# Patient Record
Sex: FEMALE | Race: WHITE | Employment: OTHER | URBAN - NONMETROPOLITAN AREA
[De-identification: names, ages, dates, MRNs, and addresses within clinical notes are randomized per-mention and may not be internally consistent; named-entity substitution may affect disease eponyms.]

---

## 2017-01-10 PROBLEM — S06.0XAA CEREBRAL CONCUSSION: Status: ACTIVE | Noted: 2017-01-10

## 2017-01-10 PROBLEM — E11.9 DIABETES MELLITUS (HCC): Chronic | Status: ACTIVE | Noted: 2017-01-10

## 2017-06-27 ENCOUNTER — HOSPITAL ENCOUNTER (OUTPATIENT)
Dept: BONE DENSITY | Age: 72
Discharge: HOME | End: 2017-06-27
Payer: MEDICARE

## 2017-06-27 DIAGNOSIS — M81.0 AGE-RELATED OSTEOPOROSIS WITHOUT CURRENT PATHOLOGICAL FRACTURE: ICD-10-CM

## 2017-06-27 PROCEDURE — 77080 DXA BONE DENSITY AXIAL: CPT

## 2018-03-19 ENCOUNTER — HOSPITAL ENCOUNTER (OUTPATIENT)
Dept: WOMENS IMAGING | Age: 73
Discharge: HOME | End: 2018-03-21
Payer: MEDICARE

## 2018-03-19 DIAGNOSIS — Z12.39 SCREENING BREAST EXAMINATION: ICD-10-CM

## 2018-03-19 PROCEDURE — 77067 SCR MAMMO BI INCL CAD: CPT

## 2019-06-18 ENCOUNTER — OFFICE VISIT (OUTPATIENT)
Dept: OBGYN | Age: 74
End: 2019-06-18
Payer: MEDICARE

## 2019-06-18 ENCOUNTER — HOSPITAL ENCOUNTER (OUTPATIENT)
Age: 74
Setting detail: SPECIMEN
Discharge: HOME | End: 2019-06-18
Payer: MEDICARE

## 2019-06-18 VITALS
SYSTOLIC BLOOD PRESSURE: 136 MMHG | BODY MASS INDEX: 25.16 KG/M2 | WEIGHT: 142 LBS | HEIGHT: 63 IN | DIASTOLIC BLOOD PRESSURE: 82 MMHG

## 2019-06-18 DIAGNOSIS — Z12.4 SCREENING FOR CERVICAL CANCER: ICD-10-CM

## 2019-06-18 DIAGNOSIS — Z12.31 SCREENING MAMMOGRAM, ENCOUNTER FOR: ICD-10-CM

## 2019-06-18 DIAGNOSIS — N81.4 UTERINE PROLAPSE: ICD-10-CM

## 2019-06-18 DIAGNOSIS — N90.89 VULVAR IRRITATION: ICD-10-CM

## 2019-06-18 DIAGNOSIS — N90.89 VULVAR IRRITATION: Primary | ICD-10-CM

## 2019-06-18 PROCEDURE — 56605 BIOPSY OF VULVA/PERINEUM: CPT | Performed by: ADVANCED PRACTICE MIDWIFE

## 2019-06-18 PROCEDURE — G0145 SCR C/V CYTO,THINLAYER,RESCR: HCPCS

## 2019-06-18 PROCEDURE — 88312 SPECIAL STAINS GROUP 1: CPT

## 2019-06-18 PROCEDURE — 87070 CULTURE OTHR SPECIMN AEROBIC: CPT

## 2019-06-18 PROCEDURE — 88305 TISSUE EXAM BY PATHOLOGIST: CPT

## 2019-06-18 PROCEDURE — G0123 SCREEN CERV/VAG THIN LAYER: HCPCS

## 2019-06-18 RX ORDER — TRIAMCINOLONE ACETONIDE 5 MG/G
OINTMENT TOPICAL
Qty: 1 TUBE | Refills: 3 | Status: SHIPPED | OUTPATIENT
Start: 2019-06-18 | End: 2019-06-25

## 2019-06-18 SDOH — HEALTH STABILITY: MENTAL HEALTH: HOW OFTEN DO YOU HAVE A DRINK CONTAINING ALCOHOL?: NEVER

## 2019-06-18 ASSESSMENT — PATIENT HEALTH QUESTIONNAIRE - PHQ9
SUM OF ALL RESPONSES TO PHQ QUESTIONS 1-9: 0
SUM OF ALL RESPONSES TO PHQ QUESTIONS 1-9: 0
SUM OF ALL RESPONSES TO PHQ9 QUESTIONS 1 & 2: 0
2. FEELING DOWN, DEPRESSED OR HOPELESS: 0
1. LITTLE INTEREST OR PLEASURE IN DOING THINGS: 0

## 2019-06-18 NOTE — PROGRESS NOTES
PROBLEM VISIT     Date of service: 2019    Yuliana Briceño  Is a 72 y.o.  female    PT's PCP is: Darcy Gilbert MD     : 1945                                             Subjective:       No LMP recorded (lmp unknown). Patient is postmenopausal.   OB History    Para Term  AB Living   8       2 6   SAB TAB Ectopic Molar Multiple Live Births   2                # Outcome Date GA Lbr Delfino/2nd Weight Sex Delivery Anes PTL Lv   8             7             6             5             4             3             2 SAB            1 SAB                 Social History     Tobacco Use   Smoking Status Former Smoker   Smokeless Tobacco Never Used        Social History     Substance and Sexual Activity   Alcohol Use Not Currently    Frequency: Never       Allergies: Darvon [propoxyphene]      Current Outpatient Medications:     Fluticasone Furoate-Vilanterol (BREO ELLIPTA IN), Inhale into the lungs, Disp: , Rfl:     triamcinolone (ARISTOCORT) 0.5 % ointment, Apply topically 2 times daily for 4 weeks and then prn, Disp: 1 Tube, Rfl: 3    LOSARTAN POTASSIUM-HCTZ PO, Take by mouth, Disp: , Rfl:     Metoprolol Succinate (TOPROL XL PO), Take by mouth, Disp: , Rfl:     SIMVASTATIN PO, Take by mouth, Disp: , Rfl:     METFORMIN HCL PO, Take by mouth, Disp: , Rfl:     ASPIRIN PO, Take 81 mg by mouth daily , Disp: , Rfl:     Social History     Substance and Sexual Activity   Sexual Activity Not Currently       Last Yearly:  ? Last pap: ? Last HPV: ?    Chief Complaint   Patient presents with    Vaginal Discharge     C/O vaginal , itch and swelling. Patient saw Dr. Rigo Roberts last week and was treated with 3 day vaginal cream however has had no improvement,         PE:  Vital Signs  Blood pressure 136/82, height 5' 3\" (1.6 m), weight 142 lb (64.4 kg), not currently breastfeeding.        NURSE: Jose HURT    HPI: here for vulvar irritation W CAD BILATERAL         will treat likely  Lichen sclerosus; also patient would like to consider surgical options for uterine prolapse after this issue gets under control, doesn't like idea of pessary    I have discontinued Lul Tse's albuterol sulfate HFA. I am also having her start on triamcinolone. Additionally, I am having her maintain her LOSARTAN POTASSIUM-HCTZ PO, Metoprolol Succinate (TOPROL XL PO), SIMVASTATIN PO, METFORMIN HCL PO, ASPIRIN PO, and Fluticasone Furoate-Vilanterol (BREO ELLIPTA IN). Return for will call with results. There are no Patient Instructions on file for this visit. Over 50% of time spent on counseling and care coordination on: see assessment and plan,  She was also counseled on her preventative health maintenance recommendations and follow-up.         FF time: 15 min      Stephany Elena,6/18/2019 7:53 PM

## 2019-06-21 LAB — SURGICAL PATHOLOGY REPORT: NORMAL

## 2019-06-22 LAB
CULTURE: NORMAL
Lab: NORMAL
SPECIMEN DESCRIPTION: NORMAL

## 2019-06-26 LAB — CYTOLOGY REPORT: NORMAL

## 2019-07-22 ENCOUNTER — HOSPITAL ENCOUNTER (OUTPATIENT)
Dept: WOMENS IMAGING | Age: 74
Discharge: HOME | End: 2019-07-24
Payer: MEDICARE

## 2019-07-22 DIAGNOSIS — Z12.31 SCREENING MAMMOGRAM, ENCOUNTER FOR: ICD-10-CM

## 2019-07-22 PROCEDURE — 77063 BREAST TOMOSYNTHESIS BI: CPT

## 2019-08-01 ENCOUNTER — OFFICE VISIT (OUTPATIENT)
Dept: OBGYN | Age: 74
End: 2019-08-01
Payer: MEDICARE

## 2019-08-01 VITALS
HEIGHT: 63 IN | BODY MASS INDEX: 24.95 KG/M2 | WEIGHT: 140.8 LBS | DIASTOLIC BLOOD PRESSURE: 70 MMHG | SYSTOLIC BLOOD PRESSURE: 122 MMHG

## 2019-08-01 DIAGNOSIS — N81.4 UTERINE PROLAPSE: ICD-10-CM

## 2019-08-01 DIAGNOSIS — L43.9 LICHEN PLANUS: Primary | ICD-10-CM

## 2019-08-01 PROCEDURE — 99212 OFFICE O/P EST SF 10 MIN: CPT | Performed by: ADVANCED PRACTICE MIDWIFE

## 2019-08-01 PROCEDURE — 1036F TOBACCO NON-USER: CPT | Performed by: ADVANCED PRACTICE MIDWIFE

## 2019-08-01 PROCEDURE — G8420 CALC BMI NORM PARAMETERS: HCPCS | Performed by: ADVANCED PRACTICE MIDWIFE

## 2019-08-01 PROCEDURE — 3017F COLORECTAL CA SCREEN DOC REV: CPT | Performed by: ADVANCED PRACTICE MIDWIFE

## 2019-08-01 PROCEDURE — 1123F ACP DISCUSS/DSCN MKR DOCD: CPT | Performed by: ADVANCED PRACTICE MIDWIFE

## 2019-08-01 PROCEDURE — 4040F PNEUMOC VAC/ADMIN/RCVD: CPT | Performed by: ADVANCED PRACTICE MIDWIFE

## 2019-08-01 PROCEDURE — G8427 DOCREV CUR MEDS BY ELIG CLIN: HCPCS | Performed by: ADVANCED PRACTICE MIDWIFE

## 2019-08-01 PROCEDURE — 1090F PRES/ABSN URINE INCON ASSESS: CPT | Performed by: ADVANCED PRACTICE MIDWIFE

## 2019-08-01 PROCEDURE — G8399 PT W/DXA RESULTS DOCUMENT: HCPCS | Performed by: ADVANCED PRACTICE MIDWIFE

## 2019-08-01 NOTE — PROGRESS NOTES
Genitalia:  General appearance; normal, Hair distribution; normal, Lesions absent, no current open or red areas  Urethral Meatus:  Size normal, Location normal, Lesions absent, Prolapse absent  Urethra: Fullness absent, Masses absent  Bladder:  Fullness absent, Masses absent, Tenderness absent, Cystocele absent  Vagina:  General appearance normal, Estrogen effect normal, Discharge absent, Lesions absent, uterine prolapse with valsalva  Cervix:  General appearance normal, Lesions absent, Discharge absent, Tenderness absent, Enlargement absent, Nodularity absent  Uterus:  Size normal, Tenderness absent  Adenexa: Masses absent, Tenderness absent  Anus/Perineum:  Lesions absent and Masses absent                                                        Results reviewed today:    No results found for this visit on 08/01/19. Assessment and Plan          Diagnosis Orders   1. Lichen planus     2. Uterine prolapse         continue prn steroid topical; declines anything currently for uterine prolapse      I am having Anjana  maintain her LOSARTAN POTASSIUM-HCTZ PO, Metoprolol Succinate (TOPROL XL PO), SIMVASTATIN PO, METFORMIN HCL PO, ASPIRIN PO, and Fluticasone Furoate-Vilanterol (BREO ELLIPTA IN). Return in about 1 year (around 8/1/2020) for yearly or prn. There are no Patient Instructions on file for this visit. Over 50% of time spent on counseling and care coordination on: see assessment and plan,  She was also counseled on her preventative health maintenance recommendations and follow-up.         FF time: 15 min      Vipul Elena,8/1/2019 2:12 PM

## 2020-01-27 ENCOUNTER — HOSPITAL ENCOUNTER (OUTPATIENT)
Dept: WOMENS IMAGING | Age: 75
Discharge: HOME | End: 2020-01-29
Payer: MEDICARE

## 2020-01-27 PROCEDURE — 77080 DXA BONE DENSITY AXIAL: CPT

## 2021-05-18 ENCOUNTER — HOSPITAL ENCOUNTER (OUTPATIENT)
Age: 76
Discharge: HOME | End: 2021-05-18
Payer: MEDICARE

## 2021-05-18 LAB
ANION GAP SERPL CALCULATED.3IONS-SCNC: 9 MMOL/L (ref 9–17)
BUN BLDV-MCNC: 18 MG/DL (ref 8–23)
BUN/CREAT BLD: 19 (ref 9–20)
CALCIUM SERPL-MCNC: 10.6 MG/DL (ref 8.6–10.4)
CHLORIDE BLD-SCNC: 104 MMOL/L (ref 98–107)
CHOLESTEROL/HDL RATIO: 3.6
CHOLESTEROL: 160 MG/DL
CO2: 27 MMOL/L (ref 20–31)
CREAT SERPL-MCNC: 0.93 MG/DL (ref 0.5–0.9)
ESTIMATED AVERAGE GLUCOSE: 128 MG/DL
GFR AFRICAN AMERICAN: >60 ML/MIN
GFR NON-AFRICAN AMERICAN: 59 ML/MIN
GFR SERPL CREATININE-BSD FRML MDRD: ABNORMAL ML/MIN/{1.73_M2}
GFR SERPL CREATININE-BSD FRML MDRD: ABNORMAL ML/MIN/{1.73_M2}
GLUCOSE BLD-MCNC: 111 MG/DL (ref 70–99)
HBA1C MFR BLD: 6.1 % (ref 4–6)
HDLC SERPL-MCNC: 45 MG/DL
LDL CHOLESTEROL: 94 MG/DL (ref 0–130)
POTASSIUM SERPL-SCNC: 4.1 MMOL/L (ref 3.7–5.3)
SODIUM BLD-SCNC: 140 MMOL/L (ref 135–144)
TRIGL SERPL-MCNC: 106 MG/DL
VLDLC SERPL CALC-MCNC: NORMAL MG/DL (ref 1–30)

## 2021-05-18 PROCEDURE — 83036 HEMOGLOBIN GLYCOSYLATED A1C: CPT

## 2021-05-18 PROCEDURE — 87522 HEPATITIS C REVRS TRNSCRPJ: CPT

## 2021-05-18 PROCEDURE — 36415 COLL VENOUS BLD VENIPUNCTURE: CPT

## 2021-05-18 PROCEDURE — 80061 LIPID PANEL: CPT

## 2021-05-18 PROCEDURE — 80048 BASIC METABOLIC PNL TOTAL CA: CPT

## 2021-05-20 LAB
DIRECT EXAM: NORMAL
Lab: NORMAL
SPECIMEN DESCRIPTION: NORMAL

## 2021-05-28 ENCOUNTER — HOSPITAL ENCOUNTER (OUTPATIENT)
Age: 76
Discharge: HOME | End: 2021-05-28
Payer: MEDICARE

## 2021-05-28 ENCOUNTER — HOSPITAL ENCOUNTER (OUTPATIENT)
Age: 76
Discharge: HOME | End: 2021-05-30
Payer: MEDICARE

## 2021-05-28 ENCOUNTER — HOSPITAL ENCOUNTER (OUTPATIENT)
Dept: GENERAL RADIOLOGY | Age: 76
Discharge: HOME | End: 2021-05-30
Payer: MEDICARE

## 2021-05-28 DIAGNOSIS — R53.83 FATIGUE, UNSPECIFIED TYPE: ICD-10-CM

## 2021-05-28 LAB
-: ABNORMAL
AMORPHOUS: ABNORMAL
ANION GAP SERPL CALCULATED.3IONS-SCNC: 11 MMOL/L (ref 9–17)
BACTERIA: ABNORMAL
BILIRUBIN URINE: NEGATIVE
BUN BLDV-MCNC: 19 MG/DL (ref 8–23)
CASTS UA: ABNORMAL /LPF
CHLORIDE BLD-SCNC: 102 MMOL/L (ref 98–107)
CO2: 27 MMOL/L (ref 20–31)
COLOR: YELLOW
COMMENT UA: ABNORMAL
CREAT SERPL-MCNC: 0.93 MG/DL (ref 0.5–0.9)
CRYSTALS, UA: ABNORMAL /HPF
EKG ATRIAL RATE: 64 BPM
EKG P AXIS: 48 DEGREES
EKG P-R INTERVAL: 118 MS
EKG Q-T INTERVAL: 396 MS
EKG QRS DURATION: 92 MS
EKG QTC CALCULATION (BAZETT): 408 MS
EKG R AXIS: -34 DEGREES
EKG T AXIS: 44 DEGREES
EKG VENTRICULAR RATE: 64 BPM
EPITHELIAL CELLS UA: ABNORMAL /HPF (ref 0–25)
GFR AFRICAN AMERICAN: >60 ML/MIN
GFR NON-AFRICAN AMERICAN: 59 ML/MIN
GFR SERPL CREATININE-BSD FRML MDRD: ABNORMAL ML/MIN/{1.73_M2}
GFR SERPL CREATININE-BSD FRML MDRD: ABNORMAL ML/MIN/{1.73_M2}
GLUCOSE URINE: NEGATIVE
KETONES, URINE: NEGATIVE
LEUKOCYTE ESTERASE, URINE: ABNORMAL
MAGNESIUM: 1.9 MG/DL (ref 1.6–2.6)
MUCUS: ABNORMAL
NITRITE, URINE: NEGATIVE
OTHER OBSERVATIONS UA: ABNORMAL
PH UA: 5.5 (ref 5–9)
POTASSIUM SERPL-SCNC: 4.2 MMOL/L (ref 3.7–5.3)
PROTEIN UA: ABNORMAL
RBC UA: ABNORMAL /HPF (ref 0–2)
RENAL EPITHELIAL, UA: ABNORMAL /HPF
SODIUM BLD-SCNC: 140 MMOL/L (ref 135–144)
SPECIFIC GRAVITY UA: 1.02 (ref 1.01–1.02)
THYROXINE, FREE: 0.96 NG/DL (ref 0.93–1.7)
TOTAL CK: 59 U/L (ref 26–192)
TRICHOMONAS: ABNORMAL
TSH SERPL DL<=0.05 MIU/L-ACNC: 0.32 MIU/L (ref 0.3–5)
TURBIDITY: ABNORMAL
URINE HGB: ABNORMAL
UROBILINOGEN, URINE: NORMAL
WBC UA: ABNORMAL /HPF (ref 0–5)
YEAST: ABNORMAL

## 2021-05-28 PROCEDURE — 71046 X-RAY EXAM CHEST 2 VIEWS: CPT

## 2021-05-28 PROCEDURE — 80051 ELECTROLYTE PANEL: CPT

## 2021-05-28 PROCEDURE — 87086 URINE CULTURE/COLONY COUNT: CPT

## 2021-05-28 PROCEDURE — 36415 COLL VENOUS BLD VENIPUNCTURE: CPT

## 2021-05-28 PROCEDURE — 84443 ASSAY THYROID STIM HORMONE: CPT

## 2021-05-28 PROCEDURE — 84439 ASSAY OF FREE THYROXINE: CPT

## 2021-05-28 PROCEDURE — 82565 ASSAY OF CREATININE: CPT

## 2021-05-28 PROCEDURE — 93005 ELECTROCARDIOGRAM TRACING: CPT

## 2021-05-28 PROCEDURE — 81001 URINALYSIS AUTO W/SCOPE: CPT

## 2021-05-28 PROCEDURE — 82550 ASSAY OF CK (CPK): CPT

## 2021-05-28 PROCEDURE — 84520 ASSAY OF UREA NITROGEN: CPT

## 2021-05-28 PROCEDURE — 83735 ASSAY OF MAGNESIUM: CPT

## 2021-05-29 LAB
CULTURE: NO GROWTH
Lab: NORMAL
SPECIMEN DESCRIPTION: NORMAL

## 2021-06-09 ENCOUNTER — HOSPITAL ENCOUNTER (OUTPATIENT)
Dept: NON INVASIVE DIAGNOSTICS | Age: 76
Discharge: HOME | End: 2021-06-09
Payer: MEDICARE

## 2021-06-09 DIAGNOSIS — R53.83 FATIGUE, UNSPECIFIED TYPE: ICD-10-CM

## 2021-06-09 DIAGNOSIS — R00.2 PALPITATIONS: ICD-10-CM

## 2021-06-09 DIAGNOSIS — I51.89 DIASTOLIC DYSFUNCTION: ICD-10-CM

## 2021-06-09 LAB
LV EF: 65 %
LVEF MODALITY: NORMAL

## 2021-06-09 PROCEDURE — 93306 TTE W/DOPPLER COMPLETE: CPT

## 2021-06-09 PROCEDURE — 93225 XTRNL ECG REC<48 HRS REC: CPT

## 2021-06-09 PROCEDURE — 93226 XTRNL ECG REC<48 HR SCAN A/R: CPT

## 2021-06-09 NOTE — PROGRESS NOTES
Explained policies and procedures of an echocardiogram/doppler study. Explained Holter monitor and diary.

## 2021-06-14 LAB
ACQUISITION DURATION: NORMAL S
AVERAGE HEART RATE: 63 BPM
EKG DIAGNOSIS: NORMAL
HOLTER MAX HEART RATE: 87 BPM
HOOKUP DATE: NORMAL
HOOKUP TIME: NORMAL
MAX HEART RATE TIME/DATE: NORMAL
MIN HEART RATE TIME/DATE: NORMAL
MIN HEART RATE: 48 BPM
NUMBER OF QRS COMPLEXES: NORMAL
NUMBER OF SUPRAVENTRICULAR COUPLETS: 5
NUMBER OF SUPRAVENTRICULAR ECTOPICS: 3616
NUMBER OF SUPRAVENTRICULAR ISOLATED BEATS: 2851
NUMBER OF VENTRICULAR BIGEMINAL CYCLES: 0
NUMBER OF VENTRICULAR COUPLETS: 0
NUMBER OF VENTRICULAR ECTOPICS: 38

## 2021-10-08 ENCOUNTER — OFFICE VISIT (OUTPATIENT)
Dept: CARDIOLOGY | Age: 76
End: 2021-10-08
Payer: MEDICARE

## 2021-10-08 VITALS
HEART RATE: 70 BPM | RESPIRATION RATE: 17 BRPM | BODY MASS INDEX: 23.49 KG/M2 | DIASTOLIC BLOOD PRESSURE: 67 MMHG | SYSTOLIC BLOOD PRESSURE: 133 MMHG | WEIGHT: 137.6 LBS | HEIGHT: 64 IN | OXYGEN SATURATION: 98 %

## 2021-10-08 DIAGNOSIS — R06.02 SOB (SHORTNESS OF BREATH): ICD-10-CM

## 2021-10-08 DIAGNOSIS — J44.9 CHRONIC OBSTRUCTIVE PULMONARY DISEASE, UNSPECIFIED COPD TYPE (HCC): ICD-10-CM

## 2021-10-08 DIAGNOSIS — R94.31 ABNORMAL ECG: ICD-10-CM

## 2021-10-08 DIAGNOSIS — I34.0 MODERATE MITRAL REGURGITATION: Primary | ICD-10-CM

## 2021-10-08 DIAGNOSIS — R00.2 PALPITATIONS: ICD-10-CM

## 2021-10-08 PROCEDURE — G8484 FLU IMMUNIZE NO ADMIN: HCPCS | Performed by: INTERNAL MEDICINE

## 2021-10-08 PROCEDURE — G8926 SPIRO NO PERF OR DOC: HCPCS | Performed by: INTERNAL MEDICINE

## 2021-10-08 PROCEDURE — 93005 ELECTROCARDIOGRAM TRACING: CPT | Performed by: INTERNAL MEDICINE

## 2021-10-08 PROCEDURE — 99211 OFF/OP EST MAY X REQ PHY/QHP: CPT | Performed by: INTERNAL MEDICINE

## 2021-10-08 PROCEDURE — 93000 ELECTROCARDIOGRAM COMPLETE: CPT | Performed by: INTERNAL MEDICINE

## 2021-10-08 PROCEDURE — G8420 CALC BMI NORM PARAMETERS: HCPCS | Performed by: INTERNAL MEDICINE

## 2021-10-08 PROCEDURE — 3023F SPIROM DOC REV: CPT | Performed by: INTERNAL MEDICINE

## 2021-10-08 PROCEDURE — G8427 DOCREV CUR MEDS BY ELIG CLIN: HCPCS | Performed by: INTERNAL MEDICINE

## 2021-10-08 PROCEDURE — 1090F PRES/ABSN URINE INCON ASSESS: CPT | Performed by: INTERNAL MEDICINE

## 2021-10-08 PROCEDURE — 99204 OFFICE O/P NEW MOD 45 MIN: CPT | Performed by: INTERNAL MEDICINE

## 2021-10-08 RX ORDER — ATORVASTATIN CALCIUM 40 MG/1
40 TABLET, FILM COATED ORAL DAILY
COMMUNITY

## 2021-10-08 RX ORDER — LOSARTAN POTASSIUM 25 MG/1
25 TABLET ORAL DAILY
COMMUNITY

## 2021-10-08 RX ORDER — SERTRALINE HYDROCHLORIDE 100 MG/1
100 TABLET, FILM COATED ORAL DAILY
COMMUNITY

## 2021-10-08 NOTE — PROGRESS NOTES
Lynnette Kaplan MA am scribing for and in the presence of Laquita Sanchez MD.    Patient: Nilda Tidwell  : 1945  Date of Visit: 2021    REASON FOR VISIT / CONSULTATION: Establish Cardiologist (Referral from [address] w/ palpitations. Echo & Holter on 21. EKG on 21. Was very tired & palpitations. Had a kidney stone at that time. Still has the palpitations, it is sporatic. Some SOB at times due to COPD. Lightheaded/dizziness that happens randomly. Denies: CP. )      History of Present Illness:        Dear LUDWIG Michael CNP    I had the pleasure of seeing Nilda Tidwell today. Ms. Frances Greenwood is a 78 y.o. female who presented for evaluation and to establish care. She has long history of type 2 diabetes on Metformin, dyslipidemia on Lipitor and hypertension. She also has intermittent palpitations. She had a kidney stone back in 2021 and an echo done at that time showed moderate mitral regurgitation. She also said she was diagnosed with COPD by Dr. Jeovanny Damon. She is a former smoker who smoked 1/2 ppd for 30 years. She quit in . With regard to her family history. She said there is no heart disease in the family except for her son who is 55-year-old when he got heart attack post Covid. Other than this, no immediate family members with premature CAD. Risk Factors:  Age  Hypertension  Hyperlipidemia  Diabetes  Former Smoker    EKG done today in office on 10/8/2021: Sinus rhythm with left axis deviation and moderate voltage criteria for LVH. Ms. Frances Greenwood is here today to establish care after being referred by LUDWIG Michael CNP. She is complaining of chronic shortness of breath on exertion which she is always attributing it to her COPD. She also continues to complain of intermittent palpitations. No significant dizziness or lightheadedness. No presyncope or syncopal episodes. No chest pain, pressure or tightness. No fever or cough.   No orthopnea or PND but reported having trouble sleeping at night. She said it hit or miss. She considers herself fairly active, babysitting her grandkids and taking care of her household chores and grocery shopping without significant exertional symptoms. She denied any current or recent chest pain, pressure or tightness. She denies having any abdominal pain, nausea or vomiting. No cough, fever or chills. No bleeding problems, problems with her medications or any other concerns at this time. Exercise Tolerance: Ms. Ness Gary reports that she has a fairly good exercise tolerance. Her says that she could walk 1 mile without developing chest discomfort or significant shortness of breath. She has 4 great grand kids under the age of 3 that she helps care for. She doesn't regularly exercise. PAST MEDICAL HISTORY:        Past Medical History:   Diagnosis Date    [facility]ch[facility] obstructive [facility] disease) [facility]     [facility]     Hyperlipidemia     Hypertension        CURRENT ALLERGIES: Darvon [propoxyphene] REVIEW OF SYSTEMS: 14 systems were reviewed. Pertinent positives and negatives as above, all else negative.      Past Surgical History:   Procedure Laterality Date    COLONOSCOPY  3/21/16    -hemorrhoids    DENTAL SURGERY      JOINT REPLACEMENT      right total knee    Social History:  Social History     Tobacco Use    Smoking status: Former Smoker     Packs/day: 0.50     Years: 30.00     Pack years: 15.00     Quit date: 2015     Years since quittin.7    Smokeless tobacco: Never Used   Substance Use Topics    Alcohol use: Not Currently    Drug use: Never        CURRENT MEDICATIONS:        Outpatient Medications Marked as Taking for the 10/8/21 encounter (Office Visit) with Colin Childress MD   Medication Sig Dispense Refill    atorvastatin (LIPITOR) 40 MG tablet Take 40 mg by mouth daily      losartan (COZAAR) 25 MG tablet Take 25 mg by mouth daily      sertraline (ZOLOFT) 100 MG tablet Take 100 mg by mouth daily      Fluticasone Furoate-Vilanterol (BREO ELLIPTA IN) Inhale into the lungs as needed       metoprolol succinate (TOPROL XL) 25 MG extended release tablet Take 25 mg by mouth daily       metFORMIN (GLUCOPHAGE) 500 MG tablet Take 500 mg by mouth daily       ASPIRIN PO Take 81 mg by mouth daily          FAMILY HISTORY: family history includes Cervical Cancer in her daughter; Other in an other family member. Physical Examination:     /67 (Site: Left Upper Arm, Position: Sitting, Cuff Size: Medium Adult)   Pulse 70   Resp 17   Ht 5' 3.5\" (1.613 m)   Wt 137 lb 9.6 oz (62.4 kg)   LMP  (LMP Unknown)   SpO2 98%   BMI 23.99 kg/m²  Body mass index is 23.99 kg/m². Constitutional: She is oriented to person, place, and time. She appears well-developed and well-nourished. In no acute distress. HEENT: Normocephalic and atraumatic. No JVD present. Carotid bruit is not present. No mass and no thyromegaly present. No lymphadenopathy present. Cardiovascular: Normal rate, regular rhythm, normal heart sounds. Exam reveals no gallop and no friction rubs. 2/6 systolic murmur, 5th intercostal space on the LEFT in the mid-clavicular line (cardiac apex). Pulmonary/Chest: Poor air entry bilaterally. No significant wheezes or crackles. Abdominal: Soft, non-tender. Bowel sounds and aorta are normal. She exhibits no organomegaly, mass or bruit. Extremities: No significant edema. No cyanosis or clubbing. 2+ radial and carotid pulses. Distal extremity pulses: 2+ bilaterally. Neurological: She is alert and oriented to person, place, and time. No evidence of gross cranial nerve deficit. Coordination appeared normal.   Skin: Skin is warm and dry. There is no rash or diaphoresis. Psychiatric: She has a normal mood and affect.  Her speech is normal and behavior is normal.        MOST RECENT LABS ON RECORD:   Lab Results   Component Value Date    WBC 8.7 01/10/2017    HGB 11.3 (L) 01/10/2017    HCT 33.4 (L) 01/10/2017     01/10/2017    CHOL 160 05/18/2021    TRIG 106 05/18/2021    HDL 45 05/18/2021    ALT 28 09/23/2014    AST 22 09/23/2014     05/28/2021    K 4.2 05/28/2021     05/28/2021    CREATININE 0.93 (H) 05/28/2021    BUN 19 05/28/2021    CO2 27 05/28/2021    TSH 0.32 05/28/2021    LABA1C 6.1 (H) 05/18/2021       ASSESSMENT:     1. Moderate mitral regurgitation    2. Palpitations    3. SOB (shortness of breath)    4. Chronic obstructive pulmonary disease, unspecified COPD type ([facility]       PLAN:        · Moderate Mitral Regurgitation:  · Complaining of chronic shortness of breath but she attributes this to her COPD. · No clinical signs of volume overload. · I reviewed her echo, I told her we just need to get a repeat echo after 1 year to reevaluate her mitral regurgitation. · We discussed the natural history of mitral valve regurgitation in the new modalities of transvascular mitral intervention if needed. · Continue losartan and Toprol-XL.  Paroxysmal Recurrent intermittent palpitations: Rate Control Symptomatic  · Beta Blocker: Continue Metoprolol succinate (Toprol XL) 25 mg daily. · I reviewed her prior Holter monitor from June 2021: Sinus rhythm with average heart rate of 63 bpm ranging between 48 and 87 bpm.  Rare isolated PVCs. Occasional PACs with occasional couplets. PVCs burden 2%. One atrial run of 6 beats at heart rate 154 bpm.  · I will order a Cam monitor for longer monitoring time. The main reason for this is to assess the frequency of her atrial tachycardia and to assess her chronotropic incompetence. · Shortness of Breath:   · Abnormal ECG   Although her shortness of breath can be easily explained by her COPD, patient has multiple risk factors for CAD including age, hypertension, dyslipidemia and longstanding diabetes.  Her ECG is abnormal as above.   I think getting Lexiscan stress test is needed to rule out ischemic etiology of her shortness of breath that can be an anginal equivalent.  Continue aspirin, Toprol-XL and Lipitor for now. · Essential Hypertension: Controlled   Beta Blocker: Continue Metoprolol succinate (Toprol XL) 25 mg daily.  ACE Inibitor/ARB: Continue losartan (Cozaar) 25 mg daily. · Hyperlipidemia: Mixed - Last LDL on 5/18/2021 was 94 mg/dL   · Cholesterol Reduction Therapy: Continue Atorvastatin (Lipitor) 40 mg daily. I discussed the potential benefits of statin therapy as well as the potential risks including myalgia as well as the rare but potentially serious complication of liver or kidney damage. Although rare, I told them that this could be serious and therefore told them to stop the medication immediately and call if they developed any severe muscle aches or pains and they agreed to do so. · Will consider increasing her Lipitor to 80 mg or switching her to Crestor particularly if her stress test came back abnormal.  Patient is reluctant to change her statin therapy at this point because she does not think she will tolerate high intensity statin therapy. I did explain to her that diabetes is a coronary artery disease equivalent and we should get her LDL below 70 mg/dL. She states she will try lifestyle changes before increasing her medications. · Diabetes: Type 2   · Continue Metformin  · Hemoglobin A1c 6.1%. In the meantime, I encouraged Ms. Carlitso Juárez to continue to take her other medications. FOLLOW UP:   I told Ms. Carlitos Juárez to call my office if she had any problems, but otherwise I asked her to Return in about 1 year (around 10/8/2022). However, I would be happy to see her sooner should the need arise. Sincerely,  Ila Wilson MD, F.A.C.C.   [facility] Cardiology Specialist    [address] [facility]  Phone: [phone], Fax: [phone]     I believe that the risk of significant morbidity and mortality related to the patient's current medical conditions are: intermediate-high. >60 minutes were spent during prep work, discussion and exam of the patient, and follow up documentation and all of their questions were answered. The documentation recorded by the scribe, accurately and completely reflects the services I personally performed and the decisions made by me. Trang Schneider MD, F.A.C.C.  October 8, 2021

## 2022-01-04 ENCOUNTER — HOSPITAL ENCOUNTER (OUTPATIENT)
Dept: INFUSION THERAPY | Age: 77
Discharge: HOME | End: 2022-01-04
Payer: MEDICARE

## 2022-01-04 VITALS
OXYGEN SATURATION: 98 % | RESPIRATION RATE: 18 BRPM | DIASTOLIC BLOOD PRESSURE: 63 MMHG | HEART RATE: 57 BPM | SYSTOLIC BLOOD PRESSURE: 120 MMHG | TEMPERATURE: 97.9 F

## 2022-01-04 DIAGNOSIS — U07.1 COVID-19: Primary | ICD-10-CM

## 2022-01-04 DIAGNOSIS — U07.1 COVID-19: ICD-10-CM

## 2022-01-04 PROCEDURE — M0245 HC IV INFUSION BAMLANIVIMAB & ETESEVIMAB W/MONITORING: HCPCS

## 2022-01-04 PROCEDURE — 2580000003 HC RX 258: Performed by: NURSE PRACTITIONER

## 2022-01-04 PROCEDURE — 6360000002 HC RX W HCPCS: Performed by: NURSE PRACTITIONER

## 2022-01-04 PROCEDURE — 2500000003 HC RX 250 WO HCPCS: Performed by: NURSE PRACTITIONER

## 2022-01-04 RX ORDER — SODIUM CHLORIDE 9 MG/ML
INJECTION, SOLUTION INTRAVENOUS CONTINUOUS
Status: CANCELLED | OUTPATIENT
Start: 2022-01-04

## 2022-01-04 RX ORDER — ACETAMINOPHEN 325 MG/1
650 TABLET ORAL
OUTPATIENT
Start: 2022-01-04

## 2022-01-04 RX ORDER — DEXAMETHASONE 6 MG/1
6 TABLET ORAL
COMMUNITY
Start: 2022-01-03

## 2022-01-04 RX ORDER — SODIUM CHLORIDE 0.9 % (FLUSH) 0.9 %
5-40 SYRINGE (ML) INJECTION PRN
Status: CANCELLED | OUTPATIENT
Start: 2022-01-04

## 2022-01-04 RX ORDER — DOXYCYCLINE HYCLATE 100 MG/1
100 CAPSULE ORAL 2 TIMES DAILY
COMMUNITY
Start: 2022-01-03 | End: 2022-01-10

## 2022-01-04 RX ORDER — EPINEPHRINE 1 MG/ML
0.3 INJECTION, SOLUTION, CONCENTRATE INTRAVENOUS PRN
OUTPATIENT
Start: 2022-01-04

## 2022-01-04 RX ORDER — SODIUM CHLORIDE 9 MG/ML
25 INJECTION, SOLUTION INTRAVENOUS PRN
OUTPATIENT
Start: 2022-01-04

## 2022-01-04 RX ORDER — GUAIFENESIN 600 MG/1
1200 TABLET, EXTENDED RELEASE ORAL EVERY 12 HOURS SCHEDULED
COMMUNITY
Start: 2022-01-03 | End: 2022-02-02

## 2022-01-04 RX ORDER — DIPHENHYDRAMINE HYDROCHLORIDE 50 MG/ML
50 INJECTION INTRAMUSCULAR; INTRAVENOUS
OUTPATIENT
Start: 2022-01-04

## 2022-01-04 RX ORDER — ONDANSETRON 2 MG/ML
8 INJECTION INTRAMUSCULAR; INTRAVENOUS
OUTPATIENT
Start: 2022-01-04

## 2022-01-04 RX ORDER — SODIUM CHLORIDE 0.9 % (FLUSH) 0.9 %
5-40 SYRINGE (ML) INJECTION PRN
Status: DISCONTINUED | OUTPATIENT
Start: 2022-01-04 | End: 2022-01-05 | Stop reason: HOSPADM

## 2022-01-04 RX ORDER — SODIUM CHLORIDE 9 MG/ML
INJECTION, SOLUTION INTRAVENOUS CONTINUOUS
OUTPATIENT
Start: 2022-01-04

## 2022-01-04 RX ORDER — SODIUM CHLORIDE 9 MG/ML
INJECTION, SOLUTION INTRAVENOUS CONTINUOUS
Status: DISCONTINUED | OUTPATIENT
Start: 2022-01-04 | End: 2022-01-05 | Stop reason: HOSPADM

## 2022-01-04 RX ORDER — ALBUTEROL SULFATE 90 UG/1
4 AEROSOL, METERED RESPIRATORY (INHALATION) PRN
OUTPATIENT
Start: 2022-01-04

## 2022-01-04 RX ADMIN — SODIUM CHLORIDE, PRESERVATIVE FREE 10 ML: 5 INJECTION INTRAVENOUS at 14:35

## 2022-01-04 RX ADMIN — SODIUM CHLORIDE: 9 INJECTION, SOLUTION INTRAVENOUS at 14:41

## 2022-01-04 NOTE — PROGRESS NOTES
Patient presents for scheduled monoclonal antibody infusion. Confirmed meets criteria. VSS. Educated.  Infusion in progress and tolerating well.agrees to get the monoclonial antibody

## 2022-03-24 ENCOUNTER — OFFICE VISIT (OUTPATIENT)
Dept: OBGYN | Age: 77
End: 2022-03-24
Payer: MEDICARE

## 2022-03-24 VITALS
WEIGHT: 144 LBS | HEIGHT: 63 IN | DIASTOLIC BLOOD PRESSURE: 72 MMHG | SYSTOLIC BLOOD PRESSURE: 138 MMHG | BODY MASS INDEX: 25.52 KG/M2

## 2022-03-24 DIAGNOSIS — L43.9 LICHEN PLANUS: Primary | ICD-10-CM

## 2022-03-24 DIAGNOSIS — N81.4 CYSTOCELE WITH PROLAPSE: ICD-10-CM

## 2022-03-24 DIAGNOSIS — B37.2 CUTANEOUS CANDIDIASIS: ICD-10-CM

## 2022-03-24 DIAGNOSIS — K64.4 HEMORRHOIDAL SKIN TAGS: ICD-10-CM

## 2022-03-24 DIAGNOSIS — N81.4 UTERINE PROLAPSE: ICD-10-CM

## 2022-03-24 DIAGNOSIS — K62.9 PERIANAL LESION: ICD-10-CM

## 2022-03-24 DIAGNOSIS — Z12.31 SCREENING MAMMOGRAM, ENCOUNTER FOR: ICD-10-CM

## 2022-03-24 PROCEDURE — 99211 OFF/OP EST MAY X REQ PHY/QHP: CPT | Performed by: ADVANCED PRACTICE MIDWIFE

## 2022-03-24 PROCEDURE — G8427 DOCREV CUR MEDS BY ELIG CLIN: HCPCS | Performed by: ADVANCED PRACTICE MIDWIFE

## 2022-03-24 PROCEDURE — G8417 CALC BMI ABV UP PARAM F/U: HCPCS | Performed by: ADVANCED PRACTICE MIDWIFE

## 2022-03-24 PROCEDURE — G8399 PT W/DXA RESULTS DOCUMENT: HCPCS | Performed by: ADVANCED PRACTICE MIDWIFE

## 2022-03-24 PROCEDURE — 3017F COLORECTAL CA SCREEN DOC REV: CPT | Performed by: ADVANCED PRACTICE MIDWIFE

## 2022-03-24 PROCEDURE — 4040F PNEUMOC VAC/ADMIN/RCVD: CPT | Performed by: ADVANCED PRACTICE MIDWIFE

## 2022-03-24 PROCEDURE — 99213 OFFICE O/P EST LOW 20 MIN: CPT | Performed by: ADVANCED PRACTICE MIDWIFE

## 2022-03-24 PROCEDURE — G8484 FLU IMMUNIZE NO ADMIN: HCPCS | Performed by: ADVANCED PRACTICE MIDWIFE

## 2022-03-24 PROCEDURE — 1123F ACP DISCUSS/DSCN MKR DOCD: CPT | Performed by: ADVANCED PRACTICE MIDWIFE

## 2022-03-24 PROCEDURE — 1090F PRES/ABSN URINE INCON ASSESS: CPT | Performed by: ADVANCED PRACTICE MIDWIFE

## 2022-03-24 PROCEDURE — 1036F TOBACCO NON-USER: CPT | Performed by: ADVANCED PRACTICE MIDWIFE

## 2022-03-24 RX ORDER — CLOTRIMAZOLE AND BETAMETHASONE DIPROPIONATE 10; .64 MG/G; MG/G
CREAM TOPICAL
Qty: 45 G | Refills: 1 | Status: SHIPPED | OUTPATIENT
Start: 2022-03-24

## 2022-03-24 RX ORDER — CLOBETASOL PROPIONATE 0.5 MG/G
OINTMENT TOPICAL
Qty: 45 G | Refills: 1 | Status: SHIPPED | OUTPATIENT
Start: 2022-03-24

## 2022-03-24 NOTE — PROGRESS NOTES
PROBLEM VISIT     Date of service: 3/24/2022    Patricia Garay  Is a 78 y. o.  female    PT's PCP is: LUDWIG Iqbal CNP     : 1945                                             Subjective:       No LMP recorded (lmp unknown). Patient is postmenopausal.   OB History    Para Term  AB Living   8       2 6   SAB IAB Ectopic Molar Multiple Live Births   2                # Outcome Date GA Lbr Delfino/2nd Weight Sex Delivery Anes PTL Lv   8             7             6             5             4             3             2 SAB            1 SAB                 Social History     Tobacco Use   Smoking Status Former Smoker    Packs/day: 0.50    Years: 30.00    Pack years: 15.00    Quit date: 2015    Years since quittin.2   Smokeless Tobacco Never Used        Social History     Substance and Sexual Activity   Alcohol Use Not Currently       Allergies: Darvon [propoxyphene]      Current Outpatient Medications:     clobetasol (TEMOVATE) 0.05 % ointment, Apply topically 2 times daily. , Disp: 45 g, Rfl: 1    clotrimazole-betamethasone (LOTRISONE) 1-0.05 % cream, Apply topically 2 times daily. , Disp: 45 g, Rfl: 1    atorvastatin (LIPITOR) 40 MG tablet, Take 40 mg by mouth daily, Disp: , Rfl:     losartan (COZAAR) 25 MG tablet, Take 25 mg by mouth daily, Disp: , Rfl:     sertraline (ZOLOFT) 100 MG tablet, Take 100 mg by mouth daily, Disp: , Rfl:     Fluticasone Furoate-Vilanterol (BREO ELLIPTA IN), Inhale into the lungs as needed , Disp: , Rfl:     metoprolol succinate (TOPROL XL) 25 MG extended release tablet, Take 25 mg by mouth daily , Disp: , Rfl:     metFORMIN (GLUCOPHAGE) 500 MG tablet, Take 500 mg by mouth daily , Disp: , Rfl:     ASPIRIN PO, Take 81 mg by mouth daily , Disp: , Rfl:     dexamethasone (DECADRON) 6 MG tablet, Take 6 mg by mouth daily (with breakfast) (Patient not taking: Reported on 3/24/2022), Disp: , Rfl: Social History     Substance and Sexual Activity   Sexual Activity Not Currently       Last Yearly:  2019    Last pap: 2019    Last HPV: unknown    Last mammo 2019    Have you had a positive covid test: Yes    Have you had the covid immunization: Yes    Chief Complaint   Patient presents with    Vaginal Itching     C/O vaginal itch and irritation for 4-6 weeks. H/O lichen sclerosis. PE:  Vital Signs  Blood pressure 138/72, height 5' 3\" (1.6 m), weight 144 lb (65.3 kg), not currently breastfeeding. Estimated body mass index is 25.51 kg/m² as calculated from the following:    Height as of this encounter: 5' 3\" (1.6 m). Weight as of this encounter: 144 lb (65.3 kg). No data recorded    NURSE: García HURT    HPI: patient here for recurrent vulvar irritation , hx of lichen planus; also requests definitive tx of her pelvic floor prolapse issues, also desires \"old hemorrhoids get fixed\"     PT denies fever, chills, nausea and vomiting       Objective   No acute distress  Excellent communications  Well-nourished              Pelvic Exam: GENITAL/URINARY:  External Genitalia:  Hair distribution; normal, l. Minora edematous bilaterally with thin tissue some blanched appearing as lichen; bilateral groins through l. Majora appear as cutaneous candidiasis  Urethral Meatus:  Size normal, Location normal, Lesions absent, Prolapse absent  Urethra: Fullness absent, Masses absent  Bladder:  Fullness absent, Masses absent, Tenderness absent, cystocele gr 2  Vagina:  General appearance normal, Estrogen effect normal, Discharge absent, Lesions absent, cervix low in vaginal vault  Cervix:  General appearance normal, Lesions absent, Discharge absent, Tenderness absent, Enlargement absent, Nodularity absent  Uterus:  Size normal, Tenderness absent  Adenexa:   Masses absent, Tenderness absent  Anus/Perineum: multiple tag type projections but atypical possibly lichenoid characteristics Physical Exam  Genitourinary:                                 Results reviewed today:    No results found for this visit on 03/24/22. Assessment and Plan          Diagnosis Orders   1. Lichen planus  clobetasol (TEMOVATE) 0.05 % ointment   2. Uterine prolapse     3. Cystocele with prolapse     4. Perianal lesion  [facility] [place], General Surgery, [place]   5. Hemorrhoidal skin tags  [place], [place], General Surgery, [place]   6. Screening mammogram, encounter for  [facility] FRACISCO DIGITAL SCREEN BILATERAL   7. Cutaneous candidiasis  clotrimazole-betamethasone (LOTRISONE) 1-0.05 % cream             I am having Kaitlin Krishna start on clobetasol and clotrimazole-betamethasone. I am also having her maintain her metoprolol succinate, metFORMIN, ASPIRIN PO, Fluticasone Furoate-Vilanterol (BREO ELLIPTA IN), atorvastatin, losartan, sertraline, and dexamethasone. Return for surgical discussion Dr. Terry Zepeda uterine prolapse and cystocele. There are no Patient Instructions on file for this visit. Over 50% of time spent on counseling and care coordination on: see assessment and plan,  She was also counseled on her preventative health maintenance recommendations and follow-up.         FF time: 20 min      LUDWIG Osei CNM,3/24/2022 10:17 AM

## 2022-04-26 ENCOUNTER — OFFICE VISIT (OUTPATIENT)
Dept: OBGYN | Age: 77
End: 2022-04-26
Payer: MEDICARE

## 2022-04-26 VITALS
SYSTOLIC BLOOD PRESSURE: 124 MMHG | HEIGHT: 63 IN | BODY MASS INDEX: 25.34 KG/M2 | WEIGHT: 143 LBS | DIASTOLIC BLOOD PRESSURE: 86 MMHG

## 2022-04-26 DIAGNOSIS — N81.4 UTERINE PROLAPSE: Primary | ICD-10-CM

## 2022-04-26 DIAGNOSIS — N81.11 CYSTOCELE, MIDLINE: ICD-10-CM

## 2022-04-26 PROCEDURE — 57160 INSERT PESSARY/OTHER DEVICE: CPT | Performed by: OBSTETRICS & GYNECOLOGY

## 2022-04-26 ASSESSMENT — PATIENT HEALTH QUESTIONNAIRE - PHQ9
SUM OF ALL RESPONSES TO PHQ QUESTIONS 1-9: 0
SUM OF ALL RESPONSES TO PHQ QUESTIONS 1-9: 0
SUM OF ALL RESPONSES TO PHQ9 QUESTIONS 1 & 2: 0
SUM OF ALL RESPONSES TO PHQ QUESTIONS 1-9: 0
SUM OF ALL RESPONSES TO PHQ QUESTIONS 1-9: 0
2. FEELING DOWN, DEPRESSED OR HOPELESS: 0
1. LITTLE INTEREST OR PLEASURE IN DOING THINGS: 0

## 2022-04-26 NOTE — PROGRESS NOTES
DATE OF VISIT:  4/26/22    PATIENT NAME:  Siddharth Kasper OF BIRTH: 1945    REASON FOR VISIT:    Chief Complaint   Patient presents with    Uterine Prolapse     Patient complains of vaginal bulge and was told that she has uterine prolapse. She denies any pain. She would like to discuss options. HISTORY OF PRESENT ILLNESS:  Pt with uterovaginal prolapse - considered surgery but after discussion wonders what else she could do; disc'd pessary and patient open to trial of one      No LMP recorded (lmp unknown). Patient is postmenopausal.  Vitals:    04/26/22 1056   BP: 124/86   Weight: 143 lb (64.9 kg)   Height: 5' 3\" (1.6 m)     Body mass index is 25.33 kg/m². Allergies   Allergen Reactions    Darvon [Propoxyphene]      Current Outpatient Medications   Medication Sig Dispense Refill    clobetasol (TEMOVATE) 0.05 % ointment Apply topically 2 times daily. 45 g 1    clotrimazole-betamethasone (LOTRISONE) 1-0.05 % cream Apply topically 2 times daily. 45 g 1    atorvastatin (LIPITOR) 40 MG tablet Take 40 mg by mouth daily      losartan (COZAAR) 25 MG tablet Take 25 mg by mouth daily      sertraline (ZOLOFT) 100 MG tablet Take 100 mg by mouth daily      Fluticasone Furoate-Vilanterol (BREO ELLIPTA IN) Inhale into the lungs as needed       metoprolol succinate (TOPROL XL) 25 MG extended release tablet Take 25 mg by mouth daily       metFORMIN (GLUCOPHAGE) 500 MG tablet Take 500 mg by mouth daily       ASPIRIN PO Take 81 mg by mouth daily       dexamethasone (DECADRON) 6 MG tablet Take 6 mg by mouth daily (with breakfast) (Patient not taking: Reported on 3/24/2022)       No current facility-administered medications for this visit. Social History     Socioeconomic History    Marital status:       Spouse name: None    Number of children: None    Years of education: None    Highest education level: None   Occupational History    None   Tobacco Use    Smoking status: Former kg/m²   Physical Exam  Constitutional:       Appearance: Normal appearance. Genitourinary:      Vulva normal.      Anterior and apical vaginal prolapse present. Vaginal exam comments: Grade 3. HENT:      Head: Normocephalic and atraumatic. Eyes:      Pupils: Pupils are equal, round, and reactive to light. Cardiovascular:      Rate and Rhythm: Normal rate. Pulmonary:      Effort: Pulmonary effort is normal.   Musculoskeletal:         General: Normal range of motion. Neurological:      Mental Status: She is alert and oriented to person, place, and time. Psychiatric:         Mood and Affect: Mood normal.         Behavior: Behavior normal.         Thought Content: Thought content normal.         Judgment: Judgment normal.       Sized patient with pessary kit 2 3/4 size donut best fit - will order  ASSESSMENT:      1. Uterine prolapse    2. Cystocele, midline        PLAN:  No orders of the defined types were placed in this encounter. Return in about 2 weeks (around 5/10/2022) for follow up.        Electronically signed by Vinny Trujillo DO on 04/26/22

## 2022-10-13 ENCOUNTER — TELEPHONE (OUTPATIENT)
Dept: OBGYN | Age: 77
End: 2022-10-13

## 2022-10-13 ENCOUNTER — HOSPITAL ENCOUNTER (OUTPATIENT)
Dept: WOMENS IMAGING | Age: 77
Discharge: HOME | End: 2022-10-15
Payer: MEDICARE

## 2022-10-13 DIAGNOSIS — Z12.31 SCREENING MAMMOGRAM, ENCOUNTER FOR: ICD-10-CM

## 2022-10-13 DIAGNOSIS — R92.8 ABNORMAL MAMMOGRAM OF LEFT BREAST: Primary | ICD-10-CM

## 2022-10-13 PROCEDURE — 77063 BREAST TOMOSYNTHESIS BI: CPT

## 2022-10-13 NOTE — RESULT ENCOUNTER NOTE
Pt. notified of results and voices understanding. Orders entered and patient transferred to scheduling to schedule additional imaging.

## 2022-10-31 ENCOUNTER — HOSPITAL ENCOUNTER (OUTPATIENT)
Dept: WOMENS IMAGING | Age: 77
Discharge: HOME | End: 2022-11-02
Payer: MEDICARE

## 2022-10-31 ENCOUNTER — HOSPITAL ENCOUNTER (OUTPATIENT)
Dept: ULTRASOUND IMAGING | Age: 77
Discharge: HOME | End: 2022-11-02
Payer: MEDICARE

## 2022-10-31 DIAGNOSIS — R92.8 ABNORMAL MAMMOGRAM OF LEFT BREAST: ICD-10-CM

## 2022-10-31 PROCEDURE — 76642 ULTRASOUND BREAST LIMITED: CPT

## 2023-02-24 ENCOUNTER — HOSPITAL ENCOUNTER (OUTPATIENT)
Age: 78
Discharge: HOME | End: 2023-02-24
Payer: MEDICARE

## 2023-02-24 ENCOUNTER — OFFICE VISIT (OUTPATIENT)
Dept: CARDIOLOGY | Age: 78
End: 2023-02-24
Payer: MEDICARE

## 2023-02-24 VITALS
WEIGHT: 139.2 LBS | BODY MASS INDEX: 23.76 KG/M2 | HEIGHT: 64 IN | OXYGEN SATURATION: 99 % | SYSTOLIC BLOOD PRESSURE: 169 MMHG | HEART RATE: 56 BPM | DIASTOLIC BLOOD PRESSURE: 71 MMHG | RESPIRATION RATE: 16 BRPM

## 2023-02-24 DIAGNOSIS — J44.9 CHRONIC OBSTRUCTIVE PULMONARY DISEASE, UNSPECIFIED COPD TYPE (HCC): ICD-10-CM

## 2023-02-24 DIAGNOSIS — E78.2 MIXED HYPERLIPIDEMIA: ICD-10-CM

## 2023-02-24 DIAGNOSIS — R06.02 SOB (SHORTNESS OF BREATH): ICD-10-CM

## 2023-02-24 DIAGNOSIS — I34.0 MODERATE MITRAL REGURGITATION: ICD-10-CM

## 2023-02-24 DIAGNOSIS — R00.2 PALPITATIONS: ICD-10-CM

## 2023-02-24 DIAGNOSIS — I34.0 MODERATE MITRAL REGURGITATION: Primary | ICD-10-CM

## 2023-02-24 DIAGNOSIS — R94.31 ABNORMAL ECG: ICD-10-CM

## 2023-02-24 DIAGNOSIS — R53.83 TIREDNESS: ICD-10-CM

## 2023-02-24 LAB
ANION GAP SERPL CALCULATED.3IONS-SCNC: 10 MMOL/L (ref 9–17)
BUN SERPL-MCNC: 18 MG/DL (ref 8–23)
BUN/CREAT BLD: 24 (ref 9–20)
CALCIUM SERPL-MCNC: 10.4 MG/DL (ref 8.6–10.4)
CHLORIDE SERPL-SCNC: 104 MMOL/L (ref 98–107)
CHOLEST SERPL-MCNC: 158 MG/DL
CHOLESTEROL/HDL RATIO: 3.7
CO2 SERPL-SCNC: 27 MMOL/L (ref 20–31)
CREAT SERPL-MCNC: 0.75 MG/DL (ref 0.5–0.9)
GFR SERPL CREATININE-BSD FRML MDRD: >60 ML/MIN/1.73M2
GLUCOSE SERPL-MCNC: 102 MG/DL (ref 70–99)
HCT VFR BLD AUTO: 39.3 % (ref 36.3–47.1)
HDLC SERPL-MCNC: 43 MG/DL
HGB BLD-MCNC: 13.1 G/DL (ref 11.9–15.1)
LDLC SERPL CALC-MCNC: 92 MG/DL (ref 0–130)
MCH RBC QN AUTO: 32.4 PG (ref 25.2–33.5)
MCHC RBC AUTO-ENTMCNC: 33.3 G/DL (ref 28.4–34.8)
MCV RBC AUTO: 97.3 FL (ref 82.6–102.9)
NRBC AUTOMATED: 0 PER 100 WBC
PDW BLD-RTO: 11.9 % (ref 11.8–14.4)
PLATELET # BLD AUTO: 276 K/UL (ref 138–453)
PMV BLD AUTO: 9.4 FL (ref 8.1–13.5)
POTASSIUM SERPL-SCNC: 4.6 MMOL/L (ref 3.7–5.3)
RBC # BLD: 4.04 M/UL (ref 3.95–5.11)
SODIUM SERPL-SCNC: 141 MMOL/L (ref 135–144)
TRIGL SERPL-MCNC: 113 MG/DL
WBC # BLD AUTO: 5.6 K/UL (ref 3.5–11.3)

## 2023-02-24 PROCEDURE — 85027 COMPLETE CBC AUTOMATED: CPT

## 2023-02-24 PROCEDURE — 93005 ELECTROCARDIOGRAM TRACING: CPT | Performed by: PHYSICIAN ASSISTANT

## 2023-02-24 PROCEDURE — 80048 BASIC METABOLIC PNL TOTAL CA: CPT

## 2023-02-24 PROCEDURE — 80061 LIPID PANEL: CPT

## 2023-02-24 PROCEDURE — 36415 COLL VENOUS BLD VENIPUNCTURE: CPT

## 2023-02-24 RX ORDER — METOPROLOL SUCCINATE 25 MG/1
12.5 TABLET, EXTENDED RELEASE ORAL DAILY
Qty: 90 TABLET | Refills: 3 | Status: SHIPPED
Start: 2023-02-24 | End: 2023-05-25

## 2023-02-24 NOTE — PATIENT INSTRUCTIONS
SURVEY:    You may be receiving a survey from [employer] regarding your visit today. Please complete the survey to enable us to provide the highest quality of care to you and your family. If you cannot score us a very good on any question, please call the office to discuss how we could have made your experience a very good one. Thank you.

## 2023-02-24 NOTE — PROGRESS NOTES
Patient: Catie Matthew  : 1945  Date of Visit: 2023    REASON FOR VISIT / CONSULTATION: Follow-up (Hx:Palps,SOB,SOB,Mod. Mitral Regurg. She has some tiredness and palpitations. Can happen while she's doing nothing, they are annoying. Last one and off all day. Did have vertigo for 3-4 days about 4 months ago. //Denies: CP, SOB, Lightheaded/dizziness. )      History of Present Illness:        Dear LUDWIG Shahid - CNP    I had the pleasure of seeing Catie Matthew today. Ms. Rudy Hernandez is a 78 y.o. female who presented for evaluation and to establish care. She has long history of type 2 diabetes on Metformin, dyslipidemia on Lipitor and hypertension. She also has intermittent palpitations. She had a kidney stone back in 2021 and an echo done at that time showed moderate mitral regurgitation. She also said she was diagnosed with COPD by Dr. Karen Mercado. She is a former smoker who smoked 1/2 ppd for 30 years. She quit in . With regard to her family history. She said there is no heart disease in the family except for her son who is 54-year-old when he got heart attack post Covid. Other than this, no immediate family members with premature CAD. Risk Factors:  Age  Hypertension  Hyperlipidemia  Diabetes  Former Smoker    EKG done today in office on 10/8/2021: Sinus rhythm with left axis deviation and moderate voltage criteria for LVH. EKG done in office today 2023 was sinus bradycardia, heart rate 58 bpm.    Ms. Rudy Hernandez is here today for a follow up appointment. She reports she has intermittent palpitations. She reports they occur once per week. Some days it occurs a lot and other days it does not happen as frequently. She states it is moderately bothersome. She does have vertigo, some lightheaded and dizziness but nothing recent. She reports she was walking sideways, lasted 3 days and resolved. She denies any chest pain or pressure.   She reports she does not use the Breo for her breathing anymore. She reports she feels very tired and out of breath with activity. She is complaining of chronic shortness of breath on exertion which she is always attributing it to her COPD. No presyncope or syncopal episodes. No chest pain, pressure or tightness. No fever or cough. No orthopnea or PND but reported having trouble sleeping at night. She just found out her son has stage 3 colorectal cancer. She reports she is having a harder time taking care of her house and feeling more tired. She denied any current or recent chest pain, pressure or tightness. She denies having any abdominal pain, nausea or vomiting. No cough, fever or chills. No bleeding problems, problems with her medications or any other concerns at this time. Exercise Tolerance: Ms. Balbir Gongora reports that she has a fairly good exercise tolerance. Her says that she could walk 1 mile without developing chest discomfort or significant shortness of breath. She has 4 great grand kids she helps care for. She doesn't regularly exercise. PAST MEDICAL HISTORY:        Past Medical History:   Diagnosis Date    COPD (chronic obstructive pulmonary disease) ([facility] [address]     Diabetes mellitus ([facility] [address]     Hyperlipidemia     Hypertension     Lichen planus        CURRENT ALLERGIES: Darvon [propoxyphene] REVIEW OF SYSTEMS: 14 systems were reviewed. Pertinent positives and negatives as above, all else negative.      Past Surgical History:   Procedure Laterality Date    COLONOSCOPY  2016    -hemorrhoids    DENTAL SURGERY      JOINT REPLACEMENT      right total knee    LITHOTRIPSY      Social History:  Social History     Tobacco Use    Smoking status: Former     Packs/day: 0.50     Years: 30.00     Pack years: 15.00     Types: Cigarettes     Quit date: 2015     Years since quittin.1    Smokeless tobacco: Never   Substance Use Topics    Alcohol use: Not Currently    Drug use: Never        CURRENT MEDICATIONS: Outpatient Medications Marked as Taking for the 2/24/23 encounter (Office Visit) with Rush Moore PA-C   Medication Sig Dispense Refill    metoprolol succinate (TOPROL XL) 25 MG extended release tablet Take 0.5 tablets by mouth daily 90 tablet 3    clobetasol (TEMOVATE) 0.05 % ointment Apply topically 2 times daily. 45 g 1    clotrimazole-betamethasone (LOTRISONE) 1-0.05 % cream Apply topically 2 times daily. 45 g 1    atorvastatin (LIPITOR) 40 MG tablet Take 40 mg by mouth daily      losartan (COZAAR) 25 MG tablet Take 25 mg by mouth daily      sertraline (ZOLOFT) 100 MG tablet Take 100 mg by mouth daily      Fluticasone Furoate-Vilanterol (BREO ELLIPTA IN) Inhale into the lungs as needed       metFORMIN (GLUCOPHAGE) 500 MG tablet Take 500 mg by mouth daily       ASPIRIN PO Take 81 mg by mouth daily          FAMILY HISTORY: family history includes Cervical Cancer in her daughter; Other in an other family member. Physical Examination:     BP (!) 169/71 (Site: Right Upper Arm, Position: Sitting, Cuff Size: Medium Adult)   Pulse 56   Resp 16   Ht 5' 3.5\" (1.613 m)   Wt 139 lb 3.2 oz (63.1 kg)   LMP  (LMP Unknown)   SpO2 99%   BMI 24.27 kg/m²  Body mass index is 24.27 kg/m². Constitutional: She is oriented to person, place, and time. She appears well-developed and well-nourished. In no acute distress. HEENT: Normocephalic and atraumatic. No JVD present. Carotid bruit is not present. No mass and no thyromegaly present. No lymphadenopathy present. Cardiovascular: Bradycardic rate, regular rhythm, normal heart sounds. Exam reveals no gallop and no friction rubs. 2/6 systolic murmur, 5th intercostal space on the LEFT in the mid-clavicular line (cardiac apex). Pulmonary/Chest: Poor air entry bilaterally. No significant wheezes or crackles. Abdominal: Soft, non-tender. Bowel sounds and aorta are normal. She exhibits no organomegaly, mass or bruit. Extremities: No significant edema.   No cyanosis or clubbing. 2+ radial and carotid pulses. Distal extremity pulses: 2+ bilaterally. Neurological: She is alert and oriented to person, place, and time. No evidence of gross cranial nerve deficit. Coordination appeared normal.   Skin: Skin is warm and dry. There is no rash or diaphoresis. Psychiatric: She has a normal mood and affect. Her speech is normal and behavior is normal.        MOST RECENT LABS ON RECORD:   Lab Results   Component Value Date    WBC 8.7 01/10/2017    HGB 11.3 (L) 01/10/2017    HCT 33.4 (L) 01/10/2017     01/10/2017    CHOL 160 05/18/2021    TRIG 106 05/18/2021    HDL 45 05/18/2021    ALT 28 09/23/2014    AST 22 09/23/2014     05/28/2021    K 4.2 05/28/2021     05/28/2021    CREATININE 0.93 (H) 05/28/2021    BUN 19 05/28/2021    CO2 27 05/28/2021    TSH 0.32 05/28/2021    LABA1C 6.1 (H) 05/18/2021       ASSESSMENT:     1. Moderate mitral regurgitation    2. Palpitations    3. SOB (shortness of breath)    4. Abnormal ECG    5. Chronic obstructive pulmonary disease, unspecified COPD type ([address]    6. Mixed hyperlipidemia         PLAN:        Moderate Mitral Regurgitation:  Complaining of chronic shortness of breath but she attributes this to her COPD. No clinical signs of volume overload. I reviewed her echo, I told her we just need to get a repeat echo after 1 year to reevaluate her mitral regurgitation. We discussed the natural history of mitral valve regurgitation in the new modalities of transvascular mitral intervention if needed. Continue losartan and Toprol-XL. Paroxysmal Recurrent intermittent palpitations: Rate Control Symptomatic  Beta Blocker: DECREASE to Metoprolol succinate (Toprol XL) 25 mg 1/2 tab daily. I reviewed her prior Holter monitor from June 2021: Sinus rhythm with average heart rate of 63 bpm ranging between 48 and 87 bpm.  Rare isolated PVCs. Occasional PACs with occasional couplets. PVCs burden 2%.   One atrial run of 6 beats at heart rate 154 bpm.  I will order a Cam monitor for longer monitoring time. The main reason for this is to assess the frequency of her atrial tachycardia and to assess her chronotropic incompetence. Shortness of Breath:   Abnormal ECG  Although her shortness of breath can be easily explained by her COPD, patient has multiple risk factors for CAD including age, hypertension, dyslipidemia and longstanding diabetes. Her ECG is abnormal as above. I think getting stress test is needed to rule out ischemic etiology of her shortness of breath that can be an anginal equivalent. Continue aspirin, Toprol-XL and Lipitor for now. Essential Hypertension: Controlled  Beta Blocker: DECREASE to Metoprolol succinate (Toprol XL) 25 mg 1/2 tab daily. ACE Inibitor/ARB: Continue losartan (Cozaar) 25 mg daily. She reports her BP was in 130's at her last appointment last week. I will have monitor BP at home over the next week and call with a BP log. Hyperlipidemia: Mixed - Last LDL on 5/18/2021 was 94 mg/dL   Cholesterol Reduction Therapy: Continue Atorvastatin (Lipitor) 40 mg daily. I discussed the potential benefits of statin therapy as well as the potential risks including myalgia as well as the rare but potentially serious complication of liver or kidney damage. Although rare, I told them that this could be serious and therefore told them to stop the medication immediately and call if they developed any severe muscle aches or pains and they agreed to do so. Will consider increasing her Lipitor to 80 mg or switching her to Crestor particularly if her stress test came back abnormal.    I ordered a repeat lipid to be done. Tiredness: I did discuss concern for chronotropic incompetence. CAM and stress test as above. Diabetes: Type 2   Continue Metformin  Hemoglobin A1c 6.1% on 5/18/21. In the meantime, I encouraged Ms. Claire Hernandez to continue to take her other medications. FOLLOW UP:   I told Ms. Claire Hernandez to call my office if she had any problems, but otherwise I asked her to Return in about 6 weeks (around 4/7/2023). However, I would be happy to see her sooner should the need arise.     Sincerely,  Geni Lee PA-C  [facility]iology Specialist    [address] [place] [id number]  Phone: [phone], Fax: [phone]     I believe that the risk of significant morbidity and mortality related to the patient's current medical conditions are: intermediate-high. Approximately 50 minutes were spent during prep work, discussion and exam of the patient, and follow up documentation and all of their questions were answered.      February 24, 2023

## 2023-02-27 ENCOUNTER — TELEPHONE (OUTPATIENT)
Dept: CARDIOLOGY | Age: 78
End: 2023-02-27

## 2023-02-27 RX ORDER — ATORVASTATIN CALCIUM 80 MG/1
80 TABLET, FILM COATED ORAL DAILY
Qty: 90 TABLET | Refills: 3 | Status: SHIPPED | OUTPATIENT
Start: 2023-02-27 | End: 2023-05-28

## 2023-02-27 NOTE — TELEPHONE ENCOUNTER
----- Message from Lindsey Jarquin PA-C sent at 2/27/2023 11:36 AM EST -----  Please notify patient that their lab results are normal. LDL was elevated, increase lipitor to 80 mg. Please continue current treatment and follow up.

## 2023-03-03 ENCOUNTER — TELEPHONE (OUTPATIENT)
Dept: CARDIOLOGY | Age: 78
End: 2023-03-03

## 2023-03-03 RX ORDER — LOSARTAN POTASSIUM 50 MG/1
50 TABLET ORAL DAILY
Qty: 90 TABLET | Refills: 3 | Status: SHIPPED | OUTPATIENT
Start: 2023-03-03 | End: 2023-03-10

## 2023-03-09 ENCOUNTER — HOSPITAL ENCOUNTER (OUTPATIENT)
Age: 78
Discharge: HOME | End: 2023-03-09
Payer: MEDICARE

## 2023-03-09 LAB
ALBUMIN SERPL-MCNC: 4.5 G/DL (ref 3.5–5.2)
ALBUMIN/GLOBULIN RATIO: 1.6 (ref 1–2.5)
ALP SERPL-CCNC: 98 U/L (ref 35–104)
ALT SERPL-CCNC: 14 U/L (ref 5–33)
ANION GAP SERPL CALCULATED.3IONS-SCNC: 12 MMOL/L
AST SERPL-CCNC: 15 U/L
BILIRUB SERPL-MCNC: 1 MG/DL (ref 0.3–1.2)
BUN SERPL-MCNC: 19 MG/DL (ref 8–23)
BUN/CREAT BLD: 23 (ref 9–20)
CALCIUM SERPL-MCNC: 10.1 MG/DL (ref 8.6–10.4)
CHLORIDE SERPL-SCNC: 104 MMOL/L (ref 98–107)
CHOLEST SERPL-MCNC: 152 MG/DL
CHOLESTEROL/HDL RATIO: 3.3
CO2 SERPL-SCNC: 26 MMOL/L (ref 20–31)
CREAT SERPL-MCNC: 0.83 MG/DL (ref 0.5–0.9)
CREATININE URINE: 158.3 MG/DL (ref 28–217)
EST. AVERAGE GLUCOSE BLD GHB EST-MCNC: 131 MG/DL
GFR SERPL CREATININE-BSD FRML MDRD: >60 ML/MIN/1.73M2
GLUCOSE SERPL-MCNC: 100 MG/DL (ref 70–99)
HBA1C MFR BLD: 6.2 % (ref 4–6)
HCT VFR BLD AUTO: 37.5 % (ref 36.3–47.1)
HDLC SERPL-MCNC: 46 MG/DL
HGB BLD-MCNC: 12.5 G/DL (ref 11.9–15.1)
LDLC SERPL CALC-MCNC: 87 MG/DL (ref 0–130)
MCH RBC QN AUTO: 31.6 PG (ref 25.2–33.5)
MCHC RBC AUTO-ENTMCNC: 33.3 G/DL (ref 28.4–34.8)
MCV RBC AUTO: 94.9 FL (ref 82.6–102.9)
MICROALBUMIN/CREAT 24H UR: 48 MG/L
MICROALBUMIN/CREAT UR-RTO: 30 MCG/MG CREAT
NRBC AUTOMATED: 0 PER 100 WBC
PDW BLD-RTO: 11.9 % (ref 11.8–14.4)
PLATELET # BLD AUTO: 372 K/UL (ref 138–453)
PMV BLD AUTO: 9.1 FL (ref 8.1–13.5)
POTASSIUM SERPL-SCNC: 4 MMOL/L (ref 3.7–5.3)
PROT SERPL-MCNC: 7.3 G/DL (ref 6.4–8.3)
RBC # BLD: 3.95 M/UL (ref 3.95–5.11)
SODIUM SERPL-SCNC: 142 MMOL/L (ref 135–144)
T4 FREE SERPL-MCNC: 1.06 NG/DL (ref 0.93–1.7)
TRIGL SERPL-MCNC: 95 MG/DL
TSH SERPL-ACNC: 0.2 UIU/ML (ref 0.3–5)
VIT B12 SERPL-MCNC: 590 PG/ML (ref 232–1245)
WBC # BLD AUTO: 9 K/UL (ref 3.5–11.3)

## 2023-03-09 PROCEDURE — 84443 ASSAY THYROID STIM HORMONE: CPT

## 2023-03-09 PROCEDURE — 82607 VITAMIN B-12: CPT

## 2023-03-09 PROCEDURE — 36415 COLL VENOUS BLD VENIPUNCTURE: CPT

## 2023-03-09 PROCEDURE — 85027 COMPLETE CBC AUTOMATED: CPT

## 2023-03-09 PROCEDURE — 83036 HEMOGLOBIN GLYCOSYLATED A1C: CPT

## 2023-03-09 PROCEDURE — 80053 COMPREHEN METABOLIC PANEL: CPT

## 2023-03-09 PROCEDURE — 82570 ASSAY OF URINE CREATININE: CPT

## 2023-03-09 PROCEDURE — 84439 ASSAY OF FREE THYROXINE: CPT

## 2023-03-09 PROCEDURE — 80061 LIPID PANEL: CPT

## 2023-03-09 PROCEDURE — 82043 UR ALBUMIN QUANTITATIVE: CPT

## 2023-03-10 ENCOUNTER — TELEPHONE (OUTPATIENT)
Dept: CARDIOLOGY | Age: 78
End: 2023-03-10

## 2023-03-10 RX ORDER — LOSARTAN POTASSIUM 50 MG/1
75 TABLET ORAL DAILY
Qty: 90 TABLET | Refills: 3
Start: 2023-03-10

## 2023-03-10 NOTE — PROGRESS NOTES
Blood pressure elevated. Please increase losartan to 75 mg daily and call on Monday with BP reading, I anticipate we will need to increase it to 100 mg daily.

## 2023-03-29 ENCOUNTER — HOSPITAL ENCOUNTER (OUTPATIENT)
Dept: NON INVASIVE DIAGNOSTICS | Age: 78
Discharge: HOME | End: 2023-03-29
Payer: MEDICARE

## 2023-03-29 DIAGNOSIS — R00.2 PALPITATIONS: ICD-10-CM

## 2023-03-29 DIAGNOSIS — R94.31 ABNORMAL ECG: ICD-10-CM

## 2023-03-29 DIAGNOSIS — E78.2 MIXED HYPERLIPIDEMIA: ICD-10-CM

## 2023-03-29 DIAGNOSIS — I34.0 MODERATE MITRAL REGURGITATION: ICD-10-CM

## 2023-03-29 DIAGNOSIS — J44.9 CHRONIC OBSTRUCTIVE PULMONARY DISEASE, UNSPECIFIED COPD TYPE (HCC): ICD-10-CM

## 2023-03-29 DIAGNOSIS — R06.02 SOB (SHORTNESS OF BREATH): ICD-10-CM

## 2023-03-29 LAB
LV EF: 60 %
LVEF MODALITY: NORMAL

## 2023-03-29 PROCEDURE — 93306 TTE W/DOPPLER COMPLETE: CPT

## 2023-03-29 PROCEDURE — 93243 EXT ECG>48HR<7D SCAN A/R: CPT

## 2023-03-29 PROCEDURE — 93017 CV STRESS TEST TRACING ONLY: CPT

## 2023-03-29 PROCEDURE — 93242 EXT ECG>48HR<7D RECORDING: CPT

## 2023-03-30 ENCOUNTER — TELEPHONE (OUTPATIENT)
Dept: CARDIOLOGY | Age: 78
End: 2023-03-30

## 2023-03-30 DIAGNOSIS — R53.83 TIREDNESS: ICD-10-CM

## 2023-03-30 DIAGNOSIS — R94.39 ABNORMAL STRESS ECG WITH TREADMILL: ICD-10-CM

## 2023-03-30 DIAGNOSIS — R06.02 SOB (SHORTNESS OF BREATH): Primary | ICD-10-CM

## 2023-03-30 DIAGNOSIS — R00.2 PALPITATIONS: ICD-10-CM

## 2023-03-30 NOTE — TELEPHONE ENCOUNTER
----- Message from Radha Ramirez PA-C sent at 3/30/2023 10:16 AM EDT -----  Please let them know their echo looks good, will discuss at follow up appointment. Thanks.

## 2023-03-30 NOTE — PROCEDURES
during recovery, the patient developed:    Upsloping ST segment changes in leads II, III, AVF, V4, V5, V6, which  did meet diagnostic criteria for myocardial ischemia with no premature  atrial contractions (PACs) and no premature ventricular contractions  (PVCs). IMPRESSION:  1. Limited study, patient only achieved 80% of the maximum predicted  heart rate. 2.  Significant electrocardiographic evidence of myocardial ischemia  during EKG monitoring without significant associated arrhythmias. The patient's Duke Treadmill score is -1 which correlates with an  intermediate risk for significant coronary artery disease. Based on the patient's abnormal exercise stress test results, a repeat  study with the addition of cardiac imaging is recommended. The sensitivity for detecting ischemia on this test may have been  reduced due to the patient being on a beta blocker.         Jake Palacios MD    D: 03/30/2023 12:34:37       T: 03/30/2023 12:36:06     ARON/ROSAURA  Job#: [id number]     Doc#: Unknown    CC:  YSABEL Simons APRN-CNP

## 2023-03-31 ENCOUNTER — TELEPHONE (OUTPATIENT)
Dept: CARDIOLOGY | Age: 78
End: 2023-03-31

## 2023-03-31 NOTE — TELEPHONE ENCOUNTER
----- Message from Olga Leigh PA-C sent at 3/30/2023 11:50 PM EDT -----  Please let them know that their stress test was abnromal. She will need a stress test with imaging. Please schedule prior to her appointment on 4/18. Thanks.

## 2023-03-31 NOTE — RESULT ENCOUNTER NOTE
Please let them know that their stress test was abnromal. She will need a stress test with imaging. Please schedule prior to her appointment on 4/18. Thanks.

## 2023-04-17 ENCOUNTER — TELEPHONE (OUTPATIENT)
Dept: CARDIOLOGY | Age: 78
End: 2023-04-17

## 2023-04-17 NOTE — TELEPHONE ENCOUNTER
----- Message from Marianne Peters PA-C sent at 4/16/2023 10:20 PM EDT -----  Please let them know their stress test looked good, it was low risk. Will discuss at follow up. Thanks.

## 2023-04-18 ENCOUNTER — OFFICE VISIT (OUTPATIENT)
Dept: CARDIOLOGY | Age: 78
End: 2023-04-18
Payer: MEDICARE

## 2023-04-18 VITALS
WEIGHT: 138 LBS | DIASTOLIC BLOOD PRESSURE: 69 MMHG | OXYGEN SATURATION: 98 % | RESPIRATION RATE: 18 BRPM | SYSTOLIC BLOOD PRESSURE: 154 MMHG | HEIGHT: 64 IN | BODY MASS INDEX: 23.56 KG/M2 | HEART RATE: 57 BPM

## 2023-04-18 DIAGNOSIS — I34.0 MODERATE MITRAL REGURGITATION: Primary | ICD-10-CM

## 2023-04-18 DIAGNOSIS — R00.2 PALPITATIONS: ICD-10-CM

## 2023-04-18 DIAGNOSIS — J44.9 CHRONIC OBSTRUCTIVE PULMONARY DISEASE, UNSPECIFIED COPD TYPE (HCC): ICD-10-CM

## 2023-04-18 DIAGNOSIS — R06.02 SOB (SHORTNESS OF BREATH): ICD-10-CM

## 2023-04-18 DIAGNOSIS — R94.31 ABNORMAL ECG: ICD-10-CM

## 2023-04-18 DIAGNOSIS — E78.2 MIXED HYPERLIPIDEMIA: ICD-10-CM

## 2023-04-18 PROCEDURE — G8420 CALC BMI NORM PARAMETERS: HCPCS | Performed by: INTERNAL MEDICINE

## 2023-04-18 PROCEDURE — 3017F COLORECTAL CA SCREEN DOC REV: CPT | Performed by: INTERNAL MEDICINE

## 2023-04-18 PROCEDURE — 3077F SYST BP >= 140 MM HG: CPT | Performed by: INTERNAL MEDICINE

## 2023-04-18 PROCEDURE — 3023F SPIROM DOC REV: CPT | Performed by: INTERNAL MEDICINE

## 2023-04-18 PROCEDURE — 1090F PRES/ABSN URINE INCON ASSESS: CPT | Performed by: INTERNAL MEDICINE

## 2023-04-18 PROCEDURE — 3078F DIAST BP <80 MM HG: CPT | Performed by: INTERNAL MEDICINE

## 2023-04-18 PROCEDURE — G8399 PT W/DXA RESULTS DOCUMENT: HCPCS | Performed by: INTERNAL MEDICINE

## 2023-04-18 PROCEDURE — 1036F TOBACCO NON-USER: CPT | Performed by: INTERNAL MEDICINE

## 2023-04-18 PROCEDURE — 99214 OFFICE O/P EST MOD 30 MIN: CPT | Performed by: INTERNAL MEDICINE

## 2023-04-18 PROCEDURE — G8427 DOCREV CUR MEDS BY ELIG CLIN: HCPCS | Performed by: INTERNAL MEDICINE

## 2023-04-18 PROCEDURE — 1123F ACP DISCUSS/DSCN MKR DOCD: CPT | Performed by: INTERNAL MEDICINE

## 2023-04-18 NOTE — PROGRESS NOTES
morbidity and mortality related to the patient's current medical conditions are: Intermediate. Approximately 40 minutes were spent during prep work, discussion and exam of the patient, and follow up documentation and all of their questions were answered. The documentation recorded by the scribe, accurately and completely reflects the services I personally performed and the decisions made by me. Renan Crum MD, F.A.C.C.  April 18, 2023

## 2023-04-18 NOTE — PATIENT INSTRUCTIONS
SURVEY:    You may be receiving a survey from [employer] regarding your visit today. Please complete the survey to enable us to provide the highest quality of care to you and your family. If you cannot score us a very good on any question, please call the office to discuss how we could have made your experience a very good one. Thank you.

## 2023-04-19 ENCOUNTER — TELEPHONE (OUTPATIENT)
Dept: CARDIOLOGY | Age: 78
End: 2023-04-19

## 2023-04-19 NOTE — TELEPHONE ENCOUNTER
----- Message from Jorge Luis Wilder PA-C sent at 4/19/2023  2:58 PM EDT -----  Please let them know that their CAM monitor was overall unremarkable. We will discuss at their follow up appointment.

## 2023-11-05 DIAGNOSIS — B37.2 CUTANEOUS CANDIDIASIS: ICD-10-CM

## 2023-11-06 RX ORDER — CLOTRIMAZOLE AND BETAMETHASONE DIPROPIONATE 10; .64 MG/G; MG/G
CREAM TOPICAL
Qty: 45 G | Refills: 1 | OUTPATIENT
Start: 2023-11-06

## 2024-02-09 RX ORDER — ATORVASTATIN CALCIUM 80 MG/1
80 TABLET, FILM COATED ORAL DAILY
Qty: 90 TABLET | Refills: 3 | Status: SHIPPED | OUTPATIENT
Start: 2024-02-09

## 2024-02-20 RX ORDER — LOSARTAN POTASSIUM 50 MG/1
50 TABLET ORAL DAILY
Qty: 90 TABLET | Refills: 3 | Status: SHIPPED | OUTPATIENT
Start: 2024-02-20

## 2025-06-30 ENCOUNTER — HOSPITAL ENCOUNTER (INPATIENT)
Age: 80
LOS: 9 days | Discharge: NURSING, REHAB OR LONG-TERM CARE | DRG: 853 | End: 2025-07-10
Attending: EMERGENCY MEDICINE | Admitting: STUDENT IN AN ORGANIZED HEALTH CARE EDUCATION/TRAINING PROGRAM
Payer: MEDICARE

## 2025-06-30 ENCOUNTER — APPOINTMENT (OUTPATIENT)
Dept: CT IMAGING | Age: 80
End: 2025-06-30
Payer: MEDICARE

## 2025-06-30 ENCOUNTER — APPOINTMENT (OUTPATIENT)
Dept: GENERAL RADIOLOGY | Age: 80
End: 2025-06-30
Payer: MEDICARE

## 2025-06-30 ENCOUNTER — HOSPITAL ENCOUNTER (EMERGENCY)
Age: 80
Discharge: ANOTHER HOSPITAL | End: 2025-06-30
Payer: MEDICARE

## 2025-06-30 VITALS
SYSTOLIC BLOOD PRESSURE: 141 MMHG | HEART RATE: 84 BPM | DIASTOLIC BLOOD PRESSURE: 61 MMHG | RESPIRATION RATE: 21 BRPM | TEMPERATURE: 98.3 F | OXYGEN SATURATION: 93 %

## 2025-06-30 DIAGNOSIS — K80.20 SYMPTOMATIC CHOLELITHIASIS: ICD-10-CM

## 2025-06-30 DIAGNOSIS — G06.2 EPIDURAL ABSCESS: Primary | ICD-10-CM

## 2025-06-30 DIAGNOSIS — G06.1 SPINAL EPIDURAL ABSCESS: Primary | ICD-10-CM

## 2025-06-30 DIAGNOSIS — M46.20 ACUTE OSTEOMYELITIS OF SPINE (HCC): ICD-10-CM

## 2025-06-30 DIAGNOSIS — M46.20 VERTEBRAL OSTEOMYELITIS (HCC): ICD-10-CM

## 2025-06-30 DIAGNOSIS — M46.40 DISCITIS, UNSPECIFIED SPINAL REGION: ICD-10-CM

## 2025-06-30 DIAGNOSIS — R78.81 BACTEREMIA: ICD-10-CM

## 2025-06-30 DIAGNOSIS — R74.01 TRANSAMINITIS: ICD-10-CM

## 2025-06-30 LAB
ALBUMIN SERPL-MCNC: 3.4 G/DL (ref 3.5–5.2)
ALBUMIN/GLOB SERPL: 0.8 {RATIO} (ref 1–2.5)
ALP SERPL-CCNC: 382 U/L (ref 35–104)
ALT SERPL-CCNC: 487 U/L (ref 10–35)
AMORPH SED URNS QL MICRO: ABNORMAL
ANION GAP SERPL CALCULATED.3IONS-SCNC: 16 MMOL/L (ref 9–16)
AST SERPL-CCNC: 618 U/L (ref 10–35)
BACTERIA URNS QL MICRO: ABNORMAL
BASOPHILS # BLD: 0.15 K/UL (ref 0–0.2)
BASOPHILS NFR BLD: 1 % (ref 0–2)
BILIRUB SERPL-MCNC: 1.7 MG/DL (ref 0–1.2)
BILIRUB UR QL STRIP: NEGATIVE
BUN SERPL-MCNC: 19 MG/DL (ref 8–23)
BUN/CREAT SERPL: 21 (ref 9–20)
CALCIUM SERPL-MCNC: 9.8 MG/DL (ref 8.6–10.4)
CHLORIDE SERPL-SCNC: 98 MMOL/L (ref 98–107)
CLARITY UR: CLEAR
CO2 SERPL-SCNC: 21 MMOL/L (ref 20–31)
COLOR UR: YELLOW
CREAT SERPL-MCNC: 0.9 MG/DL (ref 0.5–0.9)
EKG ATRIAL RATE: 80 BPM
EKG P AXIS: 38 DEGREES
EKG P-R INTERVAL: 136 MS
EKG Q-T INTERVAL: 368 MS
EKG QRS DURATION: 88 MS
EKG QTC CALCULATION (BAZETT): 424 MS
EKG R AXIS: -36 DEGREES
EKG T AXIS: -5 DEGREES
EKG VENTRICULAR RATE: 80 BPM
EOSINOPHIL # BLD: 0 K/UL (ref 0–0.44)
EOSINOPHILS RELATIVE PERCENT: 0 % (ref 1–4)
EPI CELLS #/AREA URNS HPF: ABNORMAL /HPF (ref 0–25)
ERYTHROCYTE [DISTWIDTH] IN BLOOD BY AUTOMATED COUNT: 13.1 % (ref 11.8–14.4)
GFR, ESTIMATED: 62 ML/MIN/1.73M2
GLUCOSE SERPL-MCNC: 163 MG/DL (ref 74–99)
GLUCOSE UR STRIP-MCNC: NEGATIVE MG/DL
HCT VFR BLD AUTO: 35.8 % (ref 36.3–47.1)
HGB BLD-MCNC: 12.3 G/DL (ref 11.9–15.1)
HGB UR QL STRIP.AUTO: ABNORMAL
IMM GRANULOCYTES # BLD AUTO: 0.44 K/UL (ref 0–0.3)
IMM GRANULOCYTES NFR BLD: 3 %
KETONES UR STRIP-MCNC: NEGATIVE MG/DL
LACTATE BLDV-SCNC: 2.1 MMOL/L (ref 0.5–2.2)
LEUKOCYTE ESTERASE UR QL STRIP: NEGATIVE
LIPASE SERPL-CCNC: 52 U/L (ref 13–60)
LYMPHOCYTES NFR BLD: 0.73 K/UL (ref 1.1–3.7)
LYMPHOCYTES RELATIVE PERCENT: 5 % (ref 24–43)
MCH RBC QN AUTO: 32.1 PG (ref 25.2–33.5)
MCHC RBC AUTO-ENTMCNC: 34.4 G/DL (ref 28.4–34.8)
MCV RBC AUTO: 93.5 FL (ref 82.6–102.9)
MONOCYTES NFR BLD: 1.31 K/UL (ref 0.1–1.2)
MONOCYTES NFR BLD: 9 % (ref 3–12)
MORPHOLOGY: NORMAL
NEUTROPHILS NFR BLD: 82 % (ref 36–65)
NEUTS SEG NFR BLD: 11.97 K/UL (ref 1.5–8.1)
NITRITE UR QL STRIP: POSITIVE
NRBC BLD-RTO: 0 PER 100 WBC
PH UR STRIP: 6 [PH] (ref 5–9)
PLATELET # BLD AUTO: 294 K/UL (ref 138–453)
PMV BLD AUTO: 9.3 FL (ref 8.1–13.5)
POTASSIUM SERPL-SCNC: 3.7 MMOL/L (ref 3.7–5.3)
PROT SERPL-MCNC: 7.7 G/DL (ref 6.6–8.7)
PROT UR STRIP-MCNC: ABNORMAL MG/DL
RBC # BLD AUTO: 3.83 M/UL (ref 3.95–5.11)
RBC #/AREA URNS HPF: ABNORMAL /HPF (ref 0–2)
SODIUM SERPL-SCNC: 135 MMOL/L (ref 136–145)
SP GR UR STRIP: 1.01 (ref 1.01–1.02)
TROPONIN I SERPL HS-MCNC: 9 NG/L (ref 0–14)
UROBILINOGEN UR STRIP-ACNC: ABNORMAL EU/DL (ref 0–1)
WBC #/AREA URNS HPF: ABNORMAL /HPF (ref 0–5)
WBC OTHER # BLD: 14.6 K/UL (ref 3.5–11.3)

## 2025-06-30 PROCEDURE — 96367 TX/PROPH/DG ADDL SEQ IV INF: CPT

## 2025-06-30 PROCEDURE — 70450 CT HEAD/BRAIN W/O DYE: CPT

## 2025-06-30 PROCEDURE — 93010 ELECTROCARDIOGRAM REPORT: CPT | Performed by: INTERNAL MEDICINE

## 2025-06-30 PROCEDURE — 84484 ASSAY OF TROPONIN QUANT: CPT

## 2025-06-30 PROCEDURE — 96376 TX/PRO/DX INJ SAME DRUG ADON: CPT

## 2025-06-30 PROCEDURE — 87040 BLOOD CULTURE FOR BACTERIA: CPT

## 2025-06-30 PROCEDURE — 87205 SMEAR GRAM STAIN: CPT

## 2025-06-30 PROCEDURE — 6360000004 HC RX CONTRAST MEDICATION: Performed by: PHYSICIAN ASSISTANT

## 2025-06-30 PROCEDURE — 87154 CUL TYP ID BLD PTHGN 6+ TRGT: CPT

## 2025-06-30 PROCEDURE — 85025 COMPLETE CBC W/AUTO DIFF WBC: CPT

## 2025-06-30 PROCEDURE — 6360000002 HC RX W HCPCS: Performed by: PHYSICIAN ASSISTANT

## 2025-06-30 PROCEDURE — 83690 ASSAY OF LIPASE: CPT

## 2025-06-30 PROCEDURE — 93005 ELECTROCARDIOGRAM TRACING: CPT | Performed by: PHYSICIAN ASSISTANT

## 2025-06-30 PROCEDURE — 99221 1ST HOSP IP/OBS SF/LOW 40: CPT | Performed by: REGISTERED NURSE

## 2025-06-30 PROCEDURE — 87186 SC STD MICRODIL/AGAR DIL: CPT

## 2025-06-30 PROCEDURE — 96365 THER/PROPH/DIAG IV INF INIT: CPT

## 2025-06-30 PROCEDURE — 73030 X-RAY EXAM OF SHOULDER: CPT

## 2025-06-30 PROCEDURE — 70498 CT ANGIOGRAPHY NECK: CPT

## 2025-06-30 PROCEDURE — 83605 ASSAY OF LACTIC ACID: CPT

## 2025-06-30 PROCEDURE — 72132 CT LUMBAR SPINE W/DYE: CPT

## 2025-06-30 PROCEDURE — 96375 TX/PRO/DX INJ NEW DRUG ADDON: CPT

## 2025-06-30 PROCEDURE — 71045 X-RAY EXAM CHEST 1 VIEW: CPT

## 2025-06-30 PROCEDURE — 99285 EMERGENCY DEPT VISIT HI MDM: CPT

## 2025-06-30 PROCEDURE — 36415 COLL VENOUS BLD VENIPUNCTURE: CPT

## 2025-06-30 PROCEDURE — 2580000003 HC RX 258: Performed by: PHYSICIAN ASSISTANT

## 2025-06-30 PROCEDURE — 86403 PARTICLE AGGLUT ANTBDY SCRN: CPT

## 2025-06-30 PROCEDURE — 2500000003 HC RX 250 WO HCPCS: Performed by: PHYSICIAN ASSISTANT

## 2025-06-30 PROCEDURE — 81001 URINALYSIS AUTO W/SCOPE: CPT

## 2025-06-30 PROCEDURE — 80053 COMPREHEN METABOLIC PANEL: CPT

## 2025-06-30 PROCEDURE — 74177 CT ABD & PELVIS W/CONTRAST: CPT

## 2025-06-30 RX ORDER — MORPHINE SULFATE 2 MG/ML
2 INJECTION, SOLUTION INTRAMUSCULAR; INTRAVENOUS ONCE
Refills: 0 | Status: COMPLETED | OUTPATIENT
Start: 2025-06-30 | End: 2025-06-30

## 2025-06-30 RX ORDER — IOPAMIDOL 755 MG/ML
100 INJECTION, SOLUTION INTRAVASCULAR
Status: COMPLETED | OUTPATIENT
Start: 2025-06-30 | End: 2025-06-30

## 2025-06-30 RX ORDER — VANCOMYCIN 1 G/200ML
1000 INJECTION, SOLUTION INTRAVENOUS ONCE
Status: COMPLETED | OUTPATIENT
Start: 2025-06-30 | End: 2025-06-30

## 2025-06-30 RX ORDER — ONDANSETRON 2 MG/ML
4 INJECTION INTRAMUSCULAR; INTRAVENOUS ONCE
Status: COMPLETED | OUTPATIENT
Start: 2025-06-30 | End: 2025-06-30

## 2025-06-30 RX ADMIN — MORPHINE SULFATE 2 MG: 2 INJECTION, SOLUTION INTRAMUSCULAR; INTRAVENOUS at 21:20

## 2025-06-30 RX ADMIN — VANCOMYCIN 1000 MG: 1 INJECTION, SOLUTION INTRAVENOUS at 18:23

## 2025-06-30 RX ADMIN — ONDANSETRON 4 MG: 2 INJECTION, SOLUTION INTRAMUSCULAR; INTRAVENOUS at 17:42

## 2025-06-30 RX ADMIN — CEFTRIAXONE 1000 MG: 1 INJECTION, POWDER, FOR SOLUTION INTRAMUSCULAR; INTRAVENOUS at 12:59

## 2025-06-30 RX ADMIN — PIPERACILLIN AND TAZOBACTAM 3375 MG: 3; .375 INJECTION, POWDER, LYOPHILIZED, FOR SOLUTION INTRAVENOUS at 17:50

## 2025-06-30 RX ADMIN — IOPAMIDOL 100 ML: 755 INJECTION, SOLUTION INTRAVENOUS at 15:04

## 2025-06-30 RX ADMIN — MORPHINE SULFATE 2 MG: 2 INJECTION, SOLUTION INTRAMUSCULAR; INTRAVENOUS at 17:42

## 2025-06-30 ASSESSMENT — PAIN SCALES - GENERAL
PAINLEVEL_OUTOF10: 7
PAINLEVEL_OUTOF10: 5
PAINLEVEL_OUTOF10: 6
PAINLEVEL_OUTOF10: 5
PAINLEVEL_OUTOF10: 7
PAINLEVEL_OUTOF10: 10

## 2025-06-30 ASSESSMENT — LIFESTYLE VARIABLES
HOW MANY STANDARD DRINKS CONTAINING ALCOHOL DO YOU HAVE ON A TYPICAL DAY: PATIENT DOES NOT DRINK
HOW OFTEN DO YOU HAVE A DRINK CONTAINING ALCOHOL: NEVER
HOW OFTEN DO YOU HAVE A DRINK CONTAINING ALCOHOL: NEVER
HOW MANY STANDARD DRINKS CONTAINING ALCOHOL DO YOU HAVE ON A TYPICAL DAY: PATIENT DOES NOT DRINK

## 2025-06-30 ASSESSMENT — PAIN - FUNCTIONAL ASSESSMENT
PAIN_FUNCTIONAL_ASSESSMENT: 0-10
PAIN_FUNCTIONAL_ASSESSMENT: 0-10

## 2025-06-30 ASSESSMENT — PAIN DESCRIPTION - DESCRIPTORS: DESCRIPTORS: DISCOMFORT

## 2025-06-30 ASSESSMENT — PAIN DESCRIPTION - LOCATION: LOCATION: BACK

## 2025-06-30 NOTE — ED PROVIDER NOTES
Emergency Department Encounter    Note to patient: The 21st Century Cures Act requires that medical notes like this one to be available to patients in the interest of transparency. Please be advised, this is a medical document. It is intended as ygvl-sn-impv communication. It is written in medical language and may contain abbreviations and verbiage that may be unfamiliar. It may appear to read blunt or direct. Medical documents are intended to carry relevant medical information, facts as evident and the clinical opinion of the practitioner.      Chief Complaint  Chief Complaint   Patient presents with    Back Pain     Ongoing since last Tuesday. No known injuries, patient states that she \"just woke up and couldn't move\".     Altered Mental Status     Patient states that she thinks she has \"urinary tract psychosis\" because she has thought that she has heard her daughters voice saying things around the house when she isn't there and has been \"dizzy\" with movement. Patient denies any dysuria.         HPI  80-year-old female presents to the emergency department with 2 days of confusion and disorientation and a 1 week history of pain in her low back and her extremities.  She presents with her daughter who is her primary caregiver at home.  She states that she 1 week ago today was out chopping wood and carrying groceries and then that night started to have pain on the left side of her low back going into her left leg, this hurt her all the next day and then the following day which is 5 days ago she was seen in another emergency department had x-rays of her lumbar spine which were negative was given Tylenol and discharged home.  She has had difficulty getting up since that time she has just been either sitting in a chair and urinated on herself or been lying in bed.  Her daughter states that 2 days ago she fell out of bed landing on her left side since that time she has had difficulty lifting her left arm and had some

## 2025-06-30 NOTE — ED NOTES
Per Angelic, , at [facility], a BLS crew will be coming to help the [facility] crew be able to make all the transports happen. Also advised that [facility] may be coming to  pt, [facility] will confirm if so.

## 2025-07-01 ENCOUNTER — APPOINTMENT (OUTPATIENT)
Dept: ULTRASOUND IMAGING | Age: 80
DRG: 853 | End: 2025-07-01
Payer: MEDICARE

## 2025-07-01 ENCOUNTER — APPOINTMENT (OUTPATIENT)
Dept: GENERAL RADIOLOGY | Age: 80
DRG: 853 | End: 2025-07-01
Payer: MEDICARE

## 2025-07-01 ENCOUNTER — APPOINTMENT (OUTPATIENT)
Dept: MRI IMAGING | Age: 80
DRG: 853 | End: 2025-07-01
Payer: MEDICARE

## 2025-07-01 ENCOUNTER — RESULTS FOLLOW-UP (OUTPATIENT)
Dept: EMERGENCY DEPT | Age: 80
End: 2025-07-01

## 2025-07-01 PROBLEM — R78.81 BACTEREMIA DUE TO METHICILLIN SUSCEPTIBLE STAPHYLOCOCCUS AUREUS (MSSA): Status: ACTIVE | Noted: 2025-07-01

## 2025-07-01 PROBLEM — G06.2 EPIDURAL ABSCESS: Status: ACTIVE | Noted: 2025-07-01

## 2025-07-01 PROBLEM — B95.61 BACTEREMIA DUE TO METHICILLIN SUSCEPTIBLE STAPHYLOCOCCUS AUREUS (MSSA): Status: ACTIVE | Noted: 2025-07-01

## 2025-07-01 PROBLEM — B49 FUNGEMIA: Status: ACTIVE | Noted: 2025-07-01

## 2025-07-01 PROBLEM — M46.20 VERTEBRAL OSTEOMYELITIS (HCC): Status: ACTIVE | Noted: 2025-07-01

## 2025-07-01 LAB
ALBUMIN SERPL-MCNC: 2.9 G/DL (ref 3.5–5.2)
ALBUMIN SERPL-MCNC: 3 G/DL (ref 3.5–5.2)
ALBUMIN/GLOB SERPL: 0.7 {RATIO} (ref 1–2.5)
ALBUMIN/GLOB SERPL: 0.7 {RATIO} (ref 1–2.5)
ALP SERPL-CCNC: 388 U/L (ref 35–104)
ALP SERPL-CCNC: 421 U/L (ref 35–104)
ALT SERPL-CCNC: 433 U/L (ref 10–35)
ALT SERPL-CCNC: 436 U/L (ref 10–35)
ANION GAP SERPL CALCULATED.3IONS-SCNC: 13 MMOL/L (ref 9–16)
ANION GAP SERPL CALCULATED.3IONS-SCNC: 13 MMOL/L (ref 9–16)
AST SERPL-CCNC: 430 U/L (ref 10–35)
AST SERPL-CCNC: 467 U/L (ref 10–35)
BASOPHILS # BLD: 0 K/UL (ref 0–0.2)
BASOPHILS # BLD: 0.2 K/UL (ref 0–0.2)
BASOPHILS NFR BLD: 0 % (ref 0–2)
BASOPHILS NFR BLD: 1 % (ref 0–2)
BILIRUB DIRECT SERPL-MCNC: 1.1 MG/DL (ref 0–0.2)
BILIRUB INDIRECT SERPL-MCNC: 0.4 MG/DL (ref 0–1)
BILIRUB SERPL-MCNC: 1.5 MG/DL (ref 0–1.2)
BILIRUB SERPL-MCNC: 1.5 MG/DL (ref 0–1.2)
BUN SERPL-MCNC: 22 MG/DL (ref 8–23)
BUN SERPL-MCNC: 22 MG/DL (ref 8–23)
CALCIUM SERPL-MCNC: 9.1 MG/DL (ref 8.6–10.4)
CALCIUM SERPL-MCNC: 9.4 MG/DL (ref 8.6–10.4)
CHLORIDE SERPL-SCNC: 100 MMOL/L (ref 98–107)
CHLORIDE SERPL-SCNC: 103 MMOL/L (ref 98–107)
CHP ED QC CHECK: YES
CO2 SERPL-SCNC: 21 MMOL/L (ref 20–31)
CO2 SERPL-SCNC: 21 MMOL/L (ref 20–31)
CREAT SERPL-MCNC: 0.8 MG/DL (ref 0.6–0.9)
CREAT SERPL-MCNC: 1 MG/DL (ref 0.6–0.9)
CRP SERPL HS-MCNC: 389 MG/L (ref 0–5)
EOSINOPHIL # BLD: 0 K/UL (ref 0–0.4)
EOSINOPHIL # BLD: 0 K/UL (ref 0–0.44)
EOSINOPHILS RELATIVE PERCENT: 0 % (ref 1–4)
EOSINOPHILS RELATIVE PERCENT: 0 % (ref 1–4)
ERYTHROCYTE [DISTWIDTH] IN BLOOD BY AUTOMATED COUNT: 13.2 % (ref 11.8–14.4)
ERYTHROCYTE [DISTWIDTH] IN BLOOD BY AUTOMATED COUNT: 13.4 % (ref 11.8–14.4)
ERYTHROCYTE [SEDIMENTATION RATE] IN BLOOD BY PHOTOMETRIC METHOD: >130 MM/HR (ref 0–30)
GFR, ESTIMATED: 58 ML/MIN/1.73M2
GFR, ESTIMATED: 75 ML/MIN/1.73M2
GLOBULIN SER CALC-MCNC: 4.4 G/DL
GLUCOSE BLD-MCNC: 106 MG/DL (ref 65–105)
GLUCOSE BLD-MCNC: 121 MG/DL (ref 65–105)
GLUCOSE BLD-MCNC: 139 MG/DL
GLUCOSE BLD-MCNC: 139 MG/DL (ref 65–105)
GLUCOSE BLD-MCNC: 140 MG/DL (ref 65–105)
GLUCOSE SERPL-MCNC: 128 MG/DL (ref 74–99)
GLUCOSE SERPL-MCNC: 148 MG/DL (ref 74–99)
HAV IGM SERPL QL IA: NONREACTIVE
HBV CORE IGM SERPL QL IA: NONREACTIVE
HBV SURFACE AG SERPL QL IA: NONREACTIVE
HCT VFR BLD AUTO: 32.8 % (ref 36.3–47.1)
HCT VFR BLD AUTO: 33.6 % (ref 36.3–47.1)
HCV AB SERPL QL IA: NONREACTIVE
HGB BLD-MCNC: 11.2 G/DL (ref 11.9–15.1)
HGB BLD-MCNC: 11.2 G/DL (ref 11.9–15.1)
IMM GRANULOCYTES # BLD AUTO: 0.19 K/UL (ref 0–0.3)
IMM GRANULOCYTES # BLD AUTO: 0.8 K/UL (ref 0–0.3)
IMM GRANULOCYTES NFR BLD: 1 %
IMM GRANULOCYTES NFR BLD: 4 %
INR PPP: 1.2
LYMPHOCYTES NFR BLD: 0.76 K/UL (ref 1–4.8)
LYMPHOCYTES NFR BLD: 0.8 K/UL (ref 1.1–3.7)
LYMPHOCYTES RELATIVE PERCENT: 4 % (ref 24–43)
LYMPHOCYTES RELATIVE PERCENT: 4 % (ref 24–44)
MCH RBC QN AUTO: 31.6 PG (ref 25.2–33.5)
MCH RBC QN AUTO: 31.7 PG (ref 25.2–33.5)
MCHC RBC AUTO-ENTMCNC: 33.3 G/DL (ref 28.4–34.8)
MCHC RBC AUTO-ENTMCNC: 34.1 G/DL (ref 28.4–34.8)
MCV RBC AUTO: 92.7 FL (ref 82.6–102.9)
MCV RBC AUTO: 95.2 FL (ref 82.6–102.9)
MONOCYTES NFR BLD: 1.19 K/UL (ref 0.1–1.2)
MONOCYTES NFR BLD: 1.32 K/UL (ref 0.1–0.8)
MONOCYTES NFR BLD: 6 % (ref 3–12)
MONOCYTES NFR BLD: 7 % (ref 1–7)
MORPHOLOGY: NORMAL
MORPHOLOGY: NORMAL
NEUTROPHILS NFR BLD: 85 % (ref 36–65)
NEUTROPHILS NFR BLD: 88 % (ref 36–66)
NEUTS SEG NFR BLD: 16.63 K/UL (ref 1.8–7.7)
NEUTS SEG NFR BLD: 16.91 K/UL (ref 1.5–8.1)
NRBC BLD-RTO: 0 PER 100 WBC
NRBC BLD-RTO: 0 PER 100 WBC
PLATELET # BLD AUTO: 331 K/UL (ref 138–453)
PLATELET # BLD AUTO: 347 K/UL (ref 138–453)
PMV BLD AUTO: 9 FL (ref 8.1–13.5)
PMV BLD AUTO: 9.4 FL (ref 8.1–13.5)
POTASSIUM SERPL-SCNC: 3.9 MMOL/L (ref 3.7–5.3)
POTASSIUM SERPL-SCNC: 4.3 MMOL/L (ref 3.7–5.3)
PROT SERPL-MCNC: 7.3 G/DL (ref 6.6–8.7)
PROT SERPL-MCNC: 7.4 G/DL (ref 6.6–8.7)
PROTHROMBIN TIME: 15.1 SEC (ref 11.7–14.9)
RBC # BLD AUTO: 3.53 M/UL (ref 3.95–5.11)
RBC # BLD AUTO: 3.54 M/UL (ref 3.95–5.11)
SODIUM SERPL-SCNC: 134 MMOL/L (ref 136–145)
SODIUM SERPL-SCNC: 137 MMOL/L (ref 136–145)
WBC OTHER # BLD: 18.9 K/UL (ref 3.5–11.3)
WBC OTHER # BLD: 19.9 K/UL (ref 3.5–11.3)

## 2025-07-01 PROCEDURE — 85652 RBC SED RATE AUTOMATED: CPT

## 2025-07-01 PROCEDURE — 85025 COMPLETE CBC W/AUTO DIFF WBC: CPT

## 2025-07-01 PROCEDURE — 72156 MRI NECK SPINE W/O & W/DYE: CPT

## 2025-07-01 PROCEDURE — 80048 BASIC METABOLIC PNL TOTAL CA: CPT

## 2025-07-01 PROCEDURE — 99223 1ST HOSP IP/OBS HIGH 75: CPT | Performed by: HOSPITALIST

## 2025-07-01 PROCEDURE — A9576 INJ PROHANCE MULTIPACK: HCPCS | Performed by: REGISTERED NURSE

## 2025-07-01 PROCEDURE — 6370000000 HC RX 637 (ALT 250 FOR IP)

## 2025-07-01 PROCEDURE — 6370000000 HC RX 637 (ALT 250 FOR IP): Performed by: HOSPITALIST

## 2025-07-01 PROCEDURE — 73060 X-RAY EXAM OF HUMERUS: CPT

## 2025-07-01 PROCEDURE — 2580000003 HC RX 258: Performed by: NURSE PRACTITIONER

## 2025-07-01 PROCEDURE — 6360000002 HC RX W HCPCS: Performed by: NURSE PRACTITIONER

## 2025-07-01 PROCEDURE — 87154 CUL TYP ID BLD PTHGN 6+ TRGT: CPT

## 2025-07-01 PROCEDURE — 86376 MICROSOMAL ANTIBODY EACH: CPT

## 2025-07-01 PROCEDURE — 80053 COMPREHEN METABOLIC PANEL: CPT

## 2025-07-01 PROCEDURE — 99223 1ST HOSP IP/OBS HIGH 75: CPT | Performed by: INTERNAL MEDICINE

## 2025-07-01 PROCEDURE — 86038 ANTINUCLEAR ANTIBODIES: CPT

## 2025-07-01 PROCEDURE — 87186 SC STD MICRODIL/AGAR DIL: CPT

## 2025-07-01 PROCEDURE — 80074 ACUTE HEPATITIS PANEL: CPT

## 2025-07-01 PROCEDURE — 6360000004 HC RX CONTRAST MEDICATION: Performed by: REGISTERED NURSE

## 2025-07-01 PROCEDURE — 76705 ECHO EXAM OF ABDOMEN: CPT

## 2025-07-01 PROCEDURE — 82784 ASSAY IGA/IGD/IGG/IGM EACH: CPT

## 2025-07-01 PROCEDURE — 1200000000 HC SEMI PRIVATE

## 2025-07-01 PROCEDURE — 6360000002 HC RX W HCPCS: Performed by: INTERNAL MEDICINE

## 2025-07-01 PROCEDURE — 72157 MRI CHEST SPINE W/O & W/DYE: CPT

## 2025-07-01 PROCEDURE — 87040 BLOOD CULTURE FOR BACTERIA: CPT

## 2025-07-01 PROCEDURE — 86403 PARTICLE AGGLUT ANTBDY SCRN: CPT

## 2025-07-01 PROCEDURE — 82947 ASSAY GLUCOSE BLOOD QUANT: CPT

## 2025-07-01 PROCEDURE — 86140 C-REACTIVE PROTEIN: CPT

## 2025-07-01 PROCEDURE — 85610 PROTHROMBIN TIME: CPT

## 2025-07-01 PROCEDURE — 2500000003 HC RX 250 WO HCPCS: Performed by: REGISTERED NURSE

## 2025-07-01 PROCEDURE — 83516 IMMUNOASSAY NONANTIBODY: CPT

## 2025-07-01 PROCEDURE — 87205 SMEAR GRAM STAIN: CPT

## 2025-07-01 PROCEDURE — 6370000000 HC RX 637 (ALT 250 FOR IP): Performed by: NURSE PRACTITIONER

## 2025-07-01 PROCEDURE — 36415 COLL VENOUS BLD VENIPUNCTURE: CPT

## 2025-07-01 PROCEDURE — 72158 MRI LUMBAR SPINE W/O & W/DYE: CPT

## 2025-07-01 PROCEDURE — 86225 DNA ANTIBODY NATIVE: CPT

## 2025-07-01 PROCEDURE — 80076 HEPATIC FUNCTION PANEL: CPT

## 2025-07-01 PROCEDURE — 82105 ALPHA-FETOPROTEIN SERUM: CPT

## 2025-07-01 PROCEDURE — 2580000003 HC RX 258: Performed by: INTERNAL MEDICINE

## 2025-07-01 RX ORDER — SODIUM CHLORIDE 0.9 % (FLUSH) 0.9 %
10 SYRINGE (ML) INJECTION PRN
Status: DISCONTINUED | OUTPATIENT
Start: 2025-07-01 | End: 2025-07-10 | Stop reason: HOSPADM

## 2025-07-01 RX ORDER — MAGNESIUM SULFATE 1 G/100ML
1000 INJECTION INTRAVENOUS PRN
Status: DISCONTINUED | OUTPATIENT
Start: 2025-07-01 | End: 2025-07-10 | Stop reason: HOSPADM

## 2025-07-01 RX ORDER — ATORVASTATIN CALCIUM 80 MG/1
80 TABLET, FILM COATED ORAL DAILY
Status: DISCONTINUED | OUTPATIENT
Start: 2025-07-01 | End: 2025-07-08

## 2025-07-01 RX ORDER — LIDOCAINE HYDROCHLORIDE 20 MG/ML
JELLY TOPICAL PRN
Status: DISCONTINUED | OUTPATIENT
Start: 2025-07-01 | End: 2025-07-10 | Stop reason: HOSPADM

## 2025-07-01 RX ORDER — POLYETHYLENE GLYCOL 3350 17 G/17G
17 POWDER, FOR SOLUTION ORAL DAILY PRN
Status: DISCONTINUED | OUTPATIENT
Start: 2025-07-01 | End: 2025-07-10 | Stop reason: HOSPADM

## 2025-07-01 RX ORDER — DEXTROSE MONOHYDRATE 100 MG/ML
INJECTION, SOLUTION INTRAVENOUS CONTINUOUS PRN
Status: DISCONTINUED | OUTPATIENT
Start: 2025-07-01 | End: 2025-07-10 | Stop reason: HOSPADM

## 2025-07-01 RX ORDER — GADOTERIDOL 279.3 MG/ML
10 INJECTION INTRAVENOUS
Status: COMPLETED | OUTPATIENT
Start: 2025-07-01 | End: 2025-07-01

## 2025-07-01 RX ORDER — ONDANSETRON 2 MG/ML
4 INJECTION INTRAMUSCULAR; INTRAVENOUS EVERY 6 HOURS PRN
Status: DISCONTINUED | OUTPATIENT
Start: 2025-07-01 | End: 2025-07-10 | Stop reason: HOSPADM

## 2025-07-01 RX ORDER — LIDOCAINE 4 G/G
1 PATCH TOPICAL DAILY
Status: DISCONTINUED | OUTPATIENT
Start: 2025-07-01 | End: 2025-07-10 | Stop reason: HOSPADM

## 2025-07-01 RX ORDER — SODIUM CHLORIDE 0.9 % (FLUSH) 0.9 %
5-40 SYRINGE (ML) INJECTION EVERY 12 HOURS SCHEDULED
Status: DISCONTINUED | OUTPATIENT
Start: 2025-07-01 | End: 2025-07-10 | Stop reason: HOSPADM

## 2025-07-01 RX ORDER — SODIUM CHLORIDE 9 MG/ML
INJECTION, SOLUTION INTRAVENOUS CONTINUOUS
Status: ACTIVE | OUTPATIENT
Start: 2025-07-01 | End: 2025-07-02

## 2025-07-01 RX ORDER — METOPROLOL SUCCINATE 25 MG/1
12.5 TABLET, EXTENDED RELEASE ORAL DAILY
Status: DISCONTINUED | OUTPATIENT
Start: 2025-07-01 | End: 2025-07-10 | Stop reason: HOSPADM

## 2025-07-01 RX ORDER — POTASSIUM CHLORIDE 7.45 MG/ML
10 INJECTION INTRAVENOUS PRN
Status: DISCONTINUED | OUTPATIENT
Start: 2025-07-01 | End: 2025-07-10 | Stop reason: HOSPADM

## 2025-07-01 RX ORDER — GLUCAGON 1 MG/ML
1 KIT INJECTION PRN
Status: DISCONTINUED | OUTPATIENT
Start: 2025-07-01 | End: 2025-07-10 | Stop reason: HOSPADM

## 2025-07-01 RX ORDER — LOSARTAN POTASSIUM 50 MG/1
50 TABLET ORAL DAILY
Status: DISCONTINUED | OUTPATIENT
Start: 2025-07-01 | End: 2025-07-08

## 2025-07-01 RX ORDER — SODIUM CHLORIDE 9 MG/ML
INJECTION, SOLUTION INTRAVENOUS PRN
Status: DISCONTINUED | OUTPATIENT
Start: 2025-07-01 | End: 2025-07-10 | Stop reason: HOSPADM

## 2025-07-01 RX ORDER — POTASSIUM CHLORIDE 1500 MG/1
40 TABLET, EXTENDED RELEASE ORAL PRN
Status: DISCONTINUED | OUTPATIENT
Start: 2025-07-01 | End: 2025-07-10 | Stop reason: HOSPADM

## 2025-07-01 RX ORDER — ONDANSETRON 4 MG/1
4 TABLET, ORALLY DISINTEGRATING ORAL EVERY 8 HOURS PRN
Status: DISCONTINUED | OUTPATIENT
Start: 2025-07-01 | End: 2025-07-10 | Stop reason: HOSPADM

## 2025-07-01 RX ORDER — INSULIN LISPRO 100 [IU]/ML
0-4 INJECTION, SOLUTION INTRAVENOUS; SUBCUTANEOUS
Status: DISCONTINUED | OUTPATIENT
Start: 2025-07-01 | End: 2025-07-10 | Stop reason: HOSPADM

## 2025-07-01 RX ORDER — MORPHINE SULFATE 2 MG/ML
2 INJECTION, SOLUTION INTRAMUSCULAR; INTRAVENOUS EVERY 4 HOURS PRN
Status: DISCONTINUED | OUTPATIENT
Start: 2025-07-01 | End: 2025-07-03

## 2025-07-01 RX ORDER — ENOXAPARIN SODIUM 100 MG/ML
40 INJECTION SUBCUTANEOUS DAILY
Status: DISCONTINUED | OUTPATIENT
Start: 2025-07-01 | End: 2025-07-10 | Stop reason: HOSPADM

## 2025-07-01 RX ADMIN — LOSARTAN POTASSIUM 50 MG: 50 TABLET, FILM COATED ORAL at 10:00

## 2025-07-01 RX ADMIN — SODIUM CHLORIDE, PRESERVATIVE FREE 10 ML: 5 INJECTION INTRAVENOUS at 09:15

## 2025-07-01 RX ADMIN — ATORVASTATIN CALCIUM 80 MG: 80 TABLET, FILM COATED ORAL at 13:00

## 2025-07-01 RX ADMIN — SERTRALINE 100 MG: 50 TABLET, FILM COATED ORAL at 14:39

## 2025-07-01 RX ADMIN — PIPERACILLIN AND TAZOBACTAM 3375 MG: 3; .375 INJECTION, POWDER, LYOPHILIZED, FOR SOLUTION INTRAVENOUS at 12:01

## 2025-07-01 RX ADMIN — NAFCILLIN SODIUM 2000 MG: 2 INJECTION, POWDER, LYOPHILIZED, FOR SOLUTION INTRAMUSCULAR; INTRAVENOUS at 22:06

## 2025-07-01 RX ADMIN — SODIUM CHLORIDE: 0.9 INJECTION, SOLUTION INTRAVENOUS at 23:12

## 2025-07-01 RX ADMIN — PIPERACILLIN AND TAZOBACTAM 3375 MG: 3; .375 INJECTION, POWDER, LYOPHILIZED, FOR SOLUTION INTRAVENOUS at 03:28

## 2025-07-01 RX ADMIN — METOPROLOL SUCCINATE 12.5 MG: 25 TABLET, FILM COATED, EXTENDED RELEASE ORAL at 14:37

## 2025-07-01 RX ADMIN — MORPHINE SULFATE 2 MG: 2 INJECTION, SOLUTION INTRAMUSCULAR; INTRAVENOUS at 22:51

## 2025-07-01 RX ADMIN — VANCOMYCIN HYDROCHLORIDE 500 MG: 500 INJECTION, POWDER, LYOPHILIZED, FOR SOLUTION INTRAVENOUS at 07:45

## 2025-07-01 RX ADMIN — VANCOMYCIN HYDROCHLORIDE 500 MG: 500 INJECTION, POWDER, LYOPHILIZED, FOR SOLUTION INTRAVENOUS at 20:18

## 2025-07-01 RX ADMIN — SODIUM CHLORIDE: 0.9 INJECTION, SOLUTION INTRAVENOUS at 03:20

## 2025-07-01 RX ADMIN — GADOTERIDOL 10 ML: 279.3 INJECTION, SOLUTION INTRAVENOUS at 09:13

## 2025-07-01 RX ADMIN — ENOXAPARIN SODIUM 40 MG: 100 INJECTION SUBCUTANEOUS at 11:15

## 2025-07-01 ASSESSMENT — PAIN SCALES - GENERAL
PAINLEVEL_OUTOF10: 5
PAINLEVEL_OUTOF10: 3

## 2025-07-01 ASSESSMENT — PAIN DESCRIPTION - DESCRIPTORS: DESCRIPTORS: ACHING;DISCOMFORT

## 2025-07-01 ASSESSMENT — PAIN DESCRIPTION - LOCATION
LOCATION: BACK;ARM
LOCATION: BACK

## 2025-07-01 ASSESSMENT — PAIN - FUNCTIONAL ASSESSMENT: PAIN_FUNCTIONAL_ASSESSMENT: PREVENTS OR INTERFERES SOME ACTIVE ACTIVITIES AND ADLS

## 2025-07-01 ASSESSMENT — PAIN DESCRIPTION - ORIENTATION: ORIENTATION: LEFT

## 2025-07-01 NOTE — ED PROVIDER NOTES
[facility] EMERGENCY DEPARTMENT     Emergency Department     Faculty Attestation        I performed a history and physical examination of the patient and discussed management with the resident. I reviewed the resident’s note and agree with the documented findings and plan of care. Any areas of disagreement are noted on the chart. I was personally present for the key portions of any procedures. I have documented in the chart those procedures where I was not present during the key portions. I have reviewed the emergency nurses triage note. I agree with the chief complaint, past medical history, past surgical history, allergies, medications, social and family history as documented unless otherwise noted below.    For mid-level providers such as nurse practitioners as well as physicians assistants:    I have personally seen and evaluated the patient.    I find the patient's history and physical exam are consistent with NP/PA documentation.  I agree with the care provided, treatment rendered, disposition, & follow-up plan.     Additional findings are as noted.    Vital Signs: /71   Pulse 76   Temp 98.9 °F (37.2 °C) (Oral)   Resp 22   Ht 1.6 m (5' 3\")   Wt 57.6 kg (127 lb)   LMP  (LMP Unknown)   SpO2 97%   BMI 22.50 kg/m²   PCP:  Camden Fields ND  Note Started: 11:00 PM EDT    Pertinent Comments:     Transferred from facility for concern for osteomyelitis and spinal epidural abscess.      Critical Care  None          David Harding MD    Attending Emergency Medicine Physician            Marcelo Harding MD  06/30/25 2300    
acute distress.  Consult placed to neurosurgery to evaluate.    ED Course as of 07/01/25 0024   Mon Jun 30, 2025   2352 Patient reporting some pain to the left upper arm, x-ray of the left shoulder negative at sending facility.  X-ray left humerus ordered.  Neurosurgery at bedside, MRI C/T/L-spine with and without IV contrast ordered by neurosurgery.  Recommending patient be admitted to medicine service, consult placed to [facility] service to evaluate for admission. [CH]   Tue Jul 01, 2025   0023 LFTs elevated at sending facility, previous labs from a few months ago with normal LFTs.  Patient was recently seen in ED and placed on Tylenol for back pain.  Will obtain repeat hepatic function panel to ensure not a lab error as well as had hep panel. [CH]   0023 Case discussed with [facility], agreed to admit. [CH]      ED Course User Index  [CH] Dejan Clark MD       PROCEDURES:      CONSULTS:  IP CONSULT TO NEUROSURGERY  IP CONSULT TO HOSPITALIST    CRITICAL CARE:  There was significant risk of life threatening deterioration of patient's condition requiring my direct management. Critical care time 0 minutes, excluding any documented procedures.    FINAL IMPRESSION      1. Epidural abscess    2. Vertebral osteomyelitis (HCC)    3. Transaminitis          DISPOSITION / PLAN     DISPOSITION Decision To Admit 06/30/2025 10:58:44 PM   DISPOSITION CONDITION Stable           PATIENT REFERRED TO:  No follow-up provider specified.    DISCHARGE MEDICATIONS:  New Prescriptions    No medications on file       Dejan Clark MD  Emergency Medicine Resident    (Please note that portions of thisnote were completed with a voice recognition program.  Efforts were made to edit the dictations but occasionally words are mis-transcribed.)

## 2025-07-01 NOTE — ED NOTES
MRI questionnaire completed with assistance from pt's daughter, Farrah. PT alert, RR even and unlabored. NAD. Pt provided ice water, judith crackers, and a popsicle, okay per Dr. Clark.    Right LE

## 2025-07-01 NOTE — ED NOTES
ED to inpatient nurses report      Chief Complaint:  Chief Complaint   Patient presents with   • Back Pain     Present to ED from: [place] transferred     MOA:     LOC: alert and orientated to name, place, date  Mobility: Requires assistance * 1  Oxygen Baseline: room air    Current needs required: none   Pending ED orders: none  Present condition: stale    Why did the patient come to the ED? Pt presented to ED via EMS transfer from [place].  Pt presents with C/O 80-year-old female with L2-L3 discitis, osteomyelitis and spinal epidural abscess visualized on CT  1 week of back pain, confused today  CT brain with no acute findings  CT L-spine with psoas abscess, 5.6 mm spinal epidural abscess and discitis/osteomyelitis  Moving all extremities, weakness in hip flexion.  Pt states Pain all over her body.  Pt has a hx of Falls this week, COPD, DM, and hyperlipidimia.  Pt is Alert and oriented. Pt is resting comfortably on stretcher with call light in reach.  No acute distress noted. Respirations are even and unlabored.   What is the plan? admit  Any procedures or intervention occur? Monitor, consult Neurosurgery  Any safety concerns?? Fall risk    Mental Status:       Psych Assessment:   Psychosocial  Psychosocial (WDL): Within Defined Limits  Vital signs   Vitals:    06/30/25 2243 06/30/25 2246 06/30/25 2247 06/30/25 2300   BP:   123/71 (!) 138/59   Pulse: 76   82   Resp: 22   13   Temp: 98.9 °F (37.2 °C)      TempSrc: Oral      SpO2:   97% 96%   Weight:  57.6 kg (127 lb)     Height:  1.6 m (5' 3\")          Vitals:  Patient Vitals for the past 24 hrs:   BP Temp Temp src Pulse Resp SpO2 Height Weight   06/30/25 2300 (!) 138/59 -- -- 82 13 96 % -- --   06/30/25 2247 123/71 -- -- -- -- 97 % -- --   06/30/25 2246 -- -- -- -- -- -- 1.6 m (5' 3\") 57.6 kg (127 lb)   06/30/25 2243 -- 98.9 °F (37.2 °C) Oral 76 22 -- -- --      Visit Vitals  BP (!) 138/59   Pulse 82   Temp 98.9 °F (37.2 °C) (Oral)   Resp 13   Ht 1.6 m (5' 3\")   Wt

## 2025-07-01 NOTE — ED TRIAGE NOTES
Pt presented to ED via EMS transfer from [place].  Pt presents with C/O 80-year-old female with L2-L3 discitis, osteomyelitis and spinal epidural abscess visualized on CT  1 week of back pain, confused today  CT brain with no acute findings  CT L-spine with psoas abscess, 5.6 mm spinal epidural abscess and discitis/osteomyelitis  Moving all extremities, weakness in hip flexion.  Pt states Pain all over her body.  Pt has a hx of Falls this week, COPD, DM, and hyperlipidimia.  Pt is Alert and oriented. Pt is resting comfortably on stretcher with call light in reach.  No acute distress noted. Respirations are even and unlabored.  White board updated. Will continue to follow plan of care.

## 2025-07-01 NOTE — ED NOTES
The following labs were labeled with appropriate pt sticker and tubed to lab:     [x] Blue     [x] Lavender   [] on ice  [x] Green/yellow  [] Green/black [] on ice  [] Clayton  [] on ice  [x] Yellow  [] Red  [] Pink  [] Type/ Screen  [] ABG  [] VBG    [] COVID-19 swab    [] Rapid  [] PCR  [] Flu swab  [] Peds Viral Panel     [] Urine Sample  [] Fecal Sample  [] Pelvic Cultures  [] Blood Cultures  [] X 2  [] STREP Cultures  [] Wound Cultures

## 2025-07-01 NOTE — CARE COORDINATION
Case Management Assessment  Initial Evaluation    Date/Time of Evaluation: 7/1/2025 2:41 PM  Assessment Completed by: KODI LEE RN    If patient is discharged prior to next notation, then this note serves as note for discharge by case management.    Patient Name: Urvashi Tse                   YOB: 1945  Diagnosis: Epidural abscess [G06.2]                   Date / Time: 6/30/2025 10:43 PM    Patient Admission Status: Inpatient   Readmission Risk (Low < 19, Mod (19-27), High > 27): Readmission Risk Score: 14.8    Current PCP: Camden Fields ND  PCP verified by CM? Yes    Chart Reviewed: Yes      History Provided by: Patient  Patient Orientation: Alert and Oriented    Patient Cognition: Alert    Hospitalization in the last 30 days (Readmission):  No    If yes, Readmission Assessment in CM Navigator will be completed.    Advance Directives:      Code Status: Full Code   Patient's Primary Decision Maker is:        Discharge Planning:    Patient lives with: Children Type of Home: House  Primary Care Giver:    Patient Support Systems include: Children   Current Financial resources:    Current community resources:    Current services prior to admission: None            Current DME:              Type of Home Care services:  None    ADLS  Prior functional level: Independent in ADLs/IADLs  Current functional level: Independent in ADLs/IADLs    PT AM-PAC:   /24  OT AM-PAC:   /24    Family can provide assistance at DC: Yes  Would you like Case Management to discuss the discharge plan with any other family members/significant others, and if so, who? No  Plans to Return to Present Housing: Yes  Other Identified Issues/Barriers to RETURNING to current housing: none  Potential Assistance needed at discharge: N/A            Potential DME:    Patient expects to discharge to: House  Plan for transportation at discharge:      Financial    Payor: MEDICARE / Plan: MEDICARE PART A AND B / Product Type: *No Product

## 2025-07-01 NOTE — H&P
outlined, requirement for current medical therapies and most importantly because of direct risk to patient if care not provided in a hospital setting.  Expected length of stay > 48 hours.    Brett Calero MD  7/1/2025  12:11 PM    Copy sent to Camden Collazo ND

## 2025-07-01 NOTE — CONSULTS
[facility]  Initial Consult Note  Date: 7/1/2025    Hospital day :0     Impression:   MSSA sepsis  Candida tropicalis fungemia  L2/3 discitis/osteomyelitis with associated epidural thickening/enhancement consistent with epidural abscess from the L1/2 disc level to L3/4 disc level and associated left psoas abscess  Diabetes mellitus type 2  Essential hypertension  Hyperlipidemia  COPD  Recent left lower extremity cellulitis    Recommendations   The patient has imaging consistent with discitis/osteomyelitis with associated epidural abscess/psoas abscess  The patient has MSSA sepsis which would explain the above findings  The patient also has fungemia and the etiology of this is not entirely clear  The patient will need workup for endocarditis with an echocardiogram  The inflammatory markers are very elevated and we will continue to monitor the trend  Neurosurgery part of the care team and will decide whether surgical intervention is warranted    Chief complaint/reason for consultation:     Question Answer   Reason for Consult? epidural abscess     History of Present Illness:   Urvashi Tse is a 80 y.o.-year-old female who was initially admitted on 6/30/2025.     Urvashi is a poor historian and the history is obtained from the patient and her daughter who are in the room.  Urvashi has diabetes mellitus type 2, hypertension, hyperlipidemia, COPD mitral valve regurgitation among other medical problems.     The daughter reports a recent hospitalization for left lower extremity cellulitis in late April 2025, and it would seem that the patient had a rash that she described as \"hives\" at that time as well. Initially she was treated with cephalexin for the cellulitis and it would seem that the cellulitis of the rash recurred at the same time.  The cephalexin did not help and the patient ended up being hospitalized received Rocephin and vancomycin and subsequently switched to oral doxycycline and

## 2025-07-01 NOTE — CONSULTS
Department of Neurosurgery                                            Nurse Practitioner Consult Note      Reason for Consult: Transfer, epidural abscess, discitis vertebral osteomyelitis  Requesting Physician: Eduardo  Neurosurgeon:   [] Dr. Arango  [x] Dr. Parkinson  [] Dr. Camarillo  [] Dr. Parker    Neurosurgery notified of consult at: 2300  Neurosurgery evaluation begun: 2315    History Obtained From:  patient, family member - daughter, electronic medical record    CHIEF COMPLAINT:         Chief Complaint   Patient presents with    Back Pain       HISTORY OF PRESENT ILLNESS:       The patient is a 80 y.o. female was transferred from [place] emergency department where she presented with 2 days of confusion and disorientation and 1 week history of pain in her low back and extremities.  Patient reports that on Monday evening she carried in her groceries and had chopped wood.  She reports that when she went to bed that evening she was completely fine when she went to wake up the next day she was having pain on the left side of her low back with radicular symptoms down her leg.  At that time she went to a emergency department who did lumbar x-rays and discharged her home.  She reports that she has a hard time getting up and down reports that she has \"slid\" out of bed 2 times since Tuesday morning.  Upon chart review it is noted that patient was treated for left lower extremity cellulitis April 2025 and was treated with Rocephin and vancomycin x 4 days and discharged with Augmentin and doxycycline for another 10 days.  CT of the abdomen pelvis with IV contrast was performed at outside hospital that did demonstrate an abnormal lucency with mild surrounding sclerosis in the inferior plate of L2 and superior endplate of L3 findings consistent with discitis and vertebral osteomyelitis L2-3 with associated left psoas abscess.  There was also noted a faint epidural fluid collection at L2-3 through L3-4 level

## 2025-07-01 NOTE — ED NOTES
Urvashi Tse [birth year]  80-year-old female with L2-L3 discitis, osteomyelitis and spinal epidural abscess visualized on CT  1 week of back pain, confused today  CT brain with no acute findings  CT L-spine with psoas abscess, 5.6 mm spinal epidural abscess and discitis/osteomyelitis  Moving all extremities, weakness in hip flexion  Ground    Accepted by Dr Ferrera

## 2025-07-02 ENCOUNTER — APPOINTMENT (OUTPATIENT)
Dept: CT IMAGING | Age: 80
DRG: 853 | End: 2025-07-02
Payer: MEDICARE

## 2025-07-02 ENCOUNTER — APPOINTMENT (OUTPATIENT)
Age: 80
DRG: 853 | End: 2025-07-02
Attending: INTERNAL MEDICINE
Payer: MEDICARE

## 2025-07-02 PROBLEM — R91.8 MASS OF LOWER LOBE OF LEFT LUNG: Status: ACTIVE | Noted: 2025-07-02

## 2025-07-02 PROBLEM — N28.9 RENAL LESION: Status: ACTIVE | Noted: 2025-07-02

## 2025-07-02 PROBLEM — S36.119A LIVER INJURY: Status: ACTIVE | Noted: 2025-07-02

## 2025-07-02 LAB
A1AT SERPL-MCNC: 286 MG/DL (ref 90–200)
ALBUMIN SERPL-MCNC: 2.5 G/DL (ref 3.5–5.2)
ALBUMIN/GLOB SERPL: 0.7 {RATIO} (ref 1–2.5)
ALP SERPL-CCNC: 493 U/L (ref 35–104)
ALT SERPL-CCNC: 1128 U/L (ref 10–35)
ANION GAP SERPL CALCULATED.3IONS-SCNC: 13 MMOL/L (ref 9–16)
AST SERPL-CCNC: 1624 U/L (ref 10–35)
BASOPHILS # BLD: 0 K/UL (ref 0–0.2)
BASOPHILS # BLD: 0.18 K/UL (ref 0–0.2)
BASOPHILS NFR BLD: 0 % (ref 0–2)
BASOPHILS NFR BLD: 1 % (ref 0–2)
BILIRUB SERPL-MCNC: 2.9 MG/DL (ref 0–1.2)
BUN SERPL-MCNC: 25 MG/DL (ref 8–23)
CALCIUM SERPL-MCNC: 8.7 MG/DL (ref 8.6–10.4)
CHLORIDE SERPL-SCNC: 102 MMOL/L (ref 98–107)
CO2 SERPL-SCNC: 21 MMOL/L (ref 20–31)
CREAT SERPL-MCNC: 0.8 MG/DL (ref 0.6–0.9)
EOSINOPHIL # BLD: 0.18 K/UL (ref 0–0.44)
EOSINOPHIL # BLD: 0.33 K/UL (ref 0–0.4)
EOSINOPHILS RELATIVE PERCENT: 1 % (ref 1–4)
EOSINOPHILS RELATIVE PERCENT: 2 % (ref 1–4)
ERYTHROCYTE [DISTWIDTH] IN BLOOD BY AUTOMATED COUNT: 13.4 % (ref 11.8–14.4)
ERYTHROCYTE [DISTWIDTH] IN BLOOD BY AUTOMATED COUNT: 13.5 % (ref 11.8–14.4)
FIBRINOGEN PPP-MCNC: 800 MG/DL (ref 203–521)
GFR, ESTIMATED: 75 ML/MIN/1.73M2
GLUCOSE BLD-MCNC: 112 MG/DL (ref 65–105)
GLUCOSE BLD-MCNC: 131 MG/DL (ref 65–105)
GLUCOSE BLD-MCNC: 86 MG/DL (ref 65–105)
GLUCOSE SERPL-MCNC: 111 MG/DL (ref 74–99)
HAPTOGLOB SERPL-MCNC: 476 MG/DL (ref 30–200)
HCT VFR BLD AUTO: 28.6 % (ref 36.3–47.1)
HCT VFR BLD AUTO: 30.4 % (ref 36.3–47.1)
HGB BLD-MCNC: 10.4 G/DL (ref 11.9–15.1)
HGB BLD-MCNC: 9.5 G/DL (ref 11.9–15.1)
IMM GRANULOCYTES # BLD AUTO: 0.16 K/UL (ref 0–0.3)
IMM GRANULOCYTES # BLD AUTO: 0.73 K/UL (ref 0–0.3)
IMM GRANULOCYTES NFR BLD: 1 %
IMM GRANULOCYTES NFR BLD: 4 %
IMM RETICS NFR: 10.4 % (ref 2.7–18.3)
IRON SATN MFR SERPL: 56 % (ref 20–55)
IRON SERPL-MCNC: 75 UG/DL (ref 37–145)
LDH SERPL-CCNC: 284 U/L (ref 135–214)
LYMPHOCYTES NFR BLD: 0.49 K/UL (ref 1–4.8)
LYMPHOCYTES NFR BLD: 0.92 K/UL (ref 1.1–3.7)
LYMPHOCYTES RELATIVE PERCENT: 3 % (ref 24–44)
LYMPHOCYTES RELATIVE PERCENT: 5 % (ref 24–43)
MCH RBC QN AUTO: 31.1 PG (ref 25.2–33.5)
MCH RBC QN AUTO: 31.7 PG (ref 25.2–33.5)
MCHC RBC AUTO-ENTMCNC: 33.2 G/DL (ref 28.4–34.8)
MCHC RBC AUTO-ENTMCNC: 34.2 G/DL (ref 28.4–34.8)
MCV RBC AUTO: 92.7 FL (ref 82.6–102.9)
MCV RBC AUTO: 93.8 FL (ref 82.6–102.9)
MICROORGANISM SPEC CULT: ABNORMAL
MONOCYTES NFR BLD: 0.16 K/UL (ref 0.1–0.8)
MONOCYTES NFR BLD: 1 % (ref 1–7)
MONOCYTES NFR BLD: 1.28 K/UL (ref 0.1–1.2)
MONOCYTES NFR BLD: 7 % (ref 3–12)
MORPHOLOGY: ABNORMAL
MORPHOLOGY: NORMAL
NEUTROPHILS NFR BLD: 82 % (ref 36–65)
NEUTROPHILS NFR BLD: 93 % (ref 36–66)
NEUTS SEG NFR BLD: 15.01 K/UL (ref 1.5–8.1)
NEUTS SEG NFR BLD: 15.16 K/UL (ref 1.8–7.7)
NRBC BLD-RTO: 0 PER 100 WBC
NRBC BLD-RTO: 0 PER 100 WBC
PLATELET # BLD AUTO: 328 K/UL (ref 138–453)
PLATELET # BLD AUTO: 348 K/UL (ref 138–453)
PMV BLD AUTO: 9 FL (ref 8.1–13.5)
PMV BLD AUTO: 9 FL (ref 8.1–13.5)
POTASSIUM SERPL-SCNC: 3.6 MMOL/L (ref 3.7–5.3)
PROT SERPL-MCNC: 6 G/DL (ref 6.6–8.7)
RBC # BLD AUTO: 3.05 M/UL (ref 3.95–5.11)
RBC # BLD AUTO: 3.28 M/UL (ref 3.95–5.11)
RETIC HEMOGLOBIN: 31.3 PG (ref 28.2–35.7)
RETICS # AUTO: 0.01 M/UL (ref 0.03–0.08)
RETICS/RBC NFR AUTO: 0.4 % (ref 0.5–1.9)
SERVICE CMNT-IMP: ABNORMAL
SERVICE CMNT-IMP: ABNORMAL
SODIUM SERPL-SCNC: 136 MMOL/L (ref 136–145)
SPECIMEN DESCRIPTION: ABNORMAL
SPECIMEN DESCRIPTION: ABNORMAL
TIBC SERPL-MCNC: 134 UG/DL (ref 250–450)
UNSATURATED IRON BINDING CAPACITY: 59 UG/DL (ref 112–347)
WBC OTHER # BLD: 16.3 K/UL (ref 3.5–11.3)
WBC OTHER # BLD: 18.3 K/UL (ref 3.5–11.3)

## 2025-07-02 PROCEDURE — 6360000002 HC RX W HCPCS: Performed by: INTERNAL MEDICINE

## 2025-07-02 PROCEDURE — 6360000002 HC RX W HCPCS: Performed by: NURSE PRACTITIONER

## 2025-07-02 PROCEDURE — 6370000000 HC RX 637 (ALT 250 FOR IP): Performed by: NURSE PRACTITIONER

## 2025-07-02 PROCEDURE — 87186 SC STD MICRODIL/AGAR DIL: CPT

## 2025-07-02 PROCEDURE — 85025 COMPLETE CBC W/AUTO DIFF WBC: CPT

## 2025-07-02 PROCEDURE — 0K9P3ZZ DRAINAGE OF LEFT HIP MUSCLE, PERCUTANEOUS APPROACH: ICD-10-PCS | Performed by: RADIOLOGY

## 2025-07-02 PROCEDURE — 87070 CULTURE OTHR SPECIMN AEROBIC: CPT

## 2025-07-02 PROCEDURE — 85045 AUTOMATED RETICULOCYTE COUNT: CPT

## 2025-07-02 PROCEDURE — 99232 SBSQ HOSP IP/OBS MODERATE 35: CPT | Performed by: NURSE PRACTITIONER

## 2025-07-02 PROCEDURE — 6370000000 HC RX 637 (ALT 250 FOR IP)

## 2025-07-02 PROCEDURE — 2709999900 CT ABSCESS DRAINAGE

## 2025-07-02 PROCEDURE — 83540 ASSAY OF IRON: CPT

## 2025-07-02 PROCEDURE — 82103 ALPHA-1-ANTITRYPSIN TOTAL: CPT

## 2025-07-02 PROCEDURE — 88112 CYTOPATH CELL ENHANCE TECH: CPT

## 2025-07-02 PROCEDURE — 2500000003 HC RX 250 WO HCPCS: Performed by: NURSE PRACTITIONER

## 2025-07-02 PROCEDURE — 97530 THERAPEUTIC ACTIVITIES: CPT

## 2025-07-02 PROCEDURE — 93306 TTE W/DOPPLER COMPLETE: CPT

## 2025-07-02 PROCEDURE — 2580000003 HC RX 258: Performed by: NURSE PRACTITIONER

## 2025-07-02 PROCEDURE — 88305 TISSUE EXAM BY PATHOLOGIST: CPT

## 2025-07-02 PROCEDURE — 1200000000 HC SEMI PRIVATE

## 2025-07-02 PROCEDURE — 99233 SBSQ HOSP IP/OBS HIGH 50: CPT | Performed by: INTERNAL MEDICINE

## 2025-07-02 PROCEDURE — 6360000004 HC RX CONTRAST MEDICATION: Performed by: INTERNAL MEDICINE

## 2025-07-02 PROCEDURE — 99222 1ST HOSP IP/OBS MODERATE 55: CPT | Performed by: INTERNAL MEDICINE

## 2025-07-02 PROCEDURE — 93306 TTE W/DOPPLER COMPLETE: CPT | Performed by: INTERNAL MEDICINE

## 2025-07-02 PROCEDURE — 85384 FIBRINOGEN ACTIVITY: CPT

## 2025-07-02 PROCEDURE — 2580000003 HC RX 258: Performed by: INTERNAL MEDICINE

## 2025-07-02 PROCEDURE — 83010 ASSAY OF HAPTOGLOBIN QUANT: CPT

## 2025-07-02 PROCEDURE — 71260 CT THORAX DX C+: CPT

## 2025-07-02 PROCEDURE — 87075 CULTR BACTERIA EXCEPT BLOOD: CPT

## 2025-07-02 PROCEDURE — 36415 COLL VENOUS BLD VENIPUNCTURE: CPT

## 2025-07-02 PROCEDURE — 83550 IRON BINDING TEST: CPT

## 2025-07-02 PROCEDURE — 6370000000 HC RX 637 (ALT 250 FOR IP): Performed by: HOSPITALIST

## 2025-07-02 PROCEDURE — 97162 PT EVAL MOD COMPLEX 30 MIN: CPT

## 2025-07-02 PROCEDURE — 82947 ASSAY GLUCOSE BLOOD QUANT: CPT

## 2025-07-02 PROCEDURE — 83615 LACTATE (LD) (LDH) ENZYME: CPT

## 2025-07-02 PROCEDURE — 86403 PARTICLE AGGLUT ANTBDY SCRN: CPT

## 2025-07-02 PROCEDURE — 87205 SMEAR GRAM STAIN: CPT

## 2025-07-02 PROCEDURE — 80053 COMPREHEN METABOLIC PANEL: CPT

## 2025-07-02 RX ORDER — IOPAMIDOL 755 MG/ML
75 INJECTION, SOLUTION INTRAVASCULAR
Status: COMPLETED | OUTPATIENT
Start: 2025-07-02 | End: 2025-07-02

## 2025-07-02 RX ORDER — DEXTROSE MONOHYDRATE 50 MG/ML
INJECTION, SOLUTION INTRAVENOUS AS NEEDED
Status: DISCONTINUED | OUTPATIENT
Start: 2025-07-02 | End: 2025-07-10 | Stop reason: HOSPADM

## 2025-07-02 RX ADMIN — NAFCILLIN SODIUM 2000 MG: 2 INJECTION, POWDER, LYOPHILIZED, FOR SOLUTION INTRAMUSCULAR; INTRAVENOUS at 09:59

## 2025-07-02 RX ADMIN — NAFCILLIN SODIUM 2000 MG: 2 INJECTION, POWDER, LYOPHILIZED, FOR SOLUTION INTRAMUSCULAR; INTRAVENOUS at 05:46

## 2025-07-02 RX ADMIN — NAFCILLIN SODIUM 2000 MG: 2 INJECTION, POWDER, LYOPHILIZED, FOR SOLUTION INTRAMUSCULAR; INTRAVENOUS at 01:43

## 2025-07-02 RX ADMIN — ATORVASTATIN CALCIUM 80 MG: 80 TABLET, FILM COATED ORAL at 08:43

## 2025-07-02 RX ADMIN — Medication 2000 MG: at 18:46

## 2025-07-02 RX ADMIN — MORPHINE SULFATE 2 MG: 2 INJECTION, SOLUTION INTRAMUSCULAR; INTRAVENOUS at 20:54

## 2025-07-02 RX ADMIN — NAFCILLIN SODIUM 2000 MG: 2 INJECTION, POWDER, LYOPHILIZED, FOR SOLUTION INTRAMUSCULAR; INTRAVENOUS at 12:46

## 2025-07-02 RX ADMIN — AMPHOTERICIN B 300 MG: 50 INJECTION, POWDER, LYOPHILIZED, FOR SOLUTION INTRAVENOUS at 18:52

## 2025-07-02 RX ADMIN — SERTRALINE 100 MG: 50 TABLET, FILM COATED ORAL at 08:43

## 2025-07-02 RX ADMIN — ENOXAPARIN SODIUM 40 MG: 100 INJECTION SUBCUTANEOUS at 08:44

## 2025-07-02 RX ADMIN — DEXTROSE: 5 SOLUTION INTRAVENOUS at 18:51

## 2025-07-02 RX ADMIN — METFORMIN HYDROCHLORIDE 500 MG: 500 TABLET ORAL at 08:43

## 2025-07-02 RX ADMIN — IOPAMIDOL 75 ML: 755 INJECTION, SOLUTION INTRAVENOUS at 11:00

## 2025-07-02 RX ADMIN — METOPROLOL SUCCINATE 12.5 MG: 25 TABLET, FILM COATED, EXTENDED RELEASE ORAL at 08:43

## 2025-07-02 RX ADMIN — SODIUM CHLORIDE, PRESERVATIVE FREE 10 ML: 5 INJECTION INTRAVENOUS at 08:43

## 2025-07-02 RX ADMIN — MICAFUNGIN SODIUM 100 MG: 100 INJECTION, POWDER, LYOPHILIZED, FOR SOLUTION INTRAVENOUS at 08:49

## 2025-07-02 RX ADMIN — LOSARTAN POTASSIUM 50 MG: 50 TABLET, FILM COATED ORAL at 08:43

## 2025-07-02 ASSESSMENT — PAIN DESCRIPTION - DESCRIPTORS: DESCRIPTORS: ACHING

## 2025-07-02 ASSESSMENT — PAIN SCALES - GENERAL
PAINLEVEL_OUTOF10: 7
PAINLEVEL_OUTOF10: 5

## 2025-07-02 ASSESSMENT — PAIN DESCRIPTION - LOCATION: LOCATION: BACK

## 2025-07-02 NOTE — BRIEF OP NOTE
Brief Postoperative Note    Urvashi Tse  YOB: 1945  [id number]    Pre-operative Diagnosis: Left psoas fluid collection    Post-operative Diagnosis: Same    Procedure: Left psoas fluid collection aspiration    Anesthesia: Local    Surgeons/Assistants: MD Jose    Estimated Blood Loss: Minimal    Complications: None    Specimens: Was Obtained:     Findings: Successful aspiration of left psoas fluid collection.  5 mL of viscus bloody fluid collected.     Electronically signed by LUISANA Colon on 7/2/2025 at 3:59 PM

## 2025-07-02 NOTE — CONSULTS
[facility]  Consultation Note     .  Chief Complaint:  Back pain    Reason for consult:    Transaminitis    History of present illness:   This is a 80 y.o. female who presented to the ED for evaluation of severe back pain found to have discitis with vertebral osteomyelitis with left psoas muscle and epidural abscess.  Neurosurgery was consulted.  Patient's blood cultures were positive for MSSA along with Candida infection she was seen by infectious disease started on antibiotics and antifungals  GI was consulted due to transaminitis  Liver ultrasound consistent with cholelithiasis, echogenic lesion in the midpole of the right kidney, CBD measures 8.7 mm  MRI MRCP is pending  Patient is scheduled for CT-guided abscess drainage  Patient denies any smoking drinking drug use or NSAID intake  Denies any right upper quadrant pain nausea or vomiting  No change in stools  Patient is noted to be on micafungin and nafcillin    Summary of current labs completed at this time:    Metabolic: K3.6, BUN 25  Hematology: WBCs 18.3, hemoglobin 12.3 to 9.8 g/dL, MCV 93.8, platelets 328,  Coags: INR 1.2  Liver profile: Alk phos 382-493, ALT ,  2 1624, T. Bili 1.7-2.9, lipase normal, acute hepatitis panel nonreactive  Cardiac profile: N/A      On physician exam: Patient is experiencing severe back pain which is her main complaint.  Patient experiencing some hallucinations at this time-thinks she is seeing people in the room that are not there      Previous GI history:   2016 colonoscopy identified small internal hemorrhoids  Primary GI specialist:    Past Medical/Social/Family History:  Past Medical History:   Diagnosis Date    COPD (chronic obstructive pulmonary disease) (HCC)     Diabetes mellitus (HCC)     Hyperlipidemia     Hypertension     Lichen planus      Past Surgical History:   Procedure Laterality Date    COLONOSCOPY  03/21/2016    -hemorrhoids    DENTAL SURGERY      JOINT

## 2025-07-02 NOTE — OR NURSING
CT in use by Dr Garcia, site prpepped/draped; Lidocaine injected into left lower back site by Dr Garcia

## 2025-07-02 NOTE — OR NURSING
Site access by dr Garcia and aspirated reddish fluid from PSOAS  area.. Collected into specimen cup, labeled and send to lab via tube system.

## 2025-07-03 ENCOUNTER — TELEPHONE (OUTPATIENT)
Dept: ONCOLOGY | Age: 80
End: 2025-07-03

## 2025-07-03 ENCOUNTER — APPOINTMENT (OUTPATIENT)
Dept: MRI IMAGING | Age: 80
DRG: 853 | End: 2025-07-03
Payer: MEDICARE

## 2025-07-03 PROBLEM — B17.9 ACUTE HEPATITIS: Status: ACTIVE | Noted: 2025-07-03

## 2025-07-03 PROBLEM — K80.50 CHOLEDOCHOLITHIASIS: Status: ACTIVE | Noted: 2025-07-03

## 2025-07-03 PROBLEM — R74.01 TRANSAMINITIS: Status: ACTIVE | Noted: 2025-07-03

## 2025-07-03 LAB
AFP SERPL-MCNC: <1.8 UG/L
ALBUMIN SERPL-MCNC: 2.5 G/DL (ref 3.5–5.2)
ALBUMIN/GLOB SERPL: 0.7 {RATIO} (ref 1–2.5)
ALP SERPL-CCNC: 472 U/L (ref 35–104)
ALT SERPL-CCNC: 587 U/L (ref 10–35)
ANA SER QL IA: NEGATIVE
ANION GAP SERPL CALCULATED.3IONS-SCNC: 13 MMOL/L (ref 9–16)
AST SERPL-CCNC: 721 U/L (ref 10–35)
BASOPHILS # BLD: 0 K/UL (ref 0–0.2)
BASOPHILS NFR BLD: 0 % (ref 0–2)
BILIRUB SERPL-MCNC: 1.4 MG/DL (ref 0–1.2)
BUN SERPL-MCNC: 21 MG/DL (ref 8–23)
CALCIUM SERPL-MCNC: 8.7 MG/DL (ref 8.6–10.4)
CHLORIDE SERPL-SCNC: 101 MMOL/L (ref 98–107)
CO2 SERPL-SCNC: 21 MMOL/L (ref 20–31)
CREAT SERPL-MCNC: 0.8 MG/DL (ref 0.6–0.9)
DSDNA IGG SER QL IA: 2 IU/ML
ECHO AO ROOT DIAM: 3 CM
ECHO AO ROOT INDEX: 1.84 CM/M2
ECHO AV AREA PEAK VELOCITY: 2 CM2
ECHO AV AREA VTI: 2.3 CM2
ECHO AV AREA/BSA PEAK VELOCITY: 1.2 CM2/M2
ECHO AV AREA/BSA VTI: 1.4 CM2/M2
ECHO AV MEAN GRADIENT: 5 MMHG
ECHO AV MEAN VELOCITY: 1 M/S
ECHO AV PEAK GRADIENT: 11 MMHG
ECHO AV PEAK VELOCITY: 1.7 M/S
ECHO AV VELOCITY RATIO: 0.88
ECHO AV VTI: 28.3 CM
ECHO BSA: 1.6 M2
ECHO EST RA PRESSURE: 3 MMHG
ECHO LA AREA 2C: 11.1 CM2
ECHO LA AREA 4C: 7.2 CM2
ECHO LA DIAMETER INDEX: 2.21 CM/M2
ECHO LA DIAMETER: 3.6 CM
ECHO LA MAJOR AXIS: 4.1 CM
ECHO LA MINOR AXIS: 4.6 CM
ECHO LA TO AORTIC ROOT RATIO: 1.2
ECHO LA VOL BP: 16 ML (ref 22–52)
ECHO LA VOL MOD A2C: 23 ML (ref 22–52)
ECHO LA VOL MOD A4C: 11 ML (ref 22–52)
ECHO LA VOL/BSA BIPLANE: 10 ML/M2 (ref 16–34)
ECHO LA VOLUME INDEX MOD A2C: 14 ML/M2 (ref 16–34)
ECHO LA VOLUME INDEX MOD A4C: 7 ML/M2 (ref 16–34)
ECHO LV E' LATERAL VELOCITY: 7.62 CM/S
ECHO LV E' SEPTAL VELOCITY: 8.59 CM/S
ECHO LV EDV A2C: 32 ML
ECHO LV EDV A4C: 33 ML
ECHO LV EDV INDEX A4C: 20 ML/M2
ECHO LV EDV NDEX A2C: 20 ML/M2
ECHO LV EF PHYSICIAN: 71 %
ECHO LV EJECTION FRACTION A2C: 72 %
ECHO LV EJECTION FRACTION A4C: 67 %
ECHO LV EJECTION FRACTION BIPLANE: 71 % (ref 55–100)
ECHO LV ESV A2C: 9 ML
ECHO LV ESV A4C: 11 ML
ECHO LV ESV INDEX A2C: 6 ML/M2
ECHO LV ESV INDEX A4C: 7 ML/M2
ECHO LV FRACTIONAL SHORTENING: 29 % (ref 28–44)
ECHO LV INTERNAL DIMENSION DIASTOLE INDEX: 3.19 CM/M2
ECHO LV INTERNAL DIMENSION DIASTOLIC: 5.2 CM (ref 3.9–5.3)
ECHO LV INTERNAL DIMENSION SYSTOLIC INDEX: 2.27 CM/M2
ECHO LV INTERNAL DIMENSION SYSTOLIC: 3.7 CM
ECHO LV IVSD: 0.8 CM (ref 0.6–0.9)
ECHO LV MASS 2D: 156.9 G (ref 67–162)
ECHO LV MASS INDEX 2D: 96.3 G/M2 (ref 43–95)
ECHO LV POSTERIOR WALL DIASTOLIC: 0.9 CM (ref 0.6–0.9)
ECHO LV RELATIVE WALL THICKNESS RATIO: 0.35
ECHO LVOT AREA: 2.3 CM2
ECHO LVOT AV VTI INDEX: 0.99
ECHO LVOT DIAM: 1.7 CM
ECHO LVOT MEAN GRADIENT: 4 MMHG
ECHO LVOT PEAK GRADIENT: 9 MMHG
ECHO LVOT PEAK VELOCITY: 1.5 M/S
ECHO LVOT STROKE VOLUME INDEX: 39 ML/M2
ECHO LVOT SV: 63.5 ML
ECHO LVOT VTI: 28 CM
ECHO MV A VELOCITY: 1.1 M/S
ECHO MV AREA VTI: 2.4 CM2
ECHO MV E DECELERATION TIME (DT): 252 MS
ECHO MV E VELOCITY: 0.84 M/S
ECHO MV E/A RATIO: 0.76
ECHO MV E/E' LATERAL: 11.02
ECHO MV E/E' RATIO (AVERAGED): 10.4
ECHO MV E/E' SEPTAL: 9.78
ECHO MV LVOT VTI INDEX: 0.94
ECHO MV MAX VELOCITY: 1.2 M/S
ECHO MV MEAN GRADIENT: 2 MMHG
ECHO MV MEAN VELOCITY: 0.7 M/S
ECHO MV PEAK GRADIENT: 6 MMHG
ECHO MV VTI: 26.4 CM
ECHO PV MAX VELOCITY: 1.2 M/S
ECHO PV PEAK GRADIENT: 6 MMHG
ECHO PVEIN A VELOCITY: 0.4 M/S
ECHO RA AREA 4C: 8.2 CM2
ECHO RA END SYSTOLIC VOLUME APICAL 4 CHAMBER INDEX BSA: 9 ML/M2
ECHO RA VOLUME: 14 ML
ECHO RV BASAL DIMENSION: 3.2 CM
ECHO RV FREE WALL PEAK S': 17.9 CM/S
ECHO RV TAPSE: 2.2 CM (ref 1.7–?)
EOSINOPHIL # BLD: 0.23 K/UL (ref 0–0.4)
EOSINOPHILS RELATIVE PERCENT: 1 % (ref 1–4)
ERYTHROCYTE [DISTWIDTH] IN BLOOD BY AUTOMATED COUNT: 13.6 % (ref 11.8–14.4)
GFR, ESTIMATED: 75 ML/MIN/1.73M2
GLUCOSE BLD-MCNC: 153 MG/DL (ref 65–105)
GLUCOSE BLD-MCNC: 156 MG/DL (ref 65–105)
GLUCOSE BLD-MCNC: 194 MG/DL (ref 65–105)
GLUCOSE SERPL-MCNC: 122 MG/DL (ref 74–99)
HAV IGM SERPL QL IA: NONREACTIVE
HBV CORE IGM SERPL QL IA: NONREACTIVE
HBV SURFACE AG SERPL QL IA: NONREACTIVE
HCT VFR BLD AUTO: 30.1 % (ref 36.3–47.1)
HCV AB SERPL QL IA: NONREACTIVE
HGB BLD-MCNC: 10 G/DL (ref 11.9–15.1)
IGA SERPL-MCNC: 319 MG/DL (ref 70–400)
IMM GRANULOCYTES # BLD AUTO: 0.46 K/UL (ref 0–0.3)
IMM GRANULOCYTES NFR BLD: 2 %
INR PPP: 1.2
LYMPHOCYTES NFR BLD: 0.69 K/UL (ref 1–4.8)
LYMPHOCYTES RELATIVE PERCENT: 3 % (ref 24–44)
MCH RBC QN AUTO: 31.3 PG (ref 25.2–33.5)
MCHC RBC AUTO-ENTMCNC: 33.2 G/DL (ref 28.4–34.8)
MCV RBC AUTO: 94.4 FL (ref 82.6–102.9)
MICROORGANISM SPEC CULT: ABNORMAL
MITOCHONDRIA M2 IGG SER-ACNC: 1.8 U/ML (ref 0–4)
MONOCYTES NFR BLD: 1.15 K/UL (ref 0.1–0.8)
MONOCYTES NFR BLD: 5 % (ref 1–7)
MORPHOLOGY: NORMAL
NEUTROPHILS NFR BLD: 89 % (ref 36–66)
NEUTS SEG NFR BLD: 20.37 K/UL (ref 1.8–7.7)
NRBC BLD-RTO: 0 PER 100 WBC
NUCLEAR IGG SER IA-RTO: <0.1 U/ML
PATH REV BLD -IMP: NORMAL
PLATELET # BLD AUTO: 383 K/UL (ref 138–453)
PMV BLD AUTO: 9.1 FL (ref 8.1–13.5)
POTASSIUM SERPL-SCNC: 3 MMOL/L (ref 3.7–5.3)
PROT SERPL-MCNC: 6.2 G/DL (ref 6.6–8.7)
PROTHROMBIN TIME: 15.3 SEC (ref 11.7–14.9)
RBC # BLD AUTO: 3.19 M/UL (ref 3.95–5.11)
SERVICE CMNT-IMP: ABNORMAL
SERVICE CMNT-IMP: ABNORMAL
SODIUM SERPL-SCNC: 135 MMOL/L (ref 136–145)
SPECIMEN DESCRIPTION: ABNORMAL
SPECIMEN DESCRIPTION: ABNORMAL
SURGICAL PATHOLOGY REPORT: NORMAL
WBC OTHER # BLD: 22.9 K/UL (ref 3.5–11.3)

## 2025-07-03 PROCEDURE — 6360000002 HC RX W HCPCS: Performed by: NURSE PRACTITIONER

## 2025-07-03 PROCEDURE — 80053 COMPREHEN METABOLIC PANEL: CPT

## 2025-07-03 PROCEDURE — 99233 SBSQ HOSP IP/OBS HIGH 50: CPT | Performed by: INTERNAL MEDICINE

## 2025-07-03 PROCEDURE — 83874 ASSAY OF MYOGLOBIN: CPT

## 2025-07-03 PROCEDURE — 97530 THERAPEUTIC ACTIVITIES: CPT

## 2025-07-03 PROCEDURE — 93005 ELECTROCARDIOGRAM TRACING: CPT | Performed by: NURSE PRACTITIONER

## 2025-07-03 PROCEDURE — 36415 COLL VENOUS BLD VENIPUNCTURE: CPT

## 2025-07-03 PROCEDURE — 6370000000 HC RX 637 (ALT 250 FOR IP): Performed by: NURSE PRACTITIONER

## 2025-07-03 PROCEDURE — 1200000000 HC SEMI PRIVATE

## 2025-07-03 PROCEDURE — 99223 1ST HOSP IP/OBS HIGH 75: CPT | Performed by: INTERNAL MEDICINE

## 2025-07-03 PROCEDURE — 99222 1ST HOSP IP/OBS MODERATE 55: CPT | Performed by: SURGERY

## 2025-07-03 PROCEDURE — 2500000003 HC RX 250 WO HCPCS: Performed by: NURSE PRACTITIONER

## 2025-07-03 PROCEDURE — 84484 ASSAY OF TROPONIN QUANT: CPT

## 2025-07-03 PROCEDURE — 6360000002 HC RX W HCPCS: Performed by: INTERNAL MEDICINE

## 2025-07-03 PROCEDURE — 97166 OT EVAL MOD COMPLEX 45 MIN: CPT

## 2025-07-03 PROCEDURE — 85610 PROTHROMBIN TIME: CPT

## 2025-07-03 PROCEDURE — 2580000003 HC RX 258: Performed by: NURSE PRACTITIONER

## 2025-07-03 PROCEDURE — 97110 THERAPEUTIC EXERCISES: CPT

## 2025-07-03 PROCEDURE — 82947 ASSAY GLUCOSE BLOOD QUANT: CPT

## 2025-07-03 PROCEDURE — 85025 COMPLETE CBC W/AUTO DIFF WBC: CPT

## 2025-07-03 PROCEDURE — 6370000000 HC RX 637 (ALT 250 FOR IP): Performed by: INTERNAL MEDICINE

## 2025-07-03 PROCEDURE — 97535 SELF CARE MNGMENT TRAINING: CPT

## 2025-07-03 PROCEDURE — 6370000000 HC RX 637 (ALT 250 FOR IP): Performed by: HOSPITALIST

## 2025-07-03 PROCEDURE — 74181 MRI ABDOMEN W/O CONTRAST: CPT

## 2025-07-03 PROCEDURE — 99232 SBSQ HOSP IP/OBS MODERATE 35: CPT | Performed by: INTERNAL MEDICINE

## 2025-07-03 RX ORDER — ACETAMINOPHEN 500 MG
1000 TABLET ORAL EVERY 8 HOURS SCHEDULED
Status: DISCONTINUED | OUTPATIENT
Start: 2025-07-03 | End: 2025-07-05

## 2025-07-03 RX ORDER — FENTANYL CITRATE 50 UG/ML
25 INJECTION, SOLUTION INTRAMUSCULAR; INTRAVENOUS ONCE
Status: DISCONTINUED | OUTPATIENT
Start: 2025-07-03 | End: 2025-07-04

## 2025-07-03 RX ADMIN — AMPHOTERICIN B 300 MG: 50 INJECTION, POWDER, LYOPHILIZED, FOR SOLUTION INTRAVENOUS at 16:44

## 2025-07-03 RX ADMIN — SERTRALINE 100 MG: 50 TABLET, FILM COATED ORAL at 09:58

## 2025-07-03 RX ADMIN — Medication 2000 MG: at 01:33

## 2025-07-03 RX ADMIN — Medication 2000 MG: at 09:58

## 2025-07-03 RX ADMIN — MORPHINE SULFATE 2 MG: 2 INJECTION, SOLUTION INTRAMUSCULAR; INTRAVENOUS at 04:06

## 2025-07-03 RX ADMIN — ONDANSETRON 4 MG: 4 TABLET, ORALLY DISINTEGRATING ORAL at 10:39

## 2025-07-03 RX ADMIN — HYDROMORPHONE HYDROCHLORIDE 0.5 MG: 1 INJECTION, SOLUTION INTRAMUSCULAR; INTRAVENOUS; SUBCUTANEOUS at 22:52

## 2025-07-03 RX ADMIN — Medication 2000 MG: at 16:41

## 2025-07-03 RX ADMIN — METOPROLOL SUCCINATE 12.5 MG: 25 TABLET, FILM COATED, EXTENDED RELEASE ORAL at 09:58

## 2025-07-03 RX ADMIN — ACETAMINOPHEN 1000 MG: 500 TABLET ORAL at 15:13

## 2025-07-03 RX ADMIN — HYDROMORPHONE HYDROCHLORIDE 0.5 MG: 1 INJECTION, SOLUTION INTRAMUSCULAR; INTRAVENOUS; SUBCUTANEOUS at 10:36

## 2025-07-03 RX ADMIN — ACETAMINOPHEN 1000 MG: 500 TABLET ORAL at 21:32

## 2025-07-03 ASSESSMENT — PAIN DESCRIPTION - LOCATION
LOCATION: BACK

## 2025-07-03 ASSESSMENT — PAIN SCALES - GENERAL
PAINLEVEL_OUTOF10: 7
PAINLEVEL_OUTOF10: 5
PAINLEVEL_OUTOF10: 1
PAINLEVEL_OUTOF10: 7
PAINLEVEL_OUTOF10: 4
PAINLEVEL_OUTOF10: 10

## 2025-07-03 ASSESSMENT — PAIN DESCRIPTION - DESCRIPTORS
DESCRIPTORS: ACHING

## 2025-07-03 ASSESSMENT — PAIN DESCRIPTION - ORIENTATION: ORIENTATION: LEFT

## 2025-07-03 NOTE — CONSULTS
Today's Date: 7/3/2025  Patient Name: Urvashi Tse  Date of admission: 6/30/2025 10:43 PM  Patient's age: 80 y.o., 1945  Admission Dx: Epidural abscess [G06.2]  Transaminitis [R74.01]  Vertebral osteomyelitis (HCC) [M46.20]    Reason for Consult: management recommendations  Requesting Physician: No admitting provider for patient encounter.    CHIEF COMPLAINT:  lung mass    History Obtained From:  patient, electronic medical record    HISTORY OF PRESENT ILLNESS:      The patient is a 80 y.o.  female who is admitted to the hospital chief complaints of back pain.  CT scan showed discitis and vertebral osteomyelitis with left psoas muscle and epidural abscess.  Blood cultures positive for MSSA.  TTE negative for vegetations.  CT chest shows left lower lobe pulmonary nodule measuring 2.5 cm with necrotic and possibly neoplasm.  Also shows possibly cavitated nodule in the left upper lobe.  Metastatic disease in the differential.  Oncology team consulted due to finding of lung nodule with necrosis and possible malignancy.  Patient has remote history of tobacco use.    Lab workup shows WBC count 22,000 hemoglobin 10.0 and platelet count 383.  LFTs abnormal but now improving.      Past Medical History:   has a past medical history of COPD (chronic obstructive pulmonary disease) (HCC), Diabetes mellitus (HCC), Hyperlipidemia, Hypertension, and Lichen planus.    Past Surgical History:   has a past surgical history that includes joint replacement; Colonoscopy (03/21/2016); Dental surgery; and Lithotripsy.     Medications:    Prior to Admission medications    Medication Sig Start Date End Date Taking? Authorizing Provider   losartan (COZAAR) 50 MG tablet take 1 tablet by mouth once daily 2/20/24   Geni Lee PA-C   atorvastatin (LIPITOR) 80 MG tablet take 1 tablet by mouth once daily 2/9/24   Geni Lee PA-C   acetaminophen (TYLENOL) 500 MG tablet Take 2 tablets by mouth every 6 hours as needed

## 2025-07-04 PROBLEM — I76 SEPTIC EMBOLISM (HCC): Status: ACTIVE | Noted: 2025-07-04

## 2025-07-04 LAB
ALBUMIN SERPL-MCNC: 2 G/DL (ref 3.5–5.2)
ALBUMIN/GLOB SERPL: 0.5 {RATIO} (ref 1–2.5)
ALP SERPL-CCNC: 433 U/L (ref 35–104)
ALT SERPL-CCNC: 291 U/L (ref 10–35)
ANION GAP SERPL CALCULATED.3IONS-SCNC: 11 MMOL/L (ref 9–16)
AST SERPL-CCNC: 493 U/L (ref 10–35)
BASOPHILS # BLD: 0.18 K/UL (ref 0–0.2)
BASOPHILS NFR BLD: 1 % (ref 0–2)
BILIRUB SERPL-MCNC: 1 MG/DL (ref 0–1.2)
BUN SERPL-MCNC: 34 MG/DL (ref 8–23)
CALCIUM SERPL-MCNC: 8.7 MG/DL (ref 8.6–10.4)
CHLORIDE SERPL-SCNC: 102 MMOL/L (ref 98–107)
CO2 SERPL-SCNC: 20 MMOL/L (ref 20–31)
CREAT SERPL-MCNC: 1.1 MG/DL (ref 0.6–0.9)
EOSINOPHIL # BLD: 0.18 K/UL (ref 0–0.44)
EOSINOPHILS RELATIVE PERCENT: 1 % (ref 1–4)
ERYTHROCYTE [DISTWIDTH] IN BLOOD BY AUTOMATED COUNT: 14.1 % (ref 11.8–14.4)
GFR, ESTIMATED: 51 ML/MIN/1.73M2
GLUCOSE BLD-MCNC: 101 MG/DL (ref 65–105)
GLUCOSE BLD-MCNC: 115 MG/DL (ref 65–105)
GLUCOSE BLD-MCNC: 131 MG/DL (ref 65–105)
GLUCOSE BLD-MCNC: 152 MG/DL (ref 65–105)
GLUCOSE SERPL-MCNC: 111 MG/DL (ref 74–99)
HCT VFR BLD AUTO: 27.5 % (ref 36.3–47.1)
HCT VFR BLD AUTO: 29.1 % (ref 36.3–47.1)
HGB BLD-MCNC: 8.8 G/DL (ref 11.9–15.1)
HGB BLD-MCNC: 9.8 G/DL (ref 11.9–15.1)
IMM GRANULOCYTES # BLD AUTO: 0.53 K/UL (ref 0–0.3)
IMM GRANULOCYTES NFR BLD: 3 %
INR PPP: 1.2
LYMPHOCYTES NFR BLD: 1.06 K/UL (ref 1.1–3.7)
LYMPHOCYTES RELATIVE PERCENT: 6 % (ref 24–43)
MAGNESIUM SERPL-MCNC: 2.6 MG/DL (ref 1.6–2.4)
MCH RBC QN AUTO: 32.1 PG (ref 25.2–33.5)
MCHC RBC AUTO-ENTMCNC: 32 G/DL (ref 28.4–34.8)
MCV RBC AUTO: 100.4 FL (ref 82.6–102.9)
MONOCYTES NFR BLD: 1.42 K/UL (ref 0.1–1.2)
MONOCYTES NFR BLD: 8 % (ref 3–12)
MORPHOLOGY: NORMAL
MYOGLOBIN SERPL-MCNC: 70 NG/ML (ref 25–58)
MYOGLOBIN SERPL-MCNC: 71 NG/ML (ref 25–58)
NEUTROPHILS NFR BLD: 81 % (ref 36–65)
NEUTS SEG NFR BLD: 14.33 K/UL (ref 1.5–8.1)
NRBC BLD-RTO: 0 PER 100 WBC
PLATELET # BLD AUTO: 322 K/UL (ref 138–453)
PMV BLD AUTO: 9.1 FL (ref 8.1–13.5)
POTASSIUM SERPL-SCNC: 3.2 MMOL/L (ref 3.7–5.3)
PROT SERPL-MCNC: 6.1 G/DL (ref 6.6–8.7)
PROTHROMBIN TIME: 15.4 SEC (ref 11.7–14.9)
RBC # BLD AUTO: 2.74 M/UL (ref 3.95–5.11)
SODIUM SERPL-SCNC: 133 MMOL/L (ref 136–145)
TROPONIN I SERPL HS-MCNC: 10 NG/L (ref 0–14)
TROPONIN I SERPL HS-MCNC: 9 NG/L (ref 0–14)
WBC OTHER # BLD: 17.7 K/UL (ref 3.5–11.3)

## 2025-07-04 PROCEDURE — 6360000002 HC RX W HCPCS: Performed by: INTERNAL MEDICINE

## 2025-07-04 PROCEDURE — 51798 US URINE CAPACITY MEASURE: CPT

## 2025-07-04 PROCEDURE — 2500000003 HC RX 250 WO HCPCS: Performed by: NURSE PRACTITIONER

## 2025-07-04 PROCEDURE — 99232 SBSQ HOSP IP/OBS MODERATE 35: CPT | Performed by: STUDENT IN AN ORGANIZED HEALTH CARE EDUCATION/TRAINING PROGRAM

## 2025-07-04 PROCEDURE — 6370000000 HC RX 637 (ALT 250 FOR IP): Performed by: INTERNAL MEDICINE

## 2025-07-04 PROCEDURE — 99232 SBSQ HOSP IP/OBS MODERATE 35: CPT | Performed by: INTERNAL MEDICINE

## 2025-07-04 PROCEDURE — 99231 SBSQ HOSP IP/OBS SF/LOW 25: CPT | Performed by: SURGERY

## 2025-07-04 PROCEDURE — 99233 SBSQ HOSP IP/OBS HIGH 50: CPT | Performed by: INTERNAL MEDICINE

## 2025-07-04 PROCEDURE — 97116 GAIT TRAINING THERAPY: CPT

## 2025-07-04 PROCEDURE — 6360000002 HC RX W HCPCS: Performed by: STUDENT IN AN ORGANIZED HEALTH CARE EDUCATION/TRAINING PROGRAM

## 2025-07-04 PROCEDURE — 2580000003 HC RX 258: Performed by: INTERNAL MEDICINE

## 2025-07-04 PROCEDURE — 85018 HEMOGLOBIN: CPT

## 2025-07-04 PROCEDURE — 6370000000 HC RX 637 (ALT 250 FOR IP): Performed by: STUDENT IN AN ORGANIZED HEALTH CARE EDUCATION/TRAINING PROGRAM

## 2025-07-04 PROCEDURE — 2500000003 HC RX 250 WO HCPCS: Performed by: STUDENT IN AN ORGANIZED HEALTH CARE EDUCATION/TRAINING PROGRAM

## 2025-07-04 PROCEDURE — 85025 COMPLETE CBC W/AUTO DIFF WBC: CPT

## 2025-07-04 PROCEDURE — 6370000000 HC RX 637 (ALT 250 FOR IP): Performed by: HOSPITALIST

## 2025-07-04 PROCEDURE — 80053 COMPREHEN METABOLIC PANEL: CPT

## 2025-07-04 PROCEDURE — 2580000003 HC RX 258: Performed by: STUDENT IN AN ORGANIZED HEALTH CARE EDUCATION/TRAINING PROGRAM

## 2025-07-04 PROCEDURE — 85610 PROTHROMBIN TIME: CPT

## 2025-07-04 PROCEDURE — 82947 ASSAY GLUCOSE BLOOD QUANT: CPT

## 2025-07-04 PROCEDURE — 97530 THERAPEUTIC ACTIVITIES: CPT

## 2025-07-04 PROCEDURE — 83735 ASSAY OF MAGNESIUM: CPT

## 2025-07-04 PROCEDURE — 36415 COLL VENOUS BLD VENIPUNCTURE: CPT

## 2025-07-04 PROCEDURE — 1200000000 HC SEMI PRIVATE

## 2025-07-04 PROCEDURE — 6370000000 HC RX 637 (ALT 250 FOR IP): Performed by: NURSE PRACTITIONER

## 2025-07-04 PROCEDURE — 84484 ASSAY OF TROPONIN QUANT: CPT

## 2025-07-04 PROCEDURE — 85014 HEMATOCRIT: CPT

## 2025-07-04 PROCEDURE — 83874 ASSAY OF MYOGLOBIN: CPT

## 2025-07-04 PROCEDURE — 6360000002 HC RX W HCPCS: Performed by: NURSE PRACTITIONER

## 2025-07-04 PROCEDURE — 97110 THERAPEUTIC EXERCISES: CPT

## 2025-07-04 RX ORDER — POTASSIUM CHLORIDE 1500 MG/1
40 TABLET, EXTENDED RELEASE ORAL ONCE
Status: COMPLETED | OUTPATIENT
Start: 2025-07-04 | End: 2025-07-04

## 2025-07-04 RX ORDER — SODIUM CHLORIDE 9 MG/ML
INJECTION, SOLUTION INTRAVENOUS CONTINUOUS
Status: ACTIVE | OUTPATIENT
Start: 2025-07-04 | End: 2025-07-04

## 2025-07-04 RX ORDER — QUETIAPINE FUMARATE 25 MG/1
50 TABLET, FILM COATED ORAL ONCE
Status: COMPLETED | OUTPATIENT
Start: 2025-07-04 | End: 2025-07-04

## 2025-07-04 RX ADMIN — POTASSIUM CHLORIDE 40 MEQ: 1500 TABLET, EXTENDED RELEASE ORAL at 10:22

## 2025-07-04 RX ADMIN — SODIUM CHLORIDE, PRESERVATIVE FREE 40 MG: 5 INJECTION INTRAVENOUS at 21:17

## 2025-07-04 RX ADMIN — METOPROLOL SUCCINATE 12.5 MG: 25 TABLET, FILM COATED, EXTENDED RELEASE ORAL at 08:47

## 2025-07-04 RX ADMIN — Medication 2000 MG: at 00:29

## 2025-07-04 RX ADMIN — SODIUM CHLORIDE, PRESERVATIVE FREE 40 MG: 5 INJECTION INTRAVENOUS at 10:22

## 2025-07-04 RX ADMIN — SODIUM CHLORIDE: 0.9 INJECTION, SOLUTION INTRAVENOUS at 10:21

## 2025-07-04 RX ADMIN — SODIUM CHLORIDE, PRESERVATIVE FREE 10 ML: 5 INJECTION INTRAVENOUS at 21:25

## 2025-07-04 RX ADMIN — SODIUM CHLORIDE, PRESERVATIVE FREE 10 ML: 5 INJECTION INTRAVENOUS at 08:47

## 2025-07-04 RX ADMIN — ACETAMINOPHEN 1000 MG: 500 TABLET ORAL at 21:17

## 2025-07-04 RX ADMIN — ACETAMINOPHEN 1000 MG: 500 TABLET ORAL at 15:21

## 2025-07-04 RX ADMIN — SERTRALINE 100 MG: 50 TABLET, FILM COATED ORAL at 08:47

## 2025-07-04 RX ADMIN — QUETIAPINE FUMARATE 50 MG: 25 TABLET ORAL at 21:16

## 2025-07-04 RX ADMIN — MICAFUNGIN SODIUM 100 MG: 100 INJECTION, POWDER, LYOPHILIZED, FOR SOLUTION INTRAVENOUS at 15:21

## 2025-07-04 RX ADMIN — Medication 2000 MG: at 18:14

## 2025-07-04 RX ADMIN — Medication 2000 MG: at 10:22

## 2025-07-04 ASSESSMENT — PAIN DESCRIPTION - DESCRIPTORS: DESCRIPTORS: DISCOMFORT

## 2025-07-04 ASSESSMENT — PAIN DESCRIPTION - ORIENTATION: ORIENTATION: MID

## 2025-07-04 ASSESSMENT — PAIN DESCRIPTION - LOCATION: LOCATION: BACK

## 2025-07-04 ASSESSMENT — PAIN SCALES - GENERAL: PAINLEVEL_OUTOF10: 2

## 2025-07-05 LAB
ALBUMIN SERPL-MCNC: 2.1 G/DL (ref 3.5–5.2)
ALBUMIN/GLOB SERPL: 0.5 {RATIO} (ref 1–2.5)
ALP SERPL-CCNC: 429 U/L (ref 35–104)
ALT SERPL-CCNC: 132 U/L (ref 10–35)
ANION GAP SERPL CALCULATED.3IONS-SCNC: 10 MMOL/L (ref 9–16)
AST SERPL-CCNC: 261 U/L (ref 10–35)
BASOPHILS # BLD: 0.03 K/UL (ref 0–0.2)
BASOPHILS NFR BLD: 0 % (ref 0–2)
BILIRUB SERPL-MCNC: 0.8 MG/DL (ref 0–1.2)
BUN SERPL-MCNC: 27 MG/DL (ref 8–23)
CALCIUM SERPL-MCNC: 8.7 MG/DL (ref 8.6–10.4)
CHLORIDE SERPL-SCNC: 106 MMOL/L (ref 98–107)
CO2 SERPL-SCNC: 21 MMOL/L (ref 20–31)
CREAT SERPL-MCNC: 0.9 MG/DL (ref 0.6–0.9)
DATE, STOOL #1: ABNORMAL
EKG ATRIAL RATE: 76 BPM
EKG P AXIS: 64 DEGREES
EKG P-R INTERVAL: 142 MS
EKG Q-T INTERVAL: 392 MS
EKG QRS DURATION: 98 MS
EKG QTC CALCULATION (BAZETT): 441 MS
EKG R AXIS: -27 DEGREES
EKG T AXIS: 29 DEGREES
EKG VENTRICULAR RATE: 76 BPM
EOSINOPHIL # BLD: 0.1 K/UL (ref 0–0.44)
EOSINOPHILS RELATIVE PERCENT: 1 % (ref 1–4)
ERYTHROCYTE [DISTWIDTH] IN BLOOD BY AUTOMATED COUNT: 13.9 % (ref 11.8–14.4)
GFR, ESTIMATED: 65 ML/MIN/1.73M2
GLUCOSE BLD-MCNC: 101 MG/DL (ref 65–105)
GLUCOSE BLD-MCNC: 132 MG/DL (ref 65–105)
GLUCOSE BLD-MCNC: 144 MG/DL (ref 65–105)
GLUCOSE BLD-MCNC: 161 MG/DL (ref 65–105)
GLUCOSE SERPL-MCNC: 128 MG/DL (ref 74–99)
HCT VFR BLD AUTO: 24.5 % (ref 36.3–47.1)
HCT VFR BLD AUTO: 25 % (ref 36.3–47.1)
HEMOCCULT SP1 STL QL: POSITIVE
HGB BLD-MCNC: 8 G/DL (ref 11.9–15.1)
HGB BLD-MCNC: 8.3 G/DL (ref 11.9–15.1)
IMM GRANULOCYTES # BLD AUTO: 0.39 K/UL (ref 0–0.3)
IMM GRANULOCYTES NFR BLD: 3 %
INR PPP: 1.2
LKM AB TITR SER IF: NORMAL {TITER}
LYMPHOCYTES NFR BLD: 0.89 K/UL (ref 1.1–3.7)
LYMPHOCYTES RELATIVE PERCENT: 7 % (ref 24–43)
MAGNESIUM SERPL-MCNC: 2.4 MG/DL (ref 1.6–2.4)
MCH RBC QN AUTO: 31 PG (ref 25.2–33.5)
MCHC RBC AUTO-ENTMCNC: 32.7 G/DL (ref 28.4–34.8)
MCV RBC AUTO: 95 FL (ref 82.6–102.9)
MICROORGANISM SPEC CULT: ABNORMAL
MICROORGANISM SPEC CULT: ABNORMAL
MICROORGANISM/AGENT SPEC: ABNORMAL
MICROORGANISM/AGENT SPEC: ABNORMAL
MONOCYTES NFR BLD: 0.96 K/UL (ref 0.1–1.2)
MONOCYTES NFR BLD: 8 % (ref 3–12)
NEUTROPHILS NFR BLD: 81 % (ref 36–65)
NEUTS SEG NFR BLD: 10.05 K/UL (ref 1.5–8.1)
NRBC BLD-RTO: 0 PER 100 WBC
PLATELET # BLD AUTO: 307 K/UL (ref 138–453)
PMV BLD AUTO: 9.2 FL (ref 8.1–13.5)
POTASSIUM SERPL-SCNC: 3.4 MMOL/L (ref 3.7–5.3)
PROT SERPL-MCNC: 6.2 G/DL (ref 6.6–8.7)
PROTHROMBIN TIME: 15.2 SEC (ref 11.7–14.9)
RBC # BLD AUTO: 2.58 M/UL (ref 3.95–5.11)
SODIUM SERPL-SCNC: 137 MMOL/L (ref 136–145)
SPECIMEN DESCRIPTION: ABNORMAL
TIME, STOOL #1: ABNORMAL
WBC OTHER # BLD: 12.4 K/UL (ref 3.5–11.3)

## 2025-07-05 PROCEDURE — 6370000000 HC RX 637 (ALT 250 FOR IP): Performed by: NURSE PRACTITIONER

## 2025-07-05 PROCEDURE — 6360000002 HC RX W HCPCS: Performed by: STUDENT IN AN ORGANIZED HEALTH CARE EDUCATION/TRAINING PROGRAM

## 2025-07-05 PROCEDURE — 80053 COMPREHEN METABOLIC PANEL: CPT

## 2025-07-05 PROCEDURE — 2500000003 HC RX 250 WO HCPCS: Performed by: NURSE PRACTITIONER

## 2025-07-05 PROCEDURE — 2500000003 HC RX 250 WO HCPCS: Performed by: STUDENT IN AN ORGANIZED HEALTH CARE EDUCATION/TRAINING PROGRAM

## 2025-07-05 PROCEDURE — 6370000000 HC RX 637 (ALT 250 FOR IP): Performed by: HOSPITALIST

## 2025-07-05 PROCEDURE — 85018 HEMOGLOBIN: CPT

## 2025-07-05 PROCEDURE — 6370000000 HC RX 637 (ALT 250 FOR IP)

## 2025-07-05 PROCEDURE — 99232 SBSQ HOSP IP/OBS MODERATE 35: CPT | Performed by: STUDENT IN AN ORGANIZED HEALTH CARE EDUCATION/TRAINING PROGRAM

## 2025-07-05 PROCEDURE — 1200000000 HC SEMI PRIVATE

## 2025-07-05 PROCEDURE — 2500000003 HC RX 250 WO HCPCS: Performed by: REGISTERED NURSE

## 2025-07-05 PROCEDURE — 6360000002 HC RX W HCPCS: Performed by: NURSE PRACTITIONER

## 2025-07-05 PROCEDURE — 85610 PROTHROMBIN TIME: CPT

## 2025-07-05 PROCEDURE — 93010 ELECTROCARDIOGRAM REPORT: CPT | Performed by: INTERNAL MEDICINE

## 2025-07-05 PROCEDURE — 82270 OCCULT BLOOD FECES: CPT

## 2025-07-05 PROCEDURE — 51798 US URINE CAPACITY MEASURE: CPT

## 2025-07-05 PROCEDURE — 6370000000 HC RX 637 (ALT 250 FOR IP): Performed by: STUDENT IN AN ORGANIZED HEALTH CARE EDUCATION/TRAINING PROGRAM

## 2025-07-05 PROCEDURE — 85025 COMPLETE CBC W/AUTO DIFF WBC: CPT

## 2025-07-05 PROCEDURE — 99232 SBSQ HOSP IP/OBS MODERATE 35: CPT | Performed by: INTERNAL MEDICINE

## 2025-07-05 PROCEDURE — 85014 HEMATOCRIT: CPT

## 2025-07-05 PROCEDURE — 2580000003 HC RX 258: Performed by: INTERNAL MEDICINE

## 2025-07-05 PROCEDURE — 36415 COLL VENOUS BLD VENIPUNCTURE: CPT

## 2025-07-05 PROCEDURE — 82947 ASSAY GLUCOSE BLOOD QUANT: CPT

## 2025-07-05 PROCEDURE — 87040 BLOOD CULTURE FOR BACTERIA: CPT

## 2025-07-05 PROCEDURE — 6360000002 HC RX W HCPCS: Performed by: INTERNAL MEDICINE

## 2025-07-05 PROCEDURE — G0545 PR INHERENT VISIT TO INPT: HCPCS | Performed by: INTERNAL MEDICINE

## 2025-07-05 PROCEDURE — 83735 ASSAY OF MAGNESIUM: CPT

## 2025-07-05 RX ORDER — QUETIAPINE FUMARATE 25 MG/1
25 TABLET, FILM COATED ORAL NIGHTLY
Status: DISCONTINUED | OUTPATIENT
Start: 2025-07-05 | End: 2025-07-08

## 2025-07-05 RX ORDER — POTASSIUM CHLORIDE 1.5 G/1.58G
20 POWDER, FOR SOLUTION ORAL DAILY
Status: ON HOLD | COMMUNITY
End: 2025-07-09 | Stop reason: HOSPADM

## 2025-07-05 RX ORDER — ACETAMINOPHEN 500 MG
1000 TABLET ORAL EVERY 8 HOURS PRN
Status: DISCONTINUED | OUTPATIENT
Start: 2025-07-05 | End: 2025-07-10 | Stop reason: HOSPADM

## 2025-07-05 RX ORDER — TRAMADOL HYDROCHLORIDE 50 MG/1
50 TABLET ORAL ONCE
Status: COMPLETED | OUTPATIENT
Start: 2025-07-05 | End: 2025-07-05

## 2025-07-05 RX ORDER — AMLODIPINE BESYLATE 5 MG/1
5 TABLET ORAL DAILY
COMMUNITY

## 2025-07-05 RX ADMIN — ACETAMINOPHEN 1000 MG: 500 TABLET ORAL at 18:21

## 2025-07-05 RX ADMIN — Medication 2000 MG: at 08:08

## 2025-07-05 RX ADMIN — TRAMADOL HYDROCHLORIDE 50 MG: 50 TABLET, COATED ORAL at 00:56

## 2025-07-05 RX ADMIN — SODIUM CHLORIDE, PRESERVATIVE FREE 40 MG: 5 INJECTION INTRAVENOUS at 21:10

## 2025-07-05 RX ADMIN — SODIUM CHLORIDE, PRESERVATIVE FREE 10 ML: 5 INJECTION INTRAVENOUS at 08:09

## 2025-07-05 RX ADMIN — QUETIAPINE FUMARATE 25 MG: 25 TABLET ORAL at 21:14

## 2025-07-05 RX ADMIN — SERTRALINE 100 MG: 50 TABLET, FILM COATED ORAL at 08:09

## 2025-07-05 RX ADMIN — METOPROLOL SUCCINATE 12.5 MG: 25 TABLET, FILM COATED, EXTENDED RELEASE ORAL at 08:10

## 2025-07-05 RX ADMIN — POTASSIUM BICARBONATE 40 MEQ: 782 TABLET, EFFERVESCENT ORAL at 09:20

## 2025-07-05 RX ADMIN — ACETAMINOPHEN 1000 MG: 500 TABLET ORAL at 09:46

## 2025-07-05 RX ADMIN — SODIUM CHLORIDE, PRESERVATIVE FREE 10 ML: 5 INJECTION INTRAVENOUS at 19:52

## 2025-07-05 RX ADMIN — SODIUM CHLORIDE, PRESERVATIVE FREE 10 ML: 5 INJECTION INTRAVENOUS at 00:25

## 2025-07-05 RX ADMIN — SODIUM CHLORIDE, PRESERVATIVE FREE 40 MG: 5 INJECTION INTRAVENOUS at 08:08

## 2025-07-05 RX ADMIN — Medication 2000 MG: at 18:00

## 2025-07-05 RX ADMIN — Medication 2000 MG: at 00:24

## 2025-07-05 RX ADMIN — MICAFUNGIN SODIUM 100 MG: 100 INJECTION, POWDER, LYOPHILIZED, FOR SOLUTION INTRAVENOUS at 09:15

## 2025-07-05 ASSESSMENT — PAIN SCALES - GENERAL
PAINLEVEL_OUTOF10: 0
PAINLEVEL_OUTOF10: 5
PAINLEVEL_OUTOF10: 2
PAINLEVEL_OUTOF10: 6

## 2025-07-05 ASSESSMENT — PAIN - FUNCTIONAL ASSESSMENT
PAIN_FUNCTIONAL_ASSESSMENT: PREVENTS OR INTERFERES SOME ACTIVE ACTIVITIES AND ADLS
PAIN_FUNCTIONAL_ASSESSMENT: PREVENTS OR INTERFERES SOME ACTIVE ACTIVITIES AND ADLS

## 2025-07-05 ASSESSMENT — PAIN DESCRIPTION - LOCATION
LOCATION: BACK
LOCATION: SHOULDER
LOCATION: SHOULDER

## 2025-07-05 ASSESSMENT — PAIN DESCRIPTION - PAIN TYPE
TYPE: CHRONIC PAIN
TYPE: ACUTE PAIN

## 2025-07-05 ASSESSMENT — PAIN DESCRIPTION - ORIENTATION
ORIENTATION: LEFT
ORIENTATION: LEFT

## 2025-07-05 ASSESSMENT — PAIN DESCRIPTION - DIRECTION: RADIATING_TOWARDS: BACK

## 2025-07-05 ASSESSMENT — PAIN DESCRIPTION - ONSET
ONSET: ON-GOING
ONSET: ON-GOING

## 2025-07-05 ASSESSMENT — PAIN DESCRIPTION - DESCRIPTORS
DESCRIPTORS: ACHING
DESCRIPTORS: DULL;ACHING
DESCRIPTORS: ACHING

## 2025-07-05 ASSESSMENT — PAIN DESCRIPTION - FREQUENCY
FREQUENCY: CONTINUOUS
FREQUENCY: CONTINUOUS

## 2025-07-06 LAB
ALBUMIN SERPL-MCNC: 2.5 G/DL (ref 3.5–5.2)
ALBUMIN/GLOB SERPL: 0.6 {RATIO} (ref 1–2.5)
ALP SERPL-CCNC: 435 U/L (ref 35–104)
ALT SERPL-CCNC: 71 U/L (ref 10–35)
ANION GAP SERPL CALCULATED.3IONS-SCNC: 11 MMOL/L (ref 9–16)
AST SERPL-CCNC: 133 U/L (ref 10–35)
BASOPHILS # BLD: 0.05 K/UL (ref 0–0.2)
BASOPHILS NFR BLD: 0 % (ref 0–2)
BILIRUB SERPL-MCNC: 0.8 MG/DL (ref 0–1.2)
BUN SERPL-MCNC: 16 MG/DL (ref 8–23)
CALCIUM SERPL-MCNC: 9 MG/DL (ref 8.6–10.4)
CHLORIDE SERPL-SCNC: 104 MMOL/L (ref 98–107)
CO2 SERPL-SCNC: 22 MMOL/L (ref 20–31)
CREAT SERPL-MCNC: 0.7 MG/DL (ref 0.6–0.9)
EOSINOPHIL # BLD: 0.06 K/UL (ref 0–0.44)
EOSINOPHILS RELATIVE PERCENT: 1 % (ref 1–4)
ERYTHROCYTE [DISTWIDTH] IN BLOOD BY AUTOMATED COUNT: 14 % (ref 11.8–14.4)
GFR, ESTIMATED: 88 ML/MIN/1.73M2
GLUCOSE BLD-MCNC: 102 MG/DL (ref 65–105)
GLUCOSE BLD-MCNC: 113 MG/DL (ref 65–105)
GLUCOSE BLD-MCNC: 162 MG/DL (ref 65–105)
GLUCOSE BLD-MCNC: 162 MG/DL (ref 65–105)
GLUCOSE SERPL-MCNC: 130 MG/DL (ref 74–99)
HCT VFR BLD AUTO: 26 % (ref 36.3–47.1)
HGB BLD-MCNC: 8.5 G/DL (ref 11.9–15.1)
IMM GRANULOCYTES # BLD AUTO: 0.34 K/UL (ref 0–0.3)
IMM GRANULOCYTES NFR BLD: 3 %
LYMPHOCYTES NFR BLD: 0.9 K/UL (ref 1.1–3.7)
LYMPHOCYTES RELATIVE PERCENT: 7 % (ref 24–43)
MAGNESIUM SERPL-MCNC: 2.2 MG/DL (ref 1.6–2.4)
MCH RBC QN AUTO: 31 PG (ref 25.2–33.5)
MCHC RBC AUTO-ENTMCNC: 32.7 G/DL (ref 28.4–34.8)
MCV RBC AUTO: 94.9 FL (ref 82.6–102.9)
MICROORGANISM SPEC CULT: NORMAL
MONOCYTES NFR BLD: 0.95 K/UL (ref 0.1–1.2)
MONOCYTES NFR BLD: 8 % (ref 3–12)
NEUTROPHILS NFR BLD: 81 % (ref 36–65)
NEUTS SEG NFR BLD: 10.16 K/UL (ref 1.5–8.1)
NRBC BLD-RTO: 0 PER 100 WBC
PLATELET # BLD AUTO: 417 K/UL (ref 138–453)
PMV BLD AUTO: 8.9 FL (ref 8.1–13.5)
POTASSIUM SERPL-SCNC: 3.3 MMOL/L (ref 3.7–5.3)
PROT SERPL-MCNC: 6.7 G/DL (ref 6.6–8.7)
RBC # BLD AUTO: 2.74 M/UL (ref 3.95–5.11)
SERVICE CMNT-IMP: NORMAL
SODIUM SERPL-SCNC: 137 MMOL/L (ref 136–145)
SPECIMEN DESCRIPTION: NORMAL
WBC OTHER # BLD: 12.5 K/UL (ref 3.5–11.3)

## 2025-07-06 PROCEDURE — G0545 PR INHERENT VISIT TO INPT: HCPCS | Performed by: INTERNAL MEDICINE

## 2025-07-06 PROCEDURE — 6370000000 HC RX 637 (ALT 250 FOR IP): Performed by: STUDENT IN AN ORGANIZED HEALTH CARE EDUCATION/TRAINING PROGRAM

## 2025-07-06 PROCEDURE — 51798 US URINE CAPACITY MEASURE: CPT

## 2025-07-06 PROCEDURE — 80053 COMPREHEN METABOLIC PANEL: CPT

## 2025-07-06 PROCEDURE — 6360000002 HC RX W HCPCS: Performed by: INTERNAL MEDICINE

## 2025-07-06 PROCEDURE — 82947 ASSAY GLUCOSE BLOOD QUANT: CPT

## 2025-07-06 PROCEDURE — 6360000002 HC RX W HCPCS: Performed by: NURSE PRACTITIONER

## 2025-07-06 PROCEDURE — 2500000003 HC RX 250 WO HCPCS: Performed by: STUDENT IN AN ORGANIZED HEALTH CARE EDUCATION/TRAINING PROGRAM

## 2025-07-06 PROCEDURE — 6370000000 HC RX 637 (ALT 250 FOR IP)

## 2025-07-06 PROCEDURE — 2500000003 HC RX 250 WO HCPCS: Performed by: NURSE PRACTITIONER

## 2025-07-06 PROCEDURE — 2580000003 HC RX 258: Performed by: INTERNAL MEDICINE

## 2025-07-06 PROCEDURE — 99232 SBSQ HOSP IP/OBS MODERATE 35: CPT | Performed by: STUDENT IN AN ORGANIZED HEALTH CARE EDUCATION/TRAINING PROGRAM

## 2025-07-06 PROCEDURE — 6360000002 HC RX W HCPCS: Performed by: STUDENT IN AN ORGANIZED HEALTH CARE EDUCATION/TRAINING PROGRAM

## 2025-07-06 PROCEDURE — 99231 SBSQ HOSP IP/OBS SF/LOW 25: CPT | Performed by: INTERNAL MEDICINE

## 2025-07-06 PROCEDURE — 1200000000 HC SEMI PRIVATE

## 2025-07-06 PROCEDURE — 83735 ASSAY OF MAGNESIUM: CPT

## 2025-07-06 PROCEDURE — 99232 SBSQ HOSP IP/OBS MODERATE 35: CPT | Performed by: INTERNAL MEDICINE

## 2025-07-06 PROCEDURE — 36415 COLL VENOUS BLD VENIPUNCTURE: CPT

## 2025-07-06 PROCEDURE — 6370000000 HC RX 637 (ALT 250 FOR IP): Performed by: HOSPITALIST

## 2025-07-06 PROCEDURE — 85025 COMPLETE CBC W/AUTO DIFF WBC: CPT

## 2025-07-06 RX ORDER — OXYCODONE HYDROCHLORIDE 5 MG/1
2.5 TABLET ORAL EVERY 4 HOURS PRN
Refills: 0 | Status: DISCONTINUED | OUTPATIENT
Start: 2025-07-06 | End: 2025-07-06

## 2025-07-06 RX ORDER — OXYCODONE HYDROCHLORIDE 5 MG/1
5 TABLET ORAL EVERY 4 HOURS PRN
Refills: 0 | Status: DISCONTINUED | OUTPATIENT
Start: 2025-07-06 | End: 2025-07-08

## 2025-07-06 RX ADMIN — MICAFUNGIN SODIUM 100 MG: 100 INJECTION, POWDER, LYOPHILIZED, FOR SOLUTION INTRAVENOUS at 11:00

## 2025-07-06 RX ADMIN — METOPROLOL SUCCINATE 12.5 MG: 25 TABLET, FILM COATED, EXTENDED RELEASE ORAL at 10:45

## 2025-07-06 RX ADMIN — SERTRALINE 100 MG: 50 TABLET, FILM COATED ORAL at 10:46

## 2025-07-06 RX ADMIN — Medication 2000 MG: at 02:02

## 2025-07-06 RX ADMIN — OXYCODONE 5 MG: 5 TABLET ORAL at 16:52

## 2025-07-06 RX ADMIN — SODIUM CHLORIDE, PRESERVATIVE FREE 10 ML: 5 INJECTION INTRAVENOUS at 18:33

## 2025-07-06 RX ADMIN — QUETIAPINE FUMARATE 25 MG: 25 TABLET ORAL at 23:32

## 2025-07-06 RX ADMIN — Medication 2000 MG: at 18:33

## 2025-07-06 RX ADMIN — OXYCODONE 2.5 MG: 5 TABLET ORAL at 11:17

## 2025-07-06 RX ADMIN — SODIUM CHLORIDE, PRESERVATIVE FREE 40 MG: 5 INJECTION INTRAVENOUS at 09:30

## 2025-07-06 RX ADMIN — SODIUM CHLORIDE, PRESERVATIVE FREE 40 MG: 5 INJECTION INTRAVENOUS at 21:54

## 2025-07-06 RX ADMIN — ACETAMINOPHEN 1000 MG: 500 TABLET ORAL at 14:02

## 2025-07-06 RX ADMIN — SODIUM CHLORIDE, PRESERVATIVE FREE 10 ML: 5 INJECTION INTRAVENOUS at 21:53

## 2025-07-06 RX ADMIN — Medication 2000 MG: at 09:00

## 2025-07-06 RX ADMIN — ACETAMINOPHEN 1000 MG: 500 TABLET ORAL at 04:04

## 2025-07-06 ASSESSMENT — PAIN DESCRIPTION - DESCRIPTORS
DESCRIPTORS: ACHING

## 2025-07-06 ASSESSMENT — PAIN - FUNCTIONAL ASSESSMENT: PAIN_FUNCTIONAL_ASSESSMENT: PREVENTS OR INTERFERES SOME ACTIVE ACTIVITIES AND ADLS

## 2025-07-06 ASSESSMENT — PAIN DESCRIPTION - LOCATION
LOCATION: BACK

## 2025-07-06 ASSESSMENT — PAIN DESCRIPTION - ORIENTATION
ORIENTATION: LOWER
ORIENTATION: LEFT
ORIENTATION: LOWER

## 2025-07-06 ASSESSMENT — PAIN SCALES - GENERAL
PAINLEVEL_OUTOF10: 4
PAINLEVEL_OUTOF10: 7

## 2025-07-06 NOTE — CARE COORDINATION
Case Management   Daily Progress Note       Patient Name: Urvashi Tse                   YOB: 1945  Diagnosis: Epidural abscess [G06.2]  Transaminitis [R74.01]  Vertebral osteomyelitis (HCC) [M46.20]                       GMLOS: 4.9 days  Length of Stay: 5  days    Anticipated Discharge Date: One day until discharge    Readmission Risk (Low < 19, Mod (19-27), High > 27): Readmission Risk Score: 15.7        Current Transitional Plan    [x] Home Independently    [] Home with HC    [] Skilled Nursing Facility    [] Acute Rehabilitation    [] Long Term Acute Care (LTAC)    [] Other:     Plan for the Stay (Medical Management) :          Workflow Continuation (Additional Notes) :  Home with family support declines hc      Julissa Veloz RN  July 6, 2025

## 2025-07-07 ENCOUNTER — ANESTHESIA EVENT (OUTPATIENT)
Dept: OPERATING ROOM | Age: 80
DRG: 853 | End: 2025-07-07
Payer: MEDICARE

## 2025-07-07 ENCOUNTER — ANESTHESIA (OUTPATIENT)
Dept: OPERATING ROOM | Age: 80
DRG: 853 | End: 2025-07-07
Payer: MEDICARE

## 2025-07-07 PROBLEM — N17.9 AKI (ACUTE KIDNEY INJURY): Status: ACTIVE | Noted: 2025-07-07

## 2025-07-07 PROBLEM — D64.9 ANEMIA: Status: ACTIVE | Noted: 2025-07-07

## 2025-07-07 LAB
ABO + RH BLD: NORMAL
ALBUMIN SERPL-MCNC: 2.4 G/DL (ref 3.5–5.2)
ALBUMIN/GLOB SERPL: 0.6 {RATIO} (ref 1–2.5)
ALP SERPL-CCNC: 404 U/L (ref 35–104)
ALT SERPL-CCNC: 43 U/L (ref 10–35)
ANION GAP SERPL CALCULATED.3IONS-SCNC: 8 MMOL/L (ref 9–16)
ARM BAND NUMBER: NORMAL
AST SERPL-CCNC: 81 U/L (ref 10–35)
BASOPHILS # BLD: <0.03 K/UL (ref 0–0.2)
BASOPHILS NFR BLD: 0 % (ref 0–2)
BILIRUB SERPL-MCNC: 0.6 MG/DL (ref 0–1.2)
BLOOD BANK SAMPLE EXPIRATION: NORMAL
BLOOD GROUP ANTIBODIES SERPL: NEGATIVE
BUN SERPL-MCNC: 15 MG/DL (ref 8–23)
CALCIUM SERPL-MCNC: 8.6 MG/DL (ref 8.6–10.4)
CHLORIDE SERPL-SCNC: 100 MMOL/L (ref 98–107)
CO2 SERPL-SCNC: 25 MMOL/L (ref 20–31)
CREAT SERPL-MCNC: 0.6 MG/DL (ref 0.6–0.9)
EOSINOPHIL # BLD: 0.07 K/UL (ref 0–0.44)
EOSINOPHILS RELATIVE PERCENT: 1 % (ref 1–4)
ERYTHROCYTE [DISTWIDTH] IN BLOOD BY AUTOMATED COUNT: 13.9 % (ref 11.8–14.4)
GFR, ESTIMATED: >90 ML/MIN/1.73M2
GLUCOSE BLD-MCNC: 102 MG/DL (ref 65–105)
GLUCOSE BLD-MCNC: 119 MG/DL (ref 65–105)
GLUCOSE BLD-MCNC: 128 MG/DL (ref 65–105)
GLUCOSE BLD-MCNC: 167 MG/DL (ref 65–105)
GLUCOSE BLD-MCNC: 94 MG/DL (ref 65–105)
GLUCOSE SERPL-MCNC: 119 MG/DL (ref 74–99)
HCT VFR BLD AUTO: 22.5 % (ref 36.3–47.1)
HCT VFR BLD AUTO: 27.3 % (ref 36.3–47.1)
HGB BLD-MCNC: 7.5 G/DL (ref 11.9–15.1)
HGB BLD-MCNC: 9.2 G/DL (ref 11.9–15.1)
IMM GRANULOCYTES # BLD AUTO: 0.22 K/UL (ref 0–0.3)
IMM GRANULOCYTES NFR BLD: 2 %
INR PPP: 1.1
LYMPHOCYTES NFR BLD: 1 K/UL (ref 1.1–3.7)
LYMPHOCYTES RELATIVE PERCENT: 9 % (ref 24–43)
MCH RBC QN AUTO: 31.1 PG (ref 25.2–33.5)
MCHC RBC AUTO-ENTMCNC: 33.3 G/DL (ref 28.4–34.8)
MCV RBC AUTO: 93.4 FL (ref 82.6–102.9)
MONOCYTES NFR BLD: 0.97 K/UL (ref 0.1–1.2)
MONOCYTES NFR BLD: 9 % (ref 3–12)
NEUTROPHILS NFR BLD: 79 % (ref 36–65)
NEUTS SEG NFR BLD: 8.74 K/UL (ref 1.5–8.1)
NRBC BLD-RTO: 0 PER 100 WBC
PLATELET # BLD AUTO: 339 K/UL (ref 138–453)
PMV BLD AUTO: 8.4 FL (ref 8.1–13.5)
POTASSIUM SERPL-SCNC: 3.4 MMOL/L (ref 3.7–5.3)
PROT SERPL-MCNC: 6.3 G/DL (ref 6.6–8.7)
PROTHROMBIN TIME: 14.8 SEC (ref 11.7–14.9)
RBC # BLD AUTO: 2.41 M/UL (ref 3.95–5.11)
SODIUM SERPL-SCNC: 133 MMOL/L (ref 136–145)
SURGICAL PATHOLOGY REPORT: NORMAL
WBC OTHER # BLD: 11 K/UL (ref 3.5–11.3)

## 2025-07-07 PROCEDURE — 2500000003 HC RX 250 WO HCPCS

## 2025-07-07 PROCEDURE — 86900 BLOOD TYPING SEROLOGIC ABO: CPT

## 2025-07-07 PROCEDURE — 86850 RBC ANTIBODY SCREEN: CPT

## 2025-07-07 PROCEDURE — 82947 ASSAY GLUCOSE BLOOD QUANT: CPT

## 2025-07-07 PROCEDURE — 6370000000 HC RX 637 (ALT 250 FOR IP): Performed by: STUDENT IN AN ORGANIZED HEALTH CARE EDUCATION/TRAINING PROGRAM

## 2025-07-07 PROCEDURE — 6370000000 HC RX 637 (ALT 250 FOR IP): Performed by: HOSPITALIST

## 2025-07-07 PROCEDURE — 85018 HEMOGLOBIN: CPT

## 2025-07-07 PROCEDURE — 3700000000 HC ANESTHESIA ATTENDED CARE: Performed by: SURGERY

## 2025-07-07 PROCEDURE — 2500000003 HC RX 250 WO HCPCS: Performed by: STUDENT IN AN ORGANIZED HEALTH CARE EDUCATION/TRAINING PROGRAM

## 2025-07-07 PROCEDURE — 3600000014 HC SURGERY LEVEL 4 ADDTL 15MIN: Performed by: SURGERY

## 2025-07-07 PROCEDURE — 6370000000 HC RX 637 (ALT 250 FOR IP)

## 2025-07-07 PROCEDURE — 6360000002 HC RX W HCPCS: Performed by: INTERNAL MEDICINE

## 2025-07-07 PROCEDURE — 86901 BLOOD TYPING SEROLOGIC RH(D): CPT

## 2025-07-07 PROCEDURE — 6360000002 HC RX W HCPCS

## 2025-07-07 PROCEDURE — 2500000003 HC RX 250 WO HCPCS: Performed by: INTERNAL MEDICINE

## 2025-07-07 PROCEDURE — 99232 SBSQ HOSP IP/OBS MODERATE 35: CPT | Performed by: STUDENT IN AN ORGANIZED HEALTH CARE EDUCATION/TRAINING PROGRAM

## 2025-07-07 PROCEDURE — 3600000004 HC SURGERY LEVEL 4 BASE: Performed by: SURGERY

## 2025-07-07 PROCEDURE — 85025 COMPLETE CBC W/AUTO DIFF WBC: CPT

## 2025-07-07 PROCEDURE — 36415 COLL VENOUS BLD VENIPUNCTURE: CPT

## 2025-07-07 PROCEDURE — 99232 SBSQ HOSP IP/OBS MODERATE 35: CPT | Performed by: NURSE PRACTITIONER

## 2025-07-07 PROCEDURE — 51798 US URINE CAPACITY MEASURE: CPT

## 2025-07-07 PROCEDURE — 99232 SBSQ HOSP IP/OBS MODERATE 35: CPT | Performed by: INTERNAL MEDICINE

## 2025-07-07 PROCEDURE — 2500000003 HC RX 250 WO HCPCS: Performed by: NURSE PRACTITIONER

## 2025-07-07 PROCEDURE — C1894 INTRO/SHEATH, NON-LASER: HCPCS | Performed by: SURGERY

## 2025-07-07 PROCEDURE — 7100000000 HC PACU RECOVERY - FIRST 15 MIN: Performed by: SURGERY

## 2025-07-07 PROCEDURE — 2709999900 HC NON-CHARGEABLE SUPPLY: Performed by: SURGERY

## 2025-07-07 PROCEDURE — 6360000002 HC RX W HCPCS: Performed by: NURSE PRACTITIONER

## 2025-07-07 PROCEDURE — 1200000000 HC SEMI PRIVATE

## 2025-07-07 PROCEDURE — 88342 IMHCHEM/IMCYTCHM 1ST ANTB: CPT

## 2025-07-07 PROCEDURE — 88304 TISSUE EXAM BY PATHOLOGIST: CPT

## 2025-07-07 PROCEDURE — 0FT44ZZ RESECTION OF GALLBLADDER, PERCUTANEOUS ENDOSCOPIC APPROACH: ICD-10-PCS | Performed by: SURGERY

## 2025-07-07 PROCEDURE — 7100000001 HC PACU RECOVERY - ADDTL 15 MIN: Performed by: SURGERY

## 2025-07-07 PROCEDURE — 2500000003 HC RX 250 WO HCPCS: Performed by: SURGERY

## 2025-07-07 PROCEDURE — 2580000003 HC RX 258: Performed by: INTERNAL MEDICINE

## 2025-07-07 PROCEDURE — 80053 COMPREHEN METABOLIC PANEL: CPT

## 2025-07-07 PROCEDURE — 2580000003 HC RX 258

## 2025-07-07 PROCEDURE — 88305 TISSUE EXAM BY PATHOLOGIST: CPT

## 2025-07-07 PROCEDURE — 0DB68ZX EXCISION OF STOMACH, VIA NATURAL OR ARTIFICIAL OPENING ENDOSCOPIC, DIAGNOSTIC: ICD-10-PCS | Performed by: INTERNAL MEDICINE

## 2025-07-07 PROCEDURE — 6360000002 HC RX W HCPCS: Performed by: STUDENT IN AN ORGANIZED HEALTH CARE EDUCATION/TRAINING PROGRAM

## 2025-07-07 PROCEDURE — 85610 PROTHROMBIN TIME: CPT

## 2025-07-07 PROCEDURE — 85014 HEMATOCRIT: CPT

## 2025-07-07 PROCEDURE — 3700000001 HC ADD 15 MINUTES (ANESTHESIA): Performed by: SURGERY

## 2025-07-07 RX ORDER — SODIUM CHLORIDE 0.9 % (FLUSH) 0.9 %
5-40 SYRINGE (ML) INJECTION PRN
Status: DISCONTINUED | OUTPATIENT
Start: 2025-07-07 | End: 2025-07-07 | Stop reason: HOSPADM

## 2025-07-07 RX ORDER — POTASSIUM CHLORIDE 7.45 MG/ML
10 INJECTION INTRAVENOUS
Status: DISPENSED | OUTPATIENT
Start: 2025-07-07 | End: 2025-07-07

## 2025-07-07 RX ORDER — ROCURONIUM BROMIDE 10 MG/ML
INJECTION, SOLUTION INTRAVENOUS
Status: DISCONTINUED | OUTPATIENT
Start: 2025-07-07 | End: 2025-07-07 | Stop reason: SDUPTHER

## 2025-07-07 RX ORDER — SODIUM CHLORIDE 0.9 % (FLUSH) 0.9 %
5-40 SYRINGE (ML) INJECTION EVERY 12 HOURS SCHEDULED
Status: DISCONTINUED | OUTPATIENT
Start: 2025-07-07 | End: 2025-07-07 | Stop reason: HOSPADM

## 2025-07-07 RX ORDER — BUPIVACAINE HYDROCHLORIDE AND EPINEPHRINE 5; 5 MG/ML; UG/ML
INJECTION, SOLUTION EPIDURAL; INTRACAUDAL; PERINEURAL PRN
Status: DISCONTINUED | OUTPATIENT
Start: 2025-07-07 | End: 2025-07-07 | Stop reason: HOSPADM

## 2025-07-07 RX ORDER — SODIUM CHLORIDE 9 MG/ML
INJECTION, SOLUTION INTRAVENOUS PRN
Status: DISCONTINUED | OUTPATIENT
Start: 2025-07-07 | End: 2025-07-07 | Stop reason: HOSPADM

## 2025-07-07 RX ORDER — DROPERIDOL 2.5 MG/ML
0.62 INJECTION, SOLUTION INTRAMUSCULAR; INTRAVENOUS
Status: DISCONTINUED | OUTPATIENT
Start: 2025-07-07 | End: 2025-07-07 | Stop reason: HOSPADM

## 2025-07-07 RX ORDER — MEPERIDINE HYDROCHLORIDE 50 MG/ML
12.5 INJECTION INTRAMUSCULAR; INTRAVENOUS; SUBCUTANEOUS EVERY 5 MIN PRN
Status: DISCONTINUED | OUTPATIENT
Start: 2025-07-07 | End: 2025-07-07 | Stop reason: HOSPADM

## 2025-07-07 RX ORDER — DIPHENHYDRAMINE HYDROCHLORIDE 50 MG/ML
12.5 INJECTION, SOLUTION INTRAMUSCULAR; INTRAVENOUS
Status: DISCONTINUED | OUTPATIENT
Start: 2025-07-07 | End: 2025-07-07 | Stop reason: HOSPADM

## 2025-07-07 RX ORDER — SODIUM CHLORIDE, SODIUM LACTATE, POTASSIUM CHLORIDE, CALCIUM CHLORIDE 600; 310; 30; 20 MG/100ML; MG/100ML; MG/100ML; MG/100ML
INJECTION, SOLUTION INTRAVENOUS
Status: DISCONTINUED | OUTPATIENT
Start: 2025-07-07 | End: 2025-07-07 | Stop reason: SDUPTHER

## 2025-07-07 RX ORDER — DEXAMETHASONE SODIUM PHOSPHATE 10 MG/ML
INJECTION, SOLUTION INTRA-ARTICULAR; INTRALESIONAL; INTRAMUSCULAR; INTRAVENOUS; SOFT TISSUE
Status: DISCONTINUED | OUTPATIENT
Start: 2025-07-07 | End: 2025-07-07 | Stop reason: SDUPTHER

## 2025-07-07 RX ORDER — HYDRALAZINE HYDROCHLORIDE 20 MG/ML
10 INJECTION INTRAMUSCULAR; INTRAVENOUS
Status: DISCONTINUED | OUTPATIENT
Start: 2025-07-07 | End: 2025-07-07 | Stop reason: HOSPADM

## 2025-07-07 RX ORDER — MAGNESIUM HYDROXIDE 1200 MG/15ML
LIQUID ORAL CONTINUOUS PRN
Status: DISCONTINUED | OUTPATIENT
Start: 2025-07-07 | End: 2025-07-07 | Stop reason: HOSPADM

## 2025-07-07 RX ORDER — ONDANSETRON 2 MG/ML
INJECTION INTRAMUSCULAR; INTRAVENOUS
Status: DISCONTINUED | OUTPATIENT
Start: 2025-07-07 | End: 2025-07-07 | Stop reason: SDUPTHER

## 2025-07-07 RX ORDER — FENTANYL CITRATE 50 UG/ML
INJECTION, SOLUTION INTRAMUSCULAR; INTRAVENOUS
Status: DISCONTINUED | OUTPATIENT
Start: 2025-07-07 | End: 2025-07-07 | Stop reason: SDUPTHER

## 2025-07-07 RX ORDER — LIDOCAINE HYDROCHLORIDE 10 MG/ML
INJECTION, SOLUTION EPIDURAL; INFILTRATION; INTRACAUDAL; PERINEURAL
Status: DISCONTINUED | OUTPATIENT
Start: 2025-07-07 | End: 2025-07-07 | Stop reason: SDUPTHER

## 2025-07-07 RX ORDER — PROPOFOL 10 MG/ML
INJECTION, EMULSION INTRAVENOUS
Status: DISCONTINUED | OUTPATIENT
Start: 2025-07-07 | End: 2025-07-07 | Stop reason: SDUPTHER

## 2025-07-07 RX ORDER — METOCLOPRAMIDE HYDROCHLORIDE 5 MG/ML
10 INJECTION INTRAMUSCULAR; INTRAVENOUS
Status: DISCONTINUED | OUTPATIENT
Start: 2025-07-07 | End: 2025-07-07 | Stop reason: HOSPADM

## 2025-07-07 RX ORDER — PHENYLEPHRINE HCL IN 0.9% NACL 1 MG/10 ML
SYRINGE (ML) INTRAVENOUS
Status: DISCONTINUED | OUTPATIENT
Start: 2025-07-07 | End: 2025-07-07 | Stop reason: SDUPTHER

## 2025-07-07 RX ADMIN — SODIUM CHLORIDE, PRESERVATIVE FREE 10 ML: 5 INJECTION INTRAVENOUS at 09:39

## 2025-07-07 RX ADMIN — ACETAMINOPHEN 1000 MG: 500 TABLET ORAL at 21:00

## 2025-07-07 RX ADMIN — Medication 2000 MG: at 17:24

## 2025-07-07 RX ADMIN — SUGAMMADEX 200 MG: 100 INJECTION, SOLUTION INTRAVENOUS at 13:31

## 2025-07-07 RX ADMIN — POTASSIUM CHLORIDE 10 MEQ: 7.46 INJECTION, SOLUTION INTRAVENOUS at 14:59

## 2025-07-07 RX ADMIN — Medication 50 MCG: at 12:42

## 2025-07-07 RX ADMIN — ROCURONIUM BROMIDE 15 MG: 10 INJECTION, SOLUTION INTRAVENOUS at 13:19

## 2025-07-07 RX ADMIN — POTASSIUM CHLORIDE 10 MEQ: 7.46 INJECTION, SOLUTION INTRAVENOUS at 18:48

## 2025-07-07 RX ADMIN — POTASSIUM CHLORIDE 10 MEQ: 7.46 INJECTION, SOLUTION INTRAVENOUS at 17:26

## 2025-07-07 RX ADMIN — Medication 2000 MG: at 02:10

## 2025-07-07 RX ADMIN — POTASSIUM CHLORIDE 10 MEQ: 7.46 INJECTION, SOLUTION INTRAVENOUS at 15:50

## 2025-07-07 RX ADMIN — Medication 50 MCG: at 13:29

## 2025-07-07 RX ADMIN — PROPOFOL 120 MG: 10 INJECTION, EMULSION INTRAVENOUS at 11:46

## 2025-07-07 RX ADMIN — FENTANYL CITRATE 100 MCG: 50 INJECTION, SOLUTION INTRAMUSCULAR; INTRAVENOUS at 12:45

## 2025-07-07 RX ADMIN — METOPROLOL SUCCINATE 12.5 MG: 25 TABLET, FILM COATED, EXTENDED RELEASE ORAL at 09:39

## 2025-07-07 RX ADMIN — OXYCODONE 5 MG: 5 TABLET ORAL at 02:16

## 2025-07-07 RX ADMIN — ONDANSETRON 4 MG: 2 INJECTION, SOLUTION INTRAMUSCULAR; INTRAVENOUS at 13:26

## 2025-07-07 RX ADMIN — ROCURONIUM BROMIDE 50 MG: 10 INJECTION, SOLUTION INTRAVENOUS at 11:46

## 2025-07-07 RX ADMIN — SODIUM CHLORIDE, PRESERVATIVE FREE 40 MG: 5 INJECTION INTRAVENOUS at 09:39

## 2025-07-07 RX ADMIN — SERTRALINE 100 MG: 50 TABLET, FILM COATED ORAL at 09:40

## 2025-07-07 RX ADMIN — MICAFUNGIN SODIUM 100 MG: 100 INJECTION, POWDER, LYOPHILIZED, FOR SOLUTION INTRAVENOUS at 09:53

## 2025-07-07 RX ADMIN — LIDOCAINE HYDROCHLORIDE 50 MG: 10 INJECTION, SOLUTION EPIDURAL; INFILTRATION; INTRACAUDAL; PERINEURAL at 11:46

## 2025-07-07 RX ADMIN — SODIUM CHLORIDE, PRESERVATIVE FREE 10 ML: 5 INJECTION INTRAVENOUS at 21:01

## 2025-07-07 RX ADMIN — ROCURONIUM BROMIDE 20 MG: 10 INJECTION, SOLUTION INTRAVENOUS at 12:42

## 2025-07-07 RX ADMIN — QUETIAPINE FUMARATE 25 MG: 25 TABLET ORAL at 21:00

## 2025-07-07 RX ADMIN — SODIUM CHLORIDE, PRESERVATIVE FREE 40 MG: 5 INJECTION INTRAVENOUS at 21:00

## 2025-07-07 RX ADMIN — SODIUM CHLORIDE, POTASSIUM CHLORIDE, SODIUM LACTATE AND CALCIUM CHLORIDE: 600; 310; 30; 20 INJECTION, SOLUTION INTRAVENOUS at 11:57

## 2025-07-07 RX ADMIN — DEXAMETHASONE SODIUM PHOSPHATE 5 MG: 10 INJECTION INTRAMUSCULAR; INTRAVENOUS at 11:53

## 2025-07-07 RX ADMIN — Medication 50 MCG: at 12:14

## 2025-07-07 RX ADMIN — Medication 2000 MG: at 09:39

## 2025-07-07 RX ADMIN — FENTANYL CITRATE 50 MCG: 50 INJECTION, SOLUTION INTRAMUSCULAR; INTRAVENOUS at 13:20

## 2025-07-07 ASSESSMENT — ENCOUNTER SYMPTOMS
RESPIRATORY NEGATIVE: 1
GASTROINTESTINAL NEGATIVE: 1

## 2025-07-07 ASSESSMENT — PAIN SCALES - GENERAL
PAINLEVEL_OUTOF10: 7
PAINLEVEL_OUTOF10: 1
PAINLEVEL_OUTOF10: 1
PAINLEVEL_OUTOF10: 0

## 2025-07-07 ASSESSMENT — PAIN DESCRIPTION - ORIENTATION
ORIENTATION: LOWER
ORIENTATION: LOWER

## 2025-07-07 ASSESSMENT — PAIN DESCRIPTION - DESCRIPTORS
DESCRIPTORS: DISCOMFORT;SORE
DESCRIPTORS: DISCOMFORT
DESCRIPTORS: ACHING

## 2025-07-07 ASSESSMENT — PAIN DESCRIPTION - LOCATION
LOCATION: BACK
LOCATION: ABDOMEN

## 2025-07-07 ASSESSMENT — PAIN - FUNCTIONAL ASSESSMENT
PAIN_FUNCTIONAL_ASSESSMENT: PREVENTS OR INTERFERES SOME ACTIVE ACTIVITIES AND ADLS
PAIN_FUNCTIONAL_ASSESSMENT: NONE - DENIES PAIN

## 2025-07-07 NOTE — ANESTHESIA PRE PROCEDURE
Choledocholithiasis K80.50   • Acute hepatitis B17.9   • Transaminitis R74.01   • Septic embolism (HCC) I76       Past Medical History:        Diagnosis Date   • COPD (chronic obstructive pulmonary disease) (HCC)    • Diabetes mellitus (HCC)    • Hyperlipidemia    • Hypertension    • Lichen planus        Past Surgical History:        Procedure Laterality Date   • COLONOSCOPY  03/21/2016    -hemorrhoids   • CT ABSCESS DRAINAGE  7/2/2025    CT ABSCESS DRAINAGE 7/2/2025 [facility] CT SCAN   • DENTAL SURGERY     • JOINT REPLACEMENT      right total knee   • LITHOTRIPSY         Social History:    Social History     Tobacco Use   • Smoking status: Former     Current packs/day: 0.00     Average packs/day: 0.5 packs/day for 30.0 years (15.0 ttl pk-yrs)     Types: Cigarettes     Start date: 1/1/1985     Quit date: 1/1/2015     Years since quitting: 10.5   • Smokeless tobacco: Never   Substance Use Topics   • Alcohol use: Not Currently                                Counseling given: Not Answered      Vital Signs (Current):   Vitals:    07/06/25 1945 07/07/25 0246 07/07/25 0600 07/07/25 0815   BP: (!) 149/67   (!) 136/94   Pulse: 68   82   Resp: 17 17  17   Temp: 99.1 °F (37.3 °C)   98.8 °F (37.1 °C)   TempSrc: Oral   Oral   SpO2: 99%   98%   Weight:   58.9 kg (129 lb 13.6 oz)    Height:                                                  BP Readings from Last 3 Encounters:   07/07/25 (!) 136/94   06/30/25 (!) 141/61   12/19/23 130/75       NPO Status:                                                                                 BMI:   Wt Readings from Last 3 Encounters:   07/07/25 58.9 kg (129 lb 13.6 oz)   06/30/25 57.6 kg (127 lb)   04/18/23 62.6 kg (138 lb)     Body mass index is 23 kg/m².    CBC:   Lab Results   Component Value Date/Time    WBC 11.0 07/07/2025 05:00 AM    RBC 2.41 07/07/2025 05:00 AM    HGB 7.5 07/07/2025 05:00 AM    HCT 22.5 07/07/2025 05:00 AM    MCV 93.4 07/07/2025 05:00 AM    RDW 13.9 07/07/2025

## 2025-07-07 NOTE — CARE COORDINATION
Urvashi Tse  [id number]  meets criteria for hypertension education. The following resources and interventions were provided: Diabetes Flyer education and Hypertension Flyer education

## 2025-07-07 NOTE — OP NOTE
PROCEDURE NOTE    DATE OF PROCEDURE: 7/7/2025     SURGEON: Jonathan Castro MD  Facility: [facility]  ASSISTANT: None  Anesthesia: Monitored anesthesia care  PREOPERATIVE DIAGNOSIS: Cholelithiasis with dilated CBD and abnormal LFT's    Diagnosis:    POSTOPERATIVE DIAGNOSIS: As described below    OPERATION: Upper GI endoscopy with Endoscopic ultrasound with FNA    ANESTHESIA: Moderate Sedation     ESTIMATED BLOOD LOSS: Less than 50 ml    COMPLICATIONS: None.     SPECIMENS:  Was Obtained: gastric mucosa biopsy    HISTORY: The patient is a 80 y.o. year old female with history of above preop diagnosis.  I recommended esophagogastroduodenoscopy with possible biopsy and I explained the risk, benefits, expected outcome, and alternatives to the procedure.  Risks included but are not limited to bleeding, infection, respiratory distress, hypotension, and perforation of the esophagus, stomach, or duodenum.  Patient understands and is in agreement.      PROCEDURE: The patient was given IV conscious sedation.  The patient's SPO2 remained above 90% throughout the procedure.The gastroscope was inserted orally and advanced under direct vision through the esophagus, through the stomach, through the pylorus, and into the descending duodenum.      Post sedation note :The patient's SPO2 remained above 90% throughout the procedure.the vital signs remained stable , and no immediate complication form the procedure noted, patient will be ready for d/c when criteria is met .      Findings:    Retropharyngeal area was grossly normal appearing    Esophagus: normal    Esophagogastric markings: Diaphragmatic hiatus- 37 cm; GE junction- 37 cm; Squamo-columnar junction- 37 cm    Stomach:    Fundus: normal    Body: normal    Antrum: abnormal: few erosions; biopsy obtained    Duodenum:     Descending: normal    Bulb: normal    The scope was removed and the patient tolerated the procedure well.     With the patient lying in left lateral position, Olymp[employer] 
operating theater and placed in supine position.  GET induced, and the patient was prepped and draped in the usual sterile fashion.  Timeout performed.      Supraumbilical incision made with 11 blade scalpel.  Umbilical stalk grasped and elevated with penetrating towel clamp, and Veress needle inserted.  After positive saline drop test, the abdomen was insufflated without incident.      A 5mm supraumbilical optiview port was then inserted, and no inadvertent bowel injury was noted upon entry.  A 12mm subxiphoid port and two 5mm right subcostal ports were placed under direct visualization.      The gallbladder was identified in the right upper quadrant and noted to be mildly inflamed. The gallbladder was elevated.  The triangle of Calot was dissected, and the cystic duct and artery were clearly identified and a critical view of safety was obtained.  Three clips were placed distally and one clip proximally on the cystic duct, and two clips proximally and one distally on the cystic artery, then these were transected sharply with endoshears.  The gallbladder was then carefully dissected off the liver bed with a mixture of Bovie cautery and suction dissection.  The gallbladder was placed in an endocatch bag.  The gallbladder fossa was irrigated and hemostasis was obtained.  The gallbladder was removed via the subxiphoid port site.       The subxiphoid fascia was closed with 0 Vicryl suture, and the remaining ports were removed under direct visualization.  The skin was closed with 4-0 Monocryl suture and dermabond.      The patient was awakened from anesthesia and extubated without incident.  The patient tolerated the procedure well, and there were no complications.  All counts were correct at the conclusion of the case.      I was present and scrubbed for the entirety of the case.        Electronically signed by Neil Rhodes MD on 7/7/2025 at 1:37 PM

## 2025-07-08 LAB
ALBUMIN SERPL-MCNC: 2.3 G/DL (ref 3.5–5.2)
ALBUMIN/GLOB SERPL: 0.6 {RATIO} (ref 1–2.5)
ALP SERPL-CCNC: 355 U/L (ref 35–104)
ALT SERPL-CCNC: 35 U/L (ref 10–35)
ANION GAP SERPL CALCULATED.3IONS-SCNC: 10 MMOL/L (ref 9–16)
AST SERPL-CCNC: 64 U/L (ref 10–35)
BASOPHILS # BLD: <0.03 K/UL (ref 0–0.2)
BASOPHILS NFR BLD: 0 % (ref 0–2)
BILIRUB SERPL-MCNC: 0.6 MG/DL (ref 0–1.2)
BUN SERPL-MCNC: 16 MG/DL (ref 8–23)
CALCIUM SERPL-MCNC: 8.7 MG/DL (ref 8.6–10.4)
CHLORIDE SERPL-SCNC: 102 MMOL/L (ref 98–107)
CO2 SERPL-SCNC: 23 MMOL/L (ref 20–31)
CREAT SERPL-MCNC: 0.7 MG/DL (ref 0.6–0.9)
EOSINOPHIL # BLD: 0.05 K/UL (ref 0–0.44)
EOSINOPHILS RELATIVE PERCENT: 1 % (ref 1–4)
ERYTHROCYTE [DISTWIDTH] IN BLOOD BY AUTOMATED COUNT: 13.8 % (ref 11.8–14.4)
GFR, ESTIMATED: 88 ML/MIN/1.73M2
GLUCOSE BLD-MCNC: 111 MG/DL (ref 65–105)
GLUCOSE BLD-MCNC: 122 MG/DL (ref 65–105)
GLUCOSE BLD-MCNC: 150 MG/DL (ref 65–105)
GLUCOSE BLD-MCNC: 162 MG/DL (ref 65–105)
GLUCOSE SERPL-MCNC: 114 MG/DL (ref 74–99)
HCT VFR BLD AUTO: 24 % (ref 36.3–47.1)
HCT VFR BLD AUTO: 27.7 % (ref 36.3–47.1)
HGB BLD-MCNC: 8 G/DL (ref 11.9–15.1)
HGB BLD-MCNC: 9 G/DL (ref 11.9–15.1)
IMM GRANULOCYTES # BLD AUTO: 0.21 K/UL (ref 0–0.3)
IMM GRANULOCYTES NFR BLD: 2 %
LYMPHOCYTES NFR BLD: 1.16 K/UL (ref 1.1–3.7)
LYMPHOCYTES RELATIVE PERCENT: 11 % (ref 24–43)
MAGNESIUM SERPL-MCNC: 2.1 MG/DL (ref 1.6–2.4)
MCH RBC QN AUTO: 31.4 PG (ref 25.2–33.5)
MCHC RBC AUTO-ENTMCNC: 33.3 G/DL (ref 28.4–34.8)
MCV RBC AUTO: 94.1 FL (ref 82.6–102.9)
MONOCYTES NFR BLD: 0.93 K/UL (ref 0.1–1.2)
MONOCYTES NFR BLD: 9 % (ref 3–12)
NEUTROPHILS NFR BLD: 77 % (ref 36–65)
NEUTS SEG NFR BLD: 8.11 K/UL (ref 1.5–8.1)
NRBC BLD-RTO: 0 PER 100 WBC
PLATELET # BLD AUTO: 435 K/UL (ref 138–453)
PMV BLD AUTO: 8.9 FL (ref 8.1–13.5)
POTASSIUM SERPL-SCNC: 4.1 MMOL/L (ref 3.7–5.3)
PROT SERPL-MCNC: 6.2 G/DL (ref 6.6–8.7)
RBC # BLD AUTO: 2.55 M/UL (ref 3.95–5.11)
SODIUM SERPL-SCNC: 135 MMOL/L (ref 136–145)
SURGICAL PATHOLOGY REPORT: NORMAL
WBC OTHER # BLD: 10.5 K/UL (ref 3.5–11.3)

## 2025-07-08 PROCEDURE — 36415 COLL VENOUS BLD VENIPUNCTURE: CPT

## 2025-07-08 PROCEDURE — 97116 GAIT TRAINING THERAPY: CPT

## 2025-07-08 PROCEDURE — 2580000003 HC RX 258: Performed by: STUDENT IN AN ORGANIZED HEALTH CARE EDUCATION/TRAINING PROGRAM

## 2025-07-08 PROCEDURE — 99232 SBSQ HOSP IP/OBS MODERATE 35: CPT | Performed by: NURSE PRACTITIONER

## 2025-07-08 PROCEDURE — 2580000003 HC RX 258

## 2025-07-08 PROCEDURE — 6360000002 HC RX W HCPCS

## 2025-07-08 PROCEDURE — 1200000000 HC SEMI PRIVATE

## 2025-07-08 PROCEDURE — 85025 COMPLETE CBC W/AUTO DIFF WBC: CPT

## 2025-07-08 PROCEDURE — 97530 THERAPEUTIC ACTIVITIES: CPT

## 2025-07-08 PROCEDURE — 6370000000 HC RX 637 (ALT 250 FOR IP)

## 2025-07-08 PROCEDURE — 6370000000 HC RX 637 (ALT 250 FOR IP): Performed by: STUDENT IN AN ORGANIZED HEALTH CARE EDUCATION/TRAINING PROGRAM

## 2025-07-08 PROCEDURE — 82947 ASSAY GLUCOSE BLOOD QUANT: CPT

## 2025-07-08 PROCEDURE — 99232 SBSQ HOSP IP/OBS MODERATE 35: CPT | Performed by: INTERNAL MEDICINE

## 2025-07-08 PROCEDURE — 85014 HEMATOCRIT: CPT

## 2025-07-08 PROCEDURE — 2500000003 HC RX 250 WO HCPCS

## 2025-07-08 PROCEDURE — 80053 COMPREHEN METABOLIC PANEL: CPT

## 2025-07-08 PROCEDURE — 85018 HEMOGLOBIN: CPT

## 2025-07-08 PROCEDURE — 83735 ASSAY OF MAGNESIUM: CPT

## 2025-07-08 RX ORDER — 0.9 % SODIUM CHLORIDE 0.9 %
500 INTRAVENOUS SOLUTION INTRAVENOUS ONCE
Status: COMPLETED | OUTPATIENT
Start: 2025-07-08 | End: 2025-07-08

## 2025-07-08 RX ORDER — ATORVASTATIN CALCIUM 80 MG/1
80 TABLET, FILM COATED ORAL NIGHTLY
Status: DISCONTINUED | OUTPATIENT
Start: 2025-07-08 | End: 2025-07-10 | Stop reason: HOSPADM

## 2025-07-08 RX ORDER — MECLIZINE HCL 12.5 MG 12.5 MG/1
12.5 TABLET ORAL 3 TIMES DAILY PRN
Status: DISCONTINUED | OUTPATIENT
Start: 2025-07-08 | End: 2025-07-10 | Stop reason: HOSPADM

## 2025-07-08 RX ORDER — PANTOPRAZOLE SODIUM 40 MG/1
40 TABLET, DELAYED RELEASE ORAL
Status: DISCONTINUED | OUTPATIENT
Start: 2025-07-08 | End: 2025-07-10 | Stop reason: HOSPADM

## 2025-07-08 RX ORDER — AMLODIPINE BESYLATE 5 MG/1
5 TABLET ORAL DAILY
Status: DISCONTINUED | OUTPATIENT
Start: 2025-07-08 | End: 2025-07-10 | Stop reason: HOSPADM

## 2025-07-08 RX ORDER — OXYCODONE HYDROCHLORIDE 5 MG/1
2.5 TABLET ORAL EVERY 4 HOURS PRN
Refills: 0 | Status: DISCONTINUED | OUTPATIENT
Start: 2025-07-08 | End: 2025-07-10 | Stop reason: HOSPADM

## 2025-07-08 RX ADMIN — Medication 2000 MG: at 18:01

## 2025-07-08 RX ADMIN — ENOXAPARIN SODIUM 40 MG: 100 INJECTION SUBCUTANEOUS at 09:27

## 2025-07-08 RX ADMIN — MECLIZINE 12.5 MG: 12.5 TABLET ORAL at 12:14

## 2025-07-08 RX ADMIN — SERTRALINE 100 MG: 50 TABLET, FILM COATED ORAL at 09:25

## 2025-07-08 RX ADMIN — AMLODIPINE BESYLATE 5 MG: 5 TABLET ORAL at 12:01

## 2025-07-08 RX ADMIN — ACETAMINOPHEN 1000 MG: 500 TABLET ORAL at 05:18

## 2025-07-08 RX ADMIN — ACETAMINOPHEN 1000 MG: 500 TABLET ORAL at 18:37

## 2025-07-08 RX ADMIN — Medication 2000 MG: at 00:00

## 2025-07-08 RX ADMIN — PANTOPRAZOLE SODIUM 40 MG: 40 TABLET, DELAYED RELEASE ORAL at 18:01

## 2025-07-08 RX ADMIN — SODIUM CHLORIDE, PRESERVATIVE FREE 40 MG: 5 INJECTION INTRAVENOUS at 09:28

## 2025-07-08 RX ADMIN — SODIUM CHLORIDE, PRESERVATIVE FREE 10 ML: 5 INJECTION INTRAVENOUS at 09:24

## 2025-07-08 RX ADMIN — SODIUM CHLORIDE, PRESERVATIVE FREE 10 ML: 5 INJECTION INTRAVENOUS at 20:33

## 2025-07-08 RX ADMIN — MICAFUNGIN SODIUM 100 MG: 100 INJECTION, POWDER, LYOPHILIZED, FOR SOLUTION INTRAVENOUS at 10:10

## 2025-07-08 RX ADMIN — ATORVASTATIN CALCIUM 80 MG: 80 TABLET, FILM COATED ORAL at 20:35

## 2025-07-08 RX ADMIN — Medication 2000 MG: at 09:24

## 2025-07-08 RX ADMIN — SODIUM CHLORIDE 500 ML: 0.9 INJECTION, SOLUTION INTRAVENOUS at 12:07

## 2025-07-08 RX ADMIN — METOPROLOL SUCCINATE 12.5 MG: 25 TABLET, FILM COATED, EXTENDED RELEASE ORAL at 09:27

## 2025-07-08 RX ADMIN — OXYCODONE 5 MG: 5 TABLET ORAL at 10:30

## 2025-07-08 ASSESSMENT — ENCOUNTER SYMPTOMS
RESPIRATORY NEGATIVE: 1
GASTROINTESTINAL NEGATIVE: 1

## 2025-07-08 ASSESSMENT — PAIN SCALES - GENERAL
PAINLEVEL_OUTOF10: 8
PAINLEVEL_OUTOF10: 2
PAINLEVEL_OUTOF10: 5
PAINLEVEL_OUTOF10: 0
PAINLEVEL_OUTOF10: 3

## 2025-07-08 ASSESSMENT — PAIN DESCRIPTION - ORIENTATION: ORIENTATION: LEFT

## 2025-07-08 ASSESSMENT — PAIN DESCRIPTION - LOCATION: LOCATION: ABDOMEN;SHOULDER

## 2025-07-08 NOTE — DISCHARGE INSTR - COC
Therapy:  Current Nutrition Therapy:   - Oral Diet:  General    Routes of Feeding: Oral  Liquids: No Restrictions  Daily Fluid Restriction: no  Last Modified Barium Swallow with Video (Video Swallowing Test): not done    Treatments at the Time of Hospital Discharge:   Respiratory Treatments: n/a  Oxygen Therapy:  is not on home oxygen therapy.  Ventilator:    - No ventilator support    Rehab Therapies: Physical Therapy and Occupational Therapy  Weight Bearing Status/Restrictions: No weight bearing restrictions  Other Medical Equipment (for information only, NOT a DME order):  walker  Other Treatments: n/a    Patient's personal belongings (please select all that are sent with patient):  None    RN SIGNATURE:  Electronically signed by ROMI Gary on 7/10/25 at 11:19 AM EDT    CASE MANAGEMENT/SOCIAL WORK SECTION    Inpatient Status Date: 07/01/2025    Readmission Risk Assessment Score:  [facility] RISK OF UNPLANNED READMISSION 2.0             16.6 Total Score        Discharging to Facility/ Agency   Name: [facility]  Address:  Phone:  Fax:    Dialysis Facility (if applicable)   Name:  Address:  Dialysis Schedule:  Phone:  Fax:    / signature: Electronically signed by GIOVANY CASTLE on 7/10/25 at 11:03 AM EDT    PHYSICIAN SECTION    Prognosis: Fair    Condition at Discharge: Stable    Rehab Potential (if transferring to Rehab): Fair    Recommended Labs or Other Treatments After Discharge:   Follow up with your PCP in 3 days. Call for an appointment as soon as possible.  Follow-up with specialist as instructed.  Call for an appointment as soon as possible.  Medications as instructed.  Return to the emergency department immediately for any new or worsening concerns.    CMP and CBC weekly, fax to Dr. Henry Lucas [phone]  Follow up in ID clinic in 4 weeks with Dr. Henry Lucas [phone]    Physician Certification: I certify the above information and transfer of Urvashi CALLOWAY

## 2025-07-08 NOTE — CARE COORDINATION
Case Management   Daily Progress Note       Patient Name: Urvashi Tse                   YOB: 1945  Diagnosis: Epidural abscess [G06.2]  Transaminitis [R74.01]  Vertebral osteomyelitis (HCC) [M46.20]                       GMLOS: 4.9 days  Length of Stay: 7  days    Anticipated Discharge Date: Longer than expected LOS    Readmission Risk (Low < 19, Mod (19-27), High > 27): Readmission Risk Score: 16.6        Current Transitional Plan    [] Home Independently    [] Home with HC    [x] Skilled Nursing Facility    [] Acute Rehabilitation    [] Long Term Acute Care (LTAC)    [] Other:     Plan for the Stay (Medical Management) :          Workflow Continuation (Additional Notes) : Spoke with patient and daughter. They would like referrals to [facility] and [facility]. Referrals sent via [facility].    1040 Spoke with Patient, son and daughter. They would like the referrals to be #1 [facility] #2 [facility] 3# [place] #4 [facility]. Additional referrals sent via [facility]. Referral to [facility] hand faxed with clinicals.    1327 VM from [place] with [facility] that they can accept and do have an open bed.    6250 Provided update to patient and daughter that [facility] accepted and [facility] is reviewing. Will update once [facility] makes a decision.        Anusha Savage RN  July 8, 2025

## 2025-07-08 NOTE — DISCHARGE INSTRUCTIONS
Follow up with your PCP in 3 days. Call for an appointment as soon as possible.  Follow-up with specialist as instructed.  Call for an appointment as soon as possible.  Medications as instructed.  Return to the emergency department immediately for any new or worsening concerns.        Discharge Instructions for General Surgery    No lifting above 10Ibs for next 2 weeks.  No soaking in bathtubs or submerging yourself in water for 2 weeks  Resume activity as tolerated, No operating heavy equipment while using narcotics Wash incision gently with soap and water, pat dry.  If steri-strips or surgical glue in place wash gently and leave in place until the glue or strips fall off. (Do not pull/tug)    F/u in trauma/acute care surgery clinic in 1-2 weeks Call your doctor for the following:  Chills  Temperature greater than 101  Pain that is not tolerable despite taking pain medicine as ordered There is increased swelling, redness or warmth at surgical site There is increased drainage or bleeding from surgical site    Recommended diet: low fat, low cholesterol diet  Depending on your injuries, your doctor may want you to follow a specific diet. Some pain medicine can cause constipation . To avoid this problem:  Drink plenty of fluids.  Eat foods high in fiber , such as:  Whole grain cereals and bread  Fruits and vegtables   Legumes (eg, beans, lentils)      If you are still having problems, talk to your doctor about using laxatives or stool softeners.    General questions or concerns call [phone] for the General Surgery Clinic.  If needed, the clinic fax number is [phone]

## 2025-07-08 NOTE — ANESTHESIA POSTPROCEDURE EVALUATION
Department of Anesthesiology  Postprocedure Note    Patient: Urvashi Tse  MRN: [id number]  YOB: 1945  Date of evaluation: 7/8/2025    Procedure Summary       Date: 07/07/25 Room / Location: [address]    Anesthesia Start: 1138 Anesthesia Stop: 1347    Procedures:       CHOLECYSTECTOMY LAPAROSCOPIC, EUS, ERCP      ENDOSCOPIC ULTRASOUND      ESOPHAGOGASTRODUODENOSCOPY BIOPSY Diagnosis:       Symptomatic cholelithiasis      (Symptomatic cholelithiasis [K80.20])    Surgeons: Neil Rhodes MD Responsible Provider: Manny Kuo MD    Anesthesia Type: general ASA Status: 3            Anesthesia Type: No value filed.    Zenobia Phase I: Zenobia Score: 10    Zenobia Phase II:      Anesthesia Post Evaluation    Patient location during evaluation: bedside  Patient participation: complete - patient participated  Level of consciousness: awake and alert  Airway patency: patent  Nausea & Vomiting: no nausea and no vomiting  Cardiovascular status: hemodynamically stable  Respiratory status: acceptable  Hydration status: stable  Comments: BP (!) 156/66   Pulse 81   Temp 97.7 °F (36.5 °C)   Resp 16   Ht 1.6 m (5' 3\")   Wt 58.5 kg (129 lb)   LMP  (LMP Unknown)   SpO2 100%   BMI 22.85 kg/m²     Pain management: adequate    No notable events documented.

## 2025-07-09 LAB
ALBUMIN SERPL-MCNC: 2.5 G/DL (ref 3.5–5.2)
ALBUMIN/GLOB SERPL: 0.6 {RATIO} (ref 1–2.5)
ALP SERPL-CCNC: 354 U/L (ref 35–104)
ALT SERPL-CCNC: 28 U/L (ref 10–35)
ANION GAP SERPL CALCULATED.3IONS-SCNC: 9 MMOL/L (ref 9–16)
AST SERPL-CCNC: 54 U/L (ref 10–35)
BASOPHILS # BLD: 0.03 K/UL (ref 0–0.2)
BASOPHILS NFR BLD: 0 % (ref 0–2)
BILIRUB SERPL-MCNC: 0.6 MG/DL (ref 0–1.2)
BUN SERPL-MCNC: 13 MG/DL (ref 8–23)
CALCIUM SERPL-MCNC: 8.9 MG/DL (ref 8.6–10.4)
CHLORIDE SERPL-SCNC: 102 MMOL/L (ref 98–107)
CO2 SERPL-SCNC: 24 MMOL/L (ref 20–31)
CREAT SERPL-MCNC: 0.7 MG/DL (ref 0.6–0.9)
EOSINOPHIL # BLD: 0.04 K/UL (ref 0–0.44)
EOSINOPHILS RELATIVE PERCENT: 0 % (ref 1–4)
ERYTHROCYTE [DISTWIDTH] IN BLOOD BY AUTOMATED COUNT: 14.1 % (ref 11.8–14.4)
GFR, ESTIMATED: 88 ML/MIN/1.73M2
GLUCOSE BLD-MCNC: 109 MG/DL (ref 65–105)
GLUCOSE BLD-MCNC: 113 MG/DL (ref 65–105)
GLUCOSE BLD-MCNC: 121 MG/DL (ref 65–105)
GLUCOSE BLD-MCNC: 136 MG/DL (ref 65–105)
GLUCOSE BLD-MCNC: 140 MG/DL (ref 65–105)
GLUCOSE SERPL-MCNC: 120 MG/DL (ref 74–99)
HCT VFR BLD AUTO: 27.3 % (ref 36.3–47.1)
HGB BLD-MCNC: 8.6 G/DL (ref 11.9–15.1)
IMM GRANULOCYTES # BLD AUTO: 0.17 K/UL (ref 0–0.3)
IMM GRANULOCYTES NFR BLD: 2 %
LYMPHOCYTES NFR BLD: 1.05 K/UL (ref 1.1–3.7)
LYMPHOCYTES RELATIVE PERCENT: 11 % (ref 24–43)
MAGNESIUM SERPL-MCNC: 1.9 MG/DL (ref 1.6–2.4)
MCH RBC QN AUTO: 31 PG (ref 25.2–33.5)
MCHC RBC AUTO-ENTMCNC: 31.5 G/DL (ref 28.4–34.8)
MCV RBC AUTO: 98.6 FL (ref 82.6–102.9)
MONOCYTES NFR BLD: 0.89 K/UL (ref 0.1–1.2)
MONOCYTES NFR BLD: 9 % (ref 3–12)
NEUTROPHILS NFR BLD: 78 % (ref 36–65)
NEUTS SEG NFR BLD: 7.35 K/UL (ref 1.5–8.1)
NRBC BLD-RTO: 0 PER 100 WBC
PLATELET # BLD AUTO: 546 K/UL (ref 138–453)
PMV BLD AUTO: 8.6 FL (ref 8.1–13.5)
POTASSIUM SERPL-SCNC: 3.8 MMOL/L (ref 3.7–5.3)
PROT SERPL-MCNC: 7 G/DL (ref 6.6–8.7)
RBC # BLD AUTO: 2.77 M/UL (ref 3.95–5.11)
SODIUM SERPL-SCNC: 135 MMOL/L (ref 136–145)
WBC OTHER # BLD: 9.5 K/UL (ref 3.5–11.3)

## 2025-07-09 PROCEDURE — 97116 GAIT TRAINING THERAPY: CPT

## 2025-07-09 PROCEDURE — 85025 COMPLETE CBC W/AUTO DIFF WBC: CPT

## 2025-07-09 PROCEDURE — 6360000002 HC RX W HCPCS

## 2025-07-09 PROCEDURE — 97535 SELF CARE MNGMENT TRAINING: CPT

## 2025-07-09 PROCEDURE — 2500000003 HC RX 250 WO HCPCS

## 2025-07-09 PROCEDURE — 6370000000 HC RX 637 (ALT 250 FOR IP)

## 2025-07-09 PROCEDURE — 36415 COLL VENOUS BLD VENIPUNCTURE: CPT

## 2025-07-09 PROCEDURE — 1200000000 HC SEMI PRIVATE

## 2025-07-09 PROCEDURE — 6370000000 HC RX 637 (ALT 250 FOR IP): Performed by: STUDENT IN AN ORGANIZED HEALTH CARE EDUCATION/TRAINING PROGRAM

## 2025-07-09 PROCEDURE — 82947 ASSAY GLUCOSE BLOOD QUANT: CPT

## 2025-07-09 PROCEDURE — 80053 COMPREHEN METABOLIC PANEL: CPT

## 2025-07-09 PROCEDURE — 99232 SBSQ HOSP IP/OBS MODERATE 35: CPT | Performed by: NURSE PRACTITIONER

## 2025-07-09 PROCEDURE — 2580000003 HC RX 258

## 2025-07-09 PROCEDURE — 6370000000 HC RX 637 (ALT 250 FOR IP): Performed by: NURSE PRACTITIONER

## 2025-07-09 PROCEDURE — 99232 SBSQ HOSP IP/OBS MODERATE 35: CPT | Performed by: INTERNAL MEDICINE

## 2025-07-09 PROCEDURE — 83735 ASSAY OF MAGNESIUM: CPT

## 2025-07-09 RX ORDER — PANTOPRAZOLE SODIUM 40 MG/1
40 TABLET, DELAYED RELEASE ORAL
DISCHARGE
Start: 2025-07-10

## 2025-07-09 RX ORDER — QUETIAPINE FUMARATE 25 MG/1
25 TABLET, FILM COATED ORAL NIGHTLY
Status: DISCONTINUED | OUTPATIENT
Start: 2025-07-09 | End: 2025-07-10 | Stop reason: HOSPADM

## 2025-07-09 RX ORDER — METOPROLOL SUCCINATE 25 MG/1
12.5 TABLET, EXTENDED RELEASE ORAL DAILY
DISCHARGE
Start: 2025-07-10

## 2025-07-09 RX ADMIN — Medication 2000 MG: at 16:32

## 2025-07-09 RX ADMIN — Medication 2000 MG: at 08:08

## 2025-07-09 RX ADMIN — SODIUM CHLORIDE, PRESERVATIVE FREE 10 ML: 5 INJECTION INTRAVENOUS at 21:44

## 2025-07-09 RX ADMIN — SERTRALINE 100 MG: 50 TABLET, FILM COATED ORAL at 08:08

## 2025-07-09 RX ADMIN — ATORVASTATIN CALCIUM 80 MG: 80 TABLET, FILM COATED ORAL at 21:44

## 2025-07-09 RX ADMIN — DICLOFENAC SODIUM 2 G: 10 GEL TOPICAL at 13:57

## 2025-07-09 RX ADMIN — AMLODIPINE BESYLATE 5 MG: 5 TABLET ORAL at 08:08

## 2025-07-09 RX ADMIN — MICAFUNGIN SODIUM 100 MG: 100 INJECTION, POWDER, LYOPHILIZED, FOR SOLUTION INTRAVENOUS at 08:15

## 2025-07-09 RX ADMIN — Medication 2000 MG: at 01:15

## 2025-07-09 RX ADMIN — DICLOFENAC SODIUM 2 G: 10 GEL TOPICAL at 21:58

## 2025-07-09 RX ADMIN — PANTOPRAZOLE SODIUM 40 MG: 40 TABLET, DELAYED RELEASE ORAL at 06:13

## 2025-07-09 RX ADMIN — POLYETHYLENE GLYCOL 3350 17 G: 17 POWDER, FOR SOLUTION ORAL at 08:08

## 2025-07-09 RX ADMIN — ACETAMINOPHEN 1000 MG: 500 TABLET ORAL at 01:02

## 2025-07-09 RX ADMIN — PANTOPRAZOLE SODIUM 40 MG: 40 TABLET, DELAYED RELEASE ORAL at 16:32

## 2025-07-09 RX ADMIN — ENOXAPARIN SODIUM 40 MG: 100 INJECTION SUBCUTANEOUS at 08:07

## 2025-07-09 RX ADMIN — METOPROLOL SUCCINATE 12.5 MG: 25 TABLET, FILM COATED, EXTENDED RELEASE ORAL at 08:08

## 2025-07-09 RX ADMIN — ACETAMINOPHEN 1000 MG: 500 TABLET ORAL at 21:44

## 2025-07-09 RX ADMIN — QUETIAPINE FUMARATE 25 MG: 25 TABLET ORAL at 21:58

## 2025-07-09 ASSESSMENT — ENCOUNTER SYMPTOMS
GASTROINTESTINAL NEGATIVE: 1
RESPIRATORY NEGATIVE: 1

## 2025-07-09 ASSESSMENT — PAIN SCALES - GENERAL
PAINLEVEL_OUTOF10: 5
PAINLEVEL_OUTOF10: 5
PAINLEVEL_OUTOF10: 2
PAINLEVEL_OUTOF10: 5
PAINLEVEL_OUTOF10: 6

## 2025-07-09 ASSESSMENT — PAIN DESCRIPTION - DESCRIPTORS: DESCRIPTORS: ACHING;DISCOMFORT

## 2025-07-09 ASSESSMENT — PAIN DESCRIPTION - LOCATION
LOCATION: ABDOMEN
LOCATION: ABDOMEN;SHOULDER

## 2025-07-09 ASSESSMENT — PAIN - FUNCTIONAL ASSESSMENT: PAIN_FUNCTIONAL_ASSESSMENT: PREVENTS OR INTERFERES SOME ACTIVE ACTIVITIES AND ADLS

## 2025-07-09 ASSESSMENT — PAIN DESCRIPTION - ORIENTATION: ORIENTATION: LEFT

## 2025-07-09 NOTE — CARE COORDINATION
Case Management   Daily Progress Note       Patient Name: Urvashi Tse                   YOB: 1945  Diagnosis: Epidural abscess [G06.2]  Transaminitis [R74.01]  Vertebral osteomyelitis (HCC) [M46.20]                       GMLOS: 4.9 days  Length of Stay: 8  days    Anticipated Discharge Date: One day until discharge    Readmission Risk (Low < 19, Mod (19-27), High > 27): Readmission Risk Score: 17.5        Current Transitional Plan    [] Home Independently    [] Home with HC    [x] Skilled Nursing Facility    [] Acute Rehabilitation    [] Long Term Acute Care (LTAC)    [] Other:     Plan for the Stay (Medical Management) :          Workflow Continuation (Additional Notes) : Messaged [facility] intake department. Requested referral status.    1555 Both [facility] and [facility] are considering the patient. Sent updated notes. Per Letha with [facility], they may be able to admit the patient tomorrow upon approval.          GIOVANY CASTLE  July 9, 2025

## 2025-07-10 VITALS
BODY MASS INDEX: 23.87 KG/M2 | HEART RATE: 73 BPM | RESPIRATION RATE: 18 BRPM | HEIGHT: 63 IN | OXYGEN SATURATION: 98 % | TEMPERATURE: 98.2 F | DIASTOLIC BLOOD PRESSURE: 93 MMHG | WEIGHT: 134.7 LBS | SYSTOLIC BLOOD PRESSURE: 152 MMHG

## 2025-07-10 LAB
ALBUMIN SERPL-MCNC: 2.6 G/DL (ref 3.5–5.2)
ALBUMIN/GLOB SERPL: 0.6 {RATIO} (ref 1–2.5)
ALP SERPL-CCNC: 355 U/L (ref 35–104)
ALT SERPL-CCNC: 27 U/L (ref 10–35)
ANION GAP SERPL CALCULATED.3IONS-SCNC: 11 MMOL/L (ref 9–16)
AST SERPL-CCNC: 52 U/L (ref 10–35)
BASOPHILS # BLD: 0.04 K/UL (ref 0–0.2)
BASOPHILS NFR BLD: 1 % (ref 0–2)
BILIRUB SERPL-MCNC: 0.8 MG/DL (ref 0–1.2)
BUN SERPL-MCNC: 11 MG/DL (ref 8–23)
CALCIUM SERPL-MCNC: 9.1 MG/DL (ref 8.6–10.4)
CHLORIDE SERPL-SCNC: 98 MMOL/L (ref 98–107)
CO2 SERPL-SCNC: 26 MMOL/L (ref 20–31)
CREAT SERPL-MCNC: 0.6 MG/DL (ref 0.6–0.9)
EOSINOPHIL # BLD: 0.05 K/UL (ref 0–0.44)
EOSINOPHILS RELATIVE PERCENT: 1 % (ref 1–4)
ERYTHROCYTE [DISTWIDTH] IN BLOOD BY AUTOMATED COUNT: 14 % (ref 11.8–14.4)
GFR, ESTIMATED: >90 ML/MIN/1.73M2
GLUCOSE BLD-MCNC: 121 MG/DL (ref 65–105)
GLUCOSE BLD-MCNC: 126 MG/DL (ref 65–105)
GLUCOSE BLD-MCNC: 127 MG/DL (ref 65–105)
GLUCOSE SERPL-MCNC: 128 MG/DL (ref 74–99)
HCT VFR BLD AUTO: 25.4 % (ref 36.3–47.1)
HGB BLD-MCNC: 8.3 G/DL (ref 11.9–15.1)
IMM GRANULOCYTES # BLD AUTO: 0.14 K/UL (ref 0–0.3)
IMM GRANULOCYTES NFR BLD: 2 %
LYMPHOCYTES NFR BLD: 0.96 K/UL (ref 1.1–3.7)
LYMPHOCYTES RELATIVE PERCENT: 11 % (ref 24–43)
MAGNESIUM SERPL-MCNC: 2 MG/DL (ref 1.6–2.4)
MCH RBC QN AUTO: 31.1 PG (ref 25.2–33.5)
MCHC RBC AUTO-ENTMCNC: 32.7 G/DL (ref 28.4–34.8)
MCV RBC AUTO: 95.1 FL (ref 82.6–102.9)
MICROORGANISM SPEC CULT: NORMAL
MONOCYTES NFR BLD: 0.84 K/UL (ref 0.1–1.2)
MONOCYTES NFR BLD: 10 % (ref 3–12)
NEUTROPHILS NFR BLD: 75 % (ref 36–65)
NEUTS SEG NFR BLD: 6.53 K/UL (ref 1.5–8.1)
NRBC BLD-RTO: 0 PER 100 WBC
PLATELET # BLD AUTO: 490 K/UL (ref 138–453)
PMV BLD AUTO: 8.6 FL (ref 8.1–13.5)
POTASSIUM SERPL-SCNC: 3.8 MMOL/L (ref 3.7–5.3)
PROT SERPL-MCNC: 6.8 G/DL (ref 6.6–8.7)
RBC # BLD AUTO: 2.67 M/UL (ref 3.95–5.11)
SERVICE CMNT-IMP: NORMAL
SODIUM SERPL-SCNC: 135 MMOL/L (ref 136–145)
SPECIMEN DESCRIPTION: NORMAL
WBC OTHER # BLD: 8.6 K/UL (ref 3.5–11.3)

## 2025-07-10 PROCEDURE — 2580000003 HC RX 258

## 2025-07-10 PROCEDURE — 6370000000 HC RX 637 (ALT 250 FOR IP)

## 2025-07-10 PROCEDURE — 99232 SBSQ HOSP IP/OBS MODERATE 35: CPT | Performed by: INTERNAL MEDICINE

## 2025-07-10 PROCEDURE — 83735 ASSAY OF MAGNESIUM: CPT

## 2025-07-10 PROCEDURE — 82947 ASSAY GLUCOSE BLOOD QUANT: CPT

## 2025-07-10 PROCEDURE — 2500000003 HC RX 250 WO HCPCS

## 2025-07-10 PROCEDURE — 85025 COMPLETE CBC W/AUTO DIFF WBC: CPT

## 2025-07-10 PROCEDURE — 80053 COMPREHEN METABOLIC PANEL: CPT

## 2025-07-10 PROCEDURE — 6360000002 HC RX W HCPCS

## 2025-07-10 PROCEDURE — 6370000000 HC RX 637 (ALT 250 FOR IP): Performed by: STUDENT IN AN ORGANIZED HEALTH CARE EDUCATION/TRAINING PROGRAM

## 2025-07-10 PROCEDURE — 36415 COLL VENOUS BLD VENIPUNCTURE: CPT

## 2025-07-10 RX ORDER — DOCUSATE SODIUM 100 MG/1
100 CAPSULE, LIQUID FILLED ORAL DAILY
Status: DISCONTINUED | OUTPATIENT
Start: 2025-07-10 | End: 2025-07-10 | Stop reason: HOSPADM

## 2025-07-10 RX ADMIN — METOPROLOL SUCCINATE 12.5 MG: 25 TABLET, FILM COATED, EXTENDED RELEASE ORAL at 08:10

## 2025-07-10 RX ADMIN — MICAFUNGIN SODIUM 100 MG: 100 INJECTION, POWDER, LYOPHILIZED, FOR SOLUTION INTRAVENOUS at 08:44

## 2025-07-10 RX ADMIN — DICLOFENAC SODIUM 2 G: 10 GEL TOPICAL at 08:10

## 2025-07-10 RX ADMIN — SODIUM CHLORIDE 20 ML: 0.9 INJECTION, SOLUTION INTRAVENOUS at 08:39

## 2025-07-10 RX ADMIN — SERTRALINE 100 MG: 50 TABLET, FILM COATED ORAL at 08:10

## 2025-07-10 RX ADMIN — Medication 2000 MG: at 00:56

## 2025-07-10 RX ADMIN — DOCUSATE SODIUM 100 MG: 100 CAPSULE, LIQUID FILLED ORAL at 12:43

## 2025-07-10 RX ADMIN — PANTOPRAZOLE SODIUM 40 MG: 40 TABLET, DELAYED RELEASE ORAL at 15:33

## 2025-07-10 RX ADMIN — ENOXAPARIN SODIUM 40 MG: 100 INJECTION SUBCUTANEOUS at 08:08

## 2025-07-10 RX ADMIN — AMLODIPINE BESYLATE 5 MG: 5 TABLET ORAL at 08:10

## 2025-07-10 RX ADMIN — SODIUM CHLORIDE, PRESERVATIVE FREE 10 ML: 5 INJECTION INTRAVENOUS at 08:11

## 2025-07-10 RX ADMIN — PANTOPRAZOLE SODIUM 40 MG: 40 TABLET, DELAYED RELEASE ORAL at 06:32

## 2025-07-10 RX ADMIN — Medication 2000 MG: at 08:09

## 2025-07-10 RX ADMIN — Medication 2000 MG: at 15:32

## 2025-07-10 RX ADMIN — POLYETHYLENE GLYCOL 3350 17 G: 17 POWDER, FOR SOLUTION ORAL at 08:45

## 2025-07-10 ASSESSMENT — PAIN DESCRIPTION - LOCATION: LOCATION: SHOULDER

## 2025-07-10 ASSESSMENT — PAIN DESCRIPTION - ORIENTATION: ORIENTATION: RIGHT;LEFT

## 2025-07-10 ASSESSMENT — PAIN SCALES - GENERAL: PAINLEVEL_OUTOF10: 5

## 2025-07-10 ASSESSMENT — PAIN DESCRIPTION - DESCRIPTORS: DESCRIPTORS: ACHING

## 2025-07-10 NOTE — DISCHARGE SUMMARY
Report called to [facility], writer spoke to Manuelito LLANOS. All questions answered. Pt wheeled off unit by transport with all documented belongings.

## 2025-07-10 NOTE — CARE COORDINATION
Spoke with Letha with [facility]. Patient declined due to bed availability.    1049 Spoke with Raya with [facility] - they are able to accept today. States she need a late discharge to have time to prep for the patients arrival.    1055 Transportation request sent to [facility] with 1700 .    1116 Met with the patient to provide an update on the transition plan. Informed her that [facility] declined due to no beds available. [facility] can accept today. Transportation has been requested for 1700. Writer to notify the patients daughter. IMM explained and copy provided to the patient.    1145 Spoke with Reema the patients daughter. Informed her the patient will discharge to [facility] today around 1700.    1328 HENS completed.    1448 AVS and HENS sent to [facility] via [facility]. Transportation confirmed 1600 .      Discharge Report    [facility]  Clinical Case Management Department  Written by: GIOVANY CASTLE    Patient Name: Urvashi CALLOWAY Carmencita  Attending Provider: Mando Paul DO  Admit Date: 2025 10:43 PM  MRN: [id number]  Account: [id number]                     : 1945  Discharge Date: 07/10/2025      Disposition: [facility]    GIOVANY CASTLE

## 2025-07-10 NOTE — PROGRESS NOTES
PROGRESS NOTE          PATIENT NAME: Urvashi Tse  MEDICAL RECORD NO. [id number]  DATE: 2025  SURGEON:   PRIMARY CARE PHYSICIAN: Camden Fields ND    HD: # 5    ASSESSMENT    Patient Active Problem List   Diagnosis    Hematochezia    Cerebral concussion    Hypertension    Diabetes mellitus (HCC)    COVID-19    Epidural abscess    Bacteremia    Vertebral osteomyelitis (HCC)    Fungemia    Mass of lower lobe of left lung    Renal lesion    Liver injury    Choledocholithiasis    Acute hepatitis    Transaminitis    Septic embolism (HCC)         MEDICAL DECISION MAKING AND PLAN    Egd per GI Monday for anemia, eus for CBD stone  NPO from midnight of   DVT Prophylaxis- ok to continue  Planning lap ryan .  Rest of the care per primary      Chief Complaint: \"Back pain\"    SUBJECTIVE    Urvashi Tse is af, vss, states she did not sleep well last night.  Denies abdominal pain, nausea, vomiting.      OBJECTIVE  VITALS: Temp: Temp: 99 °F (37.2 °C)Temp  Av.7 °F (37.1 °C)  Min: 98.4 °F (36.9 °C)  Max: 99 °F (37.2 °C) BP Systolic (24hrs), Av , Min:133 , Max:152   Diastolic (24hrs), Av, Min:68, Max:74   Pulse Pulse  Av  Min: 74  Max: 76 Resp Resp  Av  Min: 16  Max: 18 Pulse ox SpO2  Av %  Min: 100 %  Max: 100 %  GENERAL: alert, no distress  NEURO: GCS 15  HEENT: normocephalic, no central cyanosis  : deferred  LUNGS: Normal work of breathing on room air  HEART: normal rate and regular rhythm  ABDOMEN: Soft nontender, nondistended  EXTREMITY: no cyanosis, clubbing or edema    I/O last 3 completed shifts:  In: 1591.7 [P.O.:990; I.V.:501.7; IV Piggyback:100]  Out: 165 [Urine:165]    Drain/tube output:  In: 820 [P.O.:820]  Out: 165 [Urine:165]    LAB:  CBC:   Recent Labs     25  0621 25  1910 25  0448 25  1800   WBC 17.7*  --  12.4*  --    HGB 8.8* 9.8* 8.0* 8.3*   HCT 27.5* 29.1* 24.5* 25.0*   .4  --  95.0  --      --  307  --      BMP:   Recent 
    PROGRESS NOTE          PATIENT NAME: Urvashi Tse  MEDICAL RECORD NO. [id number]  DATE: 2025  SURGEON:   PRIMARY CARE PHYSICIAN: Camden Fields ND    HD: # 6    ASSESSMENT    Patient Active Problem List   Diagnosis    Hematochezia    Cerebral concussion    Hypertension    Diabetes mellitus (HCC)    COVID-19    Epidural abscess    Bacteremia    Vertebral osteomyelitis (HCC)    Fungemia    Mass of lower lobe of left lung    Renal lesion    Liver injury    Choledocholithiasis    Acute hepatitis    Transaminitis    Septic embolism (HCC)         MEDICAL DECISION MAKING AND PLAN    Per GI- Planning for EUS to assess CBD stone and EGD to evaluate source of anemia and melena today ()  Patient has been NPO since midnight  DVT Prophylaxis- per GI hold AM lovenox this morning ()  Planning lap ryan today ()  Rest of the care per primary      Chief Complaint: \"Back pain\"    SUBJECTIVE    Urvashi Tse is af, vss. Resting in bed at time of visit. States that she slept better last night. No N/V/abd pain, no hematuria. Reports having 1 bowel movement everyday. States has been NPO since midnight, but overall improved appetite.       OBJECTIVE  VITALS: Temp: Temp: 99.1 °F (37.3 °C)Temp  Av.1 °F (37.3 °C)  Min: 99 °F (37.2 °C)  Max: 99.1 °F (37.3 °C) BP Systolic (24hrs), Av , Min:149 , Max:152   Diastolic (24hrs), Av, Min:67, Max:68   Pulse Pulse  Av  Min: 68  Max: 76 Resp Resp  Avg: 15.6  Min: 12  Max: 17 Pulse ox SpO2  Av.5 %  Min: 99 %  Max: 100 %  GENERAL: alert, no distress  NEURO: GCS 15  HEENT: normocephalic, no central cyanosis  : deferred  LUNGS: Normal work of breathing on room air  HEART: normal rate and regular rhythm  ABDOMEN: Soft nontender, nondistended, no pain on deep palpation  EXTREMITY: no cyanosis, clubbing or edema, cap refill <2 seconds    I/O last 3 completed shifts:  In: 1540 [P.O.:1540]  Out: 665 [Urine:665]    Drain/tube output:  In: 850 [P.O.:850]  Out: 872 
    PROGRESS NOTE          PATIENT NAME: Urvashi Tse  MEDICAL RECORD NO. [id number]  DATE: 2025  SURGEON:   PRIMARY CARE PHYSICIAN: Camden Fields ND    HD: # 4    ASSESSMENT    Patient Active Problem List   Diagnosis    Hematochezia    Cerebral concussion    Hypertension    Diabetes mellitus (HCC)    COVID-19    Epidural abscess    Bacteremia    Vertebral osteomyelitis (HCC)    Fungemia    Mass of lower lobe of left lung    Renal lesion    Liver injury    Choledocholithiasis    Acute hepatitis    Transaminitis    Septic embolism (HCC)         MEDICAL DECISION MAKING AND PLAN    GI planning EGD and EUS   NPO from midnight of   DVT Prophylaxis- ok to continue  Planning lap ryan . Will coordinate with GI if doing ERCP to have joint case.  Rest of the care per primary      Chief Complaint: \"Back pain\"    SUBJECTIVE    Urvashi Tse is has slightly improved since yesterday. Admits to some melena. Tolerating diet. Denies fever, chills, CP, SOA, and pain, or nausea. White count and LFT's downtrending.      OBJECTIVE  VITALS: Temp: Temp: 97.5 °F (36.4 °C)Temp  Av.3 °F (36.8 °C)  Min: 97.5 °F (36.4 °C)  Max: 99 °F (37.2 °C) BP Systolic (24hrs), Av , Min:132 , Max:155   Diastolic (24hrs), Av, Min:66, Max:67   Pulse Pulse  Av  Min: 80  Max: 80 Resp Resp  Av  Min: 16  Max: 18 Pulse ox SpO2  Av %  Min: 100 %  Max: 100 %  GENERAL: alert, no distress  NEURO: GCS 15  HEENT: normocephalic, no central cyanosis  : deferred  LUNGS: clear to ausculation, without wheezes, rales or rhonci  HEART: normal rate and regular rhythm  ABDOMEN: soft, non-tender, non-distended, bowel sounds present in all 4 quadrants, and no guarding or peritoneal signs present  EXTREMITY: no cyanosis, clubbing or edema    I/O last 3 completed shifts:  In: 2621.7 [P.O.:2020; I.V.:501.7; IV Piggyback:100]  Out: 0     Drain/tube output:  In: 1541.7 [P.O.:940; I.V.:501.7]  Out: 0     LAB:  CBC:   Recent Labs     
    PROGRESS NOTE      PATIENT NAME: Urvashi Tse  MEDICAL RECORD NO. [id number]  DATE: 2025  SURGEON:   PRIMARY CARE PHYSICIAN: Camden Fields ND    HD: # 7    ASSESSMENT    Patient Active Problem List   Diagnosis    Hematochezia    Cerebral concussion    Hypertension    Diabetes mellitus (HCC)    COVID-19    Epidural abscess    Bacteremia    Vertebral osteomyelitis (HCC)    Fungemia    Mass of lower lobe of left lung    Renal lesion    Liver injury    Choledocholithiasis    Acute hepatitis    Transaminitis    Septic embolism (HCC)    ROXANNA (acute kidney injury)    Anemia     81 yo female with psoas and epidural abscess, found to have choledocholithiases.      MEDICAL DECISION MAKING AND PLAN    GI performed EGD with EUS yesterday that was followed by melvin davis.  Patient significant improved post operatively.   LFTs slightly improved today   Continue abx for MSK abscesses  Continue regular low fat diet  Ok for DVT ppx  Continue primary management by medical team  General surgery to sign off at this time.         Chief Complaint: \"feel much better\"    SUBJECTIVE    Urvashi Tse is af, vss, states she feels significantly better today with resolution of abdominal pain. Denies abdominal pain, nausea, vomiting.      OBJECTIVE  VITALS: Temp: Temp: 97.5 °F (36.4 °C)Temp  Av °F (36.7 °C)  Min: 97.3 °F (36.3 °C)  Max: 98.8 °F (37.1 °C) BP Systolic (24hrs), Av , Min:136 , Max:176   Diastolic (24hrs), Av, Min:64, Max:94   Pulse Pulse  Av.3  Min: 73  Max: 87 Resp Resp  Avg: 15.6  Min: 12  Max: 17 Pulse ox SpO2  Av.4 %  Min: 97 %  Max: 100 %  GENERAL: alert, no distress  NEURO: GCS 15  HEENT: normocephalic, no central cyanosis  : deferred  LUNGS: Normal work of breathing on room air  HEART: normal rate and regular rhythm  ABDOMEN: Soft nontender, nondistended  EXTREMITY: no cyanosis, clubbing or edema    I/O last 3 completed shifts:  In: 780 [P.O.:80; I.V.:700]  Out: 210 [Urine:210]    Drain/tube 
    [facility]   Gastroenterology Progress Note    Urvashi Tse is a 80 y.o. female patient.  Hospitalization Day:2      Chief consult reason:   Transaminitis    Subjective:  Patient seen and examined, Patient continues with severe back pain limiting mobility, no severe acute events overnight.   LFTs continue to improve with alk phos 472, , , T. bili from 2.9-1.4, INR 1.2  MRI MRCP consistent with intra and extrahepatic biliary ductal dilatation, no choledocholithiasis or obstructing mass identified, distended gallbladder with small stone and layering sludge, known left psoas abscess, trace pleural effusion  VITALS:  BP (!) 164/63   Pulse 74   Temp 97.9 °F (36.6 °C) (Oral)   Resp 18   Ht 1.6 m (5' 3\")   Wt 61.1 kg (134 lb 11.2 oz)   LMP  (LMP Unknown)   SpO2 99%   BMI 23.86 kg/m²   TEMPERATURE:  Current - Temp: 97.9 °F (36.6 °C); Max - Temp  Av.3 °F (36.8 °C)  Min: 97.9 °F (36.6 °C)  Max: 98.6 °F (37 °C)    Physical Assessment:  General appearance: Very uncomfortable  Mental Status:  oriented to person, place and time and normal affect  Lungs:  clear to auscultation bilaterally, normal effort  Heart:  regular rate and rhythm, no murmur  Abdomen:  soft, nontender, nondistended, normal bowel sounds, no masses, hepatomegaly, splenomegaly severe back pain  Extremities:  no edema, redness, tenderness in the calves  Skin:  no gross lesions, rashes, induration    Data Review:    Labs and Imagin/3/2025 MRI MRCP with and without contrast  IMPRESSION:  1. Mild enlargement of the intrahepatic and extrahepatic biliary tree, new  compared to recent ultrasound exam. No choledocholithiasis or obstructing  mass identified. This may represent recently passed stone.  2. Distended gallbladder with a small stone and layering sludge. No wall  thickening appreciated.  3. Partially visualize known left psoas abscess, incompletely evaluated on  this exam.  Follow-up with contrast-enhanced 
    Today's Date: 7/7/2025  Patient Name: Urvashi Tse  Date of admission: 6/30/2025 10:43 PM  Patient's age: 80 y.o., 1945  Admission Dx: Epidural abscess [G06.2]  Transaminitis [R74.01]  Vertebral osteomyelitis (HCC) [M46.20]    Reason for Consult: management recommendations  Requesting Physician: No admitting provider for patient encounter.    CHIEF COMPLAINT:  lung mass    INTERIM HISTORY  Patient seen and examined at bedside.  Chart, labs and vitals reviewed.  No acute overnight events.  GI planning EGD today due to a positive stool for occult blood  General Surgery planning for lap ryan today  LFTs downtrending  Hemoglobin 7.5 from 8.5 yesterday  She tells me that she is \"uncomfortable\"    HISTORY OF PRESENT ILLNESS:      The patient is a 80 y.o.  female who is admitted to the hospital chief complaints of back pain.  CT scan showed discitis and vertebral osteomyelitis with left psoas muscle and epidural abscess.  Blood cultures positive for MSSA.  TTE negative for vegetations.  CT chest shows left lower lobe pulmonary nodule measuring 2.5 cm with necrotic and possibly neoplasm.  Also shows possibly cavitated nodule in the left upper lobe.  Metastatic disease in the differential.  Oncology team consulted due to finding of lung nodule with necrosis and possible malignancy.  Patient has remote history of tobacco use.    Lab workup shows WBC count 22,000 hemoglobin 10.0 and platelet count 383.  LFTs abnormal but now improving.      Past Medical History:   has a past medical history of COPD (chronic obstructive pulmonary disease) (HCC), Diabetes mellitus (HCC), Hyperlipidemia, Hypertension, and Lichen planus.    Past Surgical History:   has a past surgical history that includes joint replacement; Colonoscopy (03/21/2016); Dental surgery; Lithotripsy; and CT ABSCESS DRAINAGE (7/2/2025).     Medications:    Prior to Admission medications    Medication Sig Start Date End Date Taking? Authorizing 
    Today's Date: 7/8/2025  Patient Name: Urvashi Tse  Date of admission: 6/30/2025 10:43 PM  Patient's age: 80 y.o., 1945  Admission Dx: Epidural abscess [G06.2]  Transaminitis [R74.01]  Vertebral osteomyelitis (HCC) [M46.20]    Reason for Consult: management recommendations  Requesting Physician: Mando Paul DO    CHIEF COMPLAINT:  lung mass    INTERIM HISTORY  Patient seen and examined at bedside.  Chart, labs and vitals reviewed.  She remains afebrile.  She tells me that she is having increased dizziness and overall feels tired today  She underwent EUS, EGD (few antral erosions) and laparoscopic cholecystectomy yesterday  LFTs continue to downtrend  Hemoglobin stable at 8.0    HISTORY OF PRESENT ILLNESS:      The patient is a 80 y.o.  female who is admitted to the hospital chief complaints of back pain.  CT scan showed discitis and vertebral osteomyelitis with left psoas muscle and epidural abscess.  Blood cultures positive for MSSA.  TTE negative for vegetations.  CT chest shows left lower lobe pulmonary nodule measuring 2.5 cm with necrotic and possibly neoplasm.  Also shows possibly cavitated nodule in the left upper lobe.  Metastatic disease in the differential.  Oncology team consulted due to finding of lung nodule with necrosis and possible malignancy.  Patient has remote history of tobacco use.    Lab workup shows WBC count 22,000 hemoglobin 10.0 and platelet count 383.  LFTs abnormal but now improving.      Past Medical History:   has a past medical history of COPD (chronic obstructive pulmonary disease) (HCC), Diabetes mellitus (HCC), Hyperlipidemia, Hypertension, and Lichen planus.    Past Surgical History:   has a past surgical history that includes joint replacement; Colonoscopy (03/21/2016); Dental surgery; Lithotripsy; CT ABSCESS DRAINAGE (07/02/2025); Cholecystectomy, laparoscopic (N/A, 07/07/2025); and ERCP (N/A, 07/07/2025).     Medications:    Prior to Admission 
    Today's Date: 7/9/2025  Patient Name: Urvashi Tse  Date of admission: 6/30/2025 10:43 PM  Patient's age: 80 y.o., 1945  Admission Dx: Epidural abscess [G06.2]  Transaminitis [R74.01]  Vertebral osteomyelitis (HCC) [M46.20]    Reason for Consult: management recommendations  Requesting Physician: Mando Paul DO    CHIEF COMPLAINT:  lung mass    INTERIM HISTORY  Patient seen and examined while sitting up in a chair  Chart, labs and vitals reviewed.  She remains afebrile.  She is recovering well from her laparoscopic cholecystectomy  Labs are stable, hemoglobin 8.6  Slight thrombocytosis at 546 which is reactive  She endorses right shoulder pain. Dizziness has improved with avoidance of narcotics  Discharge planning in process    HISTORY OF PRESENT ILLNESS:      The patient is a 80 y.o.  female who is admitted to the hospital chief complaints of back pain.  CT scan showed discitis and vertebral osteomyelitis with left psoas muscle and epidural abscess.  Blood cultures positive for MSSA.  TTE negative for vegetations.  CT chest shows left lower lobe pulmonary nodule measuring 2.5 cm with necrotic and possibly neoplasm.  Also shows possibly cavitated nodule in the left upper lobe.  Metastatic disease in the differential.  Oncology team consulted due to finding of lung nodule with necrosis and possible malignancy.  Patient has remote history of tobacco use.    Lab workup shows WBC count 22,000 hemoglobin 10.0 and platelet count 383.  LFTs abnormal but now improving.      Past Medical History:   has a past medical history of COPD (chronic obstructive pulmonary disease) (HCC), Diabetes mellitus (HCC), Hyperlipidemia, Hypertension, and Lichen planus.    Past Surgical History:   has a past surgical history that includes joint replacement; Colonoscopy (03/21/2016); Dental surgery; Lithotripsy; CT ABSCESS DRAINAGE (07/02/2025); Cholecystectomy, laparoscopic (N/A, 07/07/2025); ERCP (N/A, 07/07/2025); 
  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  L2/3 discitis/osteomyelitis with associated epidural thickening/enhancement consistent with epidural abscess from the L1-L2 disc level to L3-L4 disc level and associated left psoas abscess  Status post IR guided drainage 7/2/2025  Culture with Staphylococcus aureus  Left lower lobe pulmonary nodule measuring 2.5 cm, necrotic and partially cavitary nodule in the left upper lobe  Suspect thromboembolic disease/septic emboli  Elevated LFT's  Acute transaminitis with baseline abnormal LFTs concern for drug-induced hepatitis versus obstructive disease  Improved  Diabetes mellitus type 2  Essential hypertension  Hyperlipidemia  COPD  Recent left lower extremity cellulitis    Recommendations:   The patient continues on intravenous antimicrobial therapy with cefazolin  Given the improvement in the transaminases as well as the findings of cholestasis and possible cholelithiasis this is not likely drug-induced  The patient has now developed some mild ROXANNA and therefore I will discontinue the liposomal amphotericin B and resume micafungin  We will continue to follow the transaminase trend  I will plan on repeat blood cultures x 2 sets tomorrow  We will follow her clinical progress and adjust therapy accordingly    Infection Control Recommendations:   Universal precautions    Discharge Planning:   Estimated Length of IV antimicrobials: To be determined  Patient will need Midline Catheter Insertion/ PICC line Insertion: No  Patient will need: Home IV , Infusion Center,  SNF,  LTAC: Undetermined  Patient willneed outpatient wound care: No    Medical Decision making / Summary of Stay:   Urvashi Tse is a 80 y.o.-year-old female who was 
  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  L2/3 discitis/osteomyelitis with associated epidural thickening/enhancement consistent with epidural abscess from the L1-L2 disc level to L3-L4 disc level and associated left psoas abscess  Status post IR guided drainage 7/2/2025  Culture with Staphylococcus aureus  Left lower lobe pulmonary nodule measuring 2.5 cm, necrotic and partially cavitary nodule in the left upper lobe  Suspect thromboembolic disease/septic emboli  Elevated LFT's  Acute transaminitis with baseline abnormal LFTs concern for drug-induced hepatitis versus obstructive disease  Improved  Diabetes mellitus type 2  Essential hypertension  Hyperlipidemia  COPD  Recent left lower extremity cellulitis    Recommendations:   The patient continues on intravenous antimicrobial therapy with cefazolin  Given the improvement in the transaminases as well as the findings of cholestasis and possible cholelithiasis this is not likely drug-induced  The patient has now developed some mild ROXANNA and therefore I will discontinue the liposomal amphotericin B and resume micafungin  We will continue to follow the transaminase trend  I will plan on repeat blood cultures x 2 sets on 7-5-25  We will follow her clinical progress and adjust therapy accordingly    Infection Control Recommendations:   Universal precautions    Discharge Planning:   Estimated Length of IV antimicrobials: To be determined  Patient will need Midline Catheter Insertion/ PICC line Insertion: No  Patient will need: Home IV , Infusion Center,  SNF,  LTAC: Undetermined  Patient willneed outpatient wound care: No    Medical Decision making / Summary of Stay:   Urvashi Tse is a 80 y.o.-year-old female who was 
  [facility]  Progress Note    Urvashi Tse  MRN: [id number]  Date: 7/2/2025  LOS: 1     Reason for F/U :   MSSA sepsis, candida tropicalis fungemia    Impression :   MSSA  sepsis  Candida tropicalis fungemia  L2/3 discitis/osteomyelitis with associated epidural thickening/enhancement consistent with epidural abscess from the L1-L2 disc level to L3-L4 disc level and associated left psoas abscess  Elevated LFT's  Diabetes mellitus type 2  Essential hypertension  Hyperlipidemia  COPD  Recent left lower extremity cellulitis    Recommendations:   Imaging consistent with discitis/osteomyelitis with associated epidural abscess/psoas abscess  Patient is currently on nafcillin for the MSSA sepsis which would explain the above  Patient continues on micafungin for the Candida tropicalis fungemia, source is not entirely clear  The patient's liver enzymes have tripled since yesterday. I would not suspect this is related to the nafcillin; however considering the increase and both antimicrobials were initiated yesterday, will discontinue both the nafcillin and micafungin as we are not able to say for sure if it was related to one of these.  Will place the patient on IV cefazolin and ask pharmacy to dose liposomal amphotericin B for the fungemia  CT chest has been ordered  Echocardiogram has been ordered to evaluate for endocarditis  No plans for acute surgical intervention per the neurosurgery team, they do plan for a 6-week follow-up  We will continue to trend the inflammatory markers as these were quite elevated.  Repeat CRP and sed rate ordered for this afternoon  Discussed with Dr. Lucas    Infection Control Recommendations:   No new imaging    Discharge Planning:   Estimated Length of IV antimicrobials: to be determined  Patient will need Midline Catheter Insertion/ PICC line Insertion: No  Patient will need: Home IV , Infusion Center,  SNF,  LTAC: Undetermined  Patient willneed outpatient wound care: 
  [facility]  Progress Note    Urvashi Tse  MRN: [id number]  Date: 7/8/2025  LOS: 7     Reason for F/U :   MSSA sepsis, candida tropicalis fungemia    Impression :   MSSA  sepsis, 6/30/2025, 7/1/2025  Candida tropicalis fungemia - 1 of 2 sets, 6/30/2025  L2/3 discitis/osteomyelitis with associated epidural thickening/enhancement consistent with epidural abscess from the L1-L2 disc level to L3-L4 disc level and associated left psoas abscess  Status post IR guided drainage 7/2/2025  Culture with methicillin susceptible Staphylococcus aureus  Cholelithiasis, choledocholithiasis  Status post laparoscopic cholecystectomy 7/7/2025  Left lower lobe pulmonary nodule measuring 2.5 cm, necrotic and partially cavitary nodule in the left upper lobe  Suspect thromboembolic disease/septic emboli  Elevated LFT's  Acute transaminitis with baseline abnormal LFTs concern for drug-induced hepatitis versus obstructive disease-improving  Diabetes mellitus type 2  Essential hypertension  Hyperlipidemia  COPD  Recent left lower extremity cellulitis    Recommendations:   Patient received nafcillin 7/1/2025 and 7/2/2025  Patient received micafungin 7/2/2025 then was transitioned to amphotericin B 7/2/2025 and 7/3/2025 due to concern for drug-induced transaminitis.  Patient was transitioned back to micafungin 7/4/2025 due to slight increased in creatinine  Patient continues on IV antimicrobial therapy with cefazolin  Repeat blood culture remains no growth, 7/5/2025  Patient underwent EGD/EUS yesterday, EGD with few antral erosions, EUS normal  Patient did undergo laparoscopic cholecystectomy with the general surgery team yesterday  Patient will need a 6-week course of IV antimicrobial therapy  Original plan was for patient to go home with home IV, family is now contemplating sending patient to a facility    Infection Control Recommendations:   No new imaging    Discharge Planning:   Estimated Length of IV antimicrobials: 
  [facility]  Progress Note    Urvashi Tse  MRN: [id number]  Date: 7/7/2025  LOS: 6     Reason for F/U :   MSSA sepsis, candida tropicalis fungemia    Impression :   MSSA  sepsis, 6/30/2025, 7/1/2025  Candida tropicalis fungemia - 1 of 2 sets, 6/30/2025  L2/3 discitis/osteomyelitis with associated epidural thickening/enhancement consistent with epidural abscess from the L1-L2 disc level to L3-L4 disc level and associated left psoas abscess  Status post IR guided drainage 7/2/2025  Culture with methicillin susceptible Staphylococcus aureus  Left lower lobe pulmonary nodule measuring 2.5 cm, necrotic and partially cavitary nodule in the left upper lobe  Suspect thromboembolic disease/septic emboli  Elevated LFT's  Acute transaminitis with baseline abnormal LFTs concern for drug-induced hepatitis versus obstructive disease-improving  Diabetes mellitus type 2  Essential hypertension  Hyperlipidemia  COPD  Recent left lower extremity cellulitis    Recommendations:   Patient continues on IV antimicrobial therapy with cefazolin  Patient did have acute transaminitis and concern was for drug induced hepatitis so patient was transitioned from micafungin to liposomal amphotericin B; however, given improvement in the transaminases as well as findings of cholestasis and possible lithiasis, this was not likely drug-induced.  Due to mild acute kidney injury, liposomal amphotericin B was discontinued and micafungin was resumed  Repeat blood cultures remain no growth, 7/5/2025  Patient is currently n.p.o. for EGD to evaluate for the source of the anemia and melena, also planning for EUS to assess for any CBD stone  We will continue to follow her clinical progress and adjust therapy accordingly     Infection Control Recommendations:   No new imaging    Discharge Planning:   Estimated Length of IV antimicrobials: to be determined  Patient will need Midline Catheter Insertion/ PICC line Insertion: No  Patient will 
[facility] Gastroenterology Progress Note    Urvashi Tse is a 80 y.o. female patient.  Hospitalization Day:7      Chief consult reason: GI consulted for transaminitis and signed off.  Reconsulted for anemia/GI bleed      Subjective: Patient seen and examined.  No acute events reported overnight.    Patient tolerating diet.      Objective:   VITALS:  /68   Pulse 77   Temp 98.4 °F (36.9 °C) (Oral)   Resp 18   Ht 1.6 m (5' 2.99\")   Wt 58.5 kg (129 lb)   LMP  (LMP Unknown)   SpO2 98%   BMI 22.86 kg/m²   TEMPERATURE:  Current - Temp: 98.4 °F (36.9 °C); Max - Temp  Av °F (36.7 °C)  Min: 97.5 °F (36.4 °C)  Max: 98.6 °F (37 °C)    Physical Assessment:  General appearance:  alert, cooperative and no distress  Mental Status:  oriented to person, place and time and normal affect  Lungs:  clear to auscultation bilaterally, normal effort  Heart:  regular rate and rhythm, no murmur appreciated  Abdomen:  soft, nontender, nondistended, normal bowel sounds, no masses  Extremities:  no edema, no redness, No clubbing  Skin:  warm, dry, no gross lesions or rashes    CURRENT MEDICATIONS:  Scheduled Meds:   atorvastatin  80 mg Oral Nightly    amLODIPine  5 mg Oral Daily    pantoprazole  40 mg Oral BID AC    QUEtiapine  25 mg Oral Nightly    micafungin  100 mg IntraVENous Daily    ceFAZolin  2,000 mg IntraVENous Q8H    sodium chloride flush  5-40 mL IntraVENous 2 times per day    enoxaparin  40 mg SubCUTAneous Daily    insulin lispro  0-4 Units SubCUTAneous 4x Daily AC & HS    lidocaine  1 patch TransDERmal Daily    metoprolol succinate  12.5 mg Oral Daily    sertraline  100 mg Oral Daily     Continuous Infusions:   dextrose Stopped (25 0407)    dextrose      sodium chloride       PRN Meds:meclizine, oxyCODONE, melatonin, acetaminophen, HYDROmorphone, dextrose, glucose, dextrose bolus **OR** dextrose bolus, glucagon (rDNA), dextrose, sodium chloride flush, sodium chloride, potassium chloride **OR** 
[facility]  Office: [phone]  Gerald Perrin, DO, Chris Adhikari, DO, Filemon Aguilera DO, López Ochoa, DO, Fatemeh Kyle MD, Cindi Leos MD, Al Camargo MD, Joanne Ruiz MD,  Levi Toledo MD, Catherine Valentine MD, Charli Gama MD,  Estevan West DO, Kofi Cervantes MD, Isac Choi MD, Brian Perrin DO, Sheri Wing MD,  Mando Paul DO, Chelle Shah MD, Jennifer Dotson MD, Von Connell MD,  Jack Moreno MD, Anastasiia Gonzales MD, Sherice Chopra MD, Bernardo Maldonado MD, Eliseo Rangel MD, Ford Pruitt MD, Andrew Mary, DO, Ines Weeks MD, Henny Morrissey DO, Edouard Ricardo MD, Estevan Herrera MD, Mohsin Reza, MD, Brett Calero MD, Shirley Waterhouse, CNP,  Dionne Gaston, CNP, Andrew Tobias, CNP,  Jaelyn Calderon, DELFIN, Chyna Pascual, CNP, Suzy Smith, CNP, Kaelyn Valentin, CNP, Radha Minor, CNP, Victoria Gibbs, PA-C, Moni Perez, CNP, Shreya Taylor, CNP,  Malena Cerda, CNP, Digna Zepeda, CNP, Jesús Sim, PA-C, Becki Melendez, PA-C, Liz Barrow, CNP,        Kiersten Feliz, CNS, Lani Costa, CNP, Diana Cm, CNP         [facility]   IN-PATIENT SERVICE   [facility]    Progress Note    7/2/2025    2:57 PM    Name:   Urvashi Tse  MRN:     [id number]     Acct:      [id number]   Room:   0345/0345-02   Day:  1  Admit Date:  6/30/2025 10:43 PM    PCP:   Camden Fields ND  Code Status:  Full Code    Subjective:     She is uncomfortable.  Pain in her back.  Otherwise no fevers or chills.  No nausea vomiting diarrhea.  Feels very weak AST/ALT significantly worse today. She also complained of dysuria       Brief History:   80-year-old female presented to the hospital for evaluation of back pain.  CT scan showed findings consistent with discitis and vertebral osteomyelitis with left psoas abscess and epidural abscess.  She was transferred to our facility for neurosurgery evaluation.  Neurosurgery did not recommend any surgical 
[facility]  Office: [phone]  Gerald Perrin, DO, Chris Adhikari, DO, Filemon Aguilera DO, López Ochoa, DO, Fatemeh Kyle MD, Cindi Leos MD, Al Camargo MD, Joanne Ruiz MD,  Levi Toledo MD, Catherine Valentine MD, Charli Gama MD,  Estevan West DO, Kofi Cervantes MD, Isac Choi MD, Brian Perrin DO, Sheri Wing MD,  Mando Paul DO, Chelle Shah MD, Jennifer Dotson MD, Von Connell MD,  Jack Moreno MD, Anastasiia Gonzales MD, Sherice Chopra MD, Bernardo Maldonado MD, Eliseo Rangel MD, Ford Pruitt MD, Andrew Mary, DO, Ines Weeks MD, Henny Morrissey DO, Edouard Ricardo MD, Estevan Herrera MD, Mohsin Reza, MD, Brett Calero MD, Shirley Waterhouse, CNP,  Dionne Gaston, CNP, Andrew Tobias, CNP,  Jaelyn Calderon, DELFIN, Chyna Pascual, CNP, Suzy Smith, CNP, Kaelyn Valentin, CNP, Radha Minor, CNP, Victoria Gibbs, PA-C, Moni Perez, CNP, Shreya Taylor, CNP,  Malena Cedra, CNP, Digna Zepeda, CNP, Jesús Sim, PA-C, Becki Melendez, PA-C, Liz Barrow, CNP,        Kiersten Feliz, CNS, Lani Costa, CNP, Diana Cm, CNP         [facility]   IN-PATIENT SERVICE   [facility]    Progress Note    7/7/2025    7:30 AM    Name:   Urvashi Tse  MRN:     [id number]     Acct:      [id number]   Room:   0345/0345-02   Day:  6  Admit Date:  6/30/2025 10:43 PM    PCP:   Camden Fields ND  Code Status:  Full Code    Subjective:     Vitals reviewed, afebrile Tmax 99.1 and hemodynamically stable. Saturating well on room room air.  Labs reviewed, mild hyponatremia 133, hypokalemia 3.4, LFTs continue to downtrend, leukocytosis has resolved, hemoglobin 7.5 from 8.5.  Occult blood testing positive.  Blood culture positive x 2 for MSSA on 7/1/2025.  Repeat blood culture from 7/5/2025 negative x 2 days.  Anaerobic and aerobic culture from left psoas muscle positive for MSSA.  Overnight patient had no significant events.    On examination patient 
[facility]   Pharmacy Pharmacokinetic Monitoring Service - Vancomycin     Urvashi Tse is a 80 y.o. female starting on vancomycin therapy for epidural abscess. Pharmacy consulted by Diana Cm, APR  for monitoring and adjustment.    Target Concentration: Goal AUC/DEBORAH 400-600 mg*hr/L    Additional Antimicrobials: zosyn    Pertinent Laboratory Values:   Wt Readings from Last 1 Encounters:   06/30/25 57.6 kg (127 lb)     Temp Readings from Last 1 Encounters:   06/30/25 98.9 °F (37.2 °C) (Oral)     Estimated Creatinine Clearance: 43 mL/min (based on SCr of 0.9 mg/dL).  Recent Labs     06/30/25  1100   CREATININE 0.9   BUN 19   WBC 14.6*          Pertinent Cultures:  Culture Date Source Results           MRSA Nasal Swab: N/A. Non-respiratory infection.    Plan:  Dosing recommendations based on Bayesian software  Start vancomycin 1000 mg x1 dose followed by 500 mg every 12 horus  Anticipated AUC of 517 and trough concentration of 16.7 at steady state  Renal labs as indicated   Vancomycin concentration ordered for   @     Pharmacy will continue to monitor patient and adjust therapy as indicated    Thank you for the consult,  Manuelito Fonseca RPH  7/1/2025 3:29 AM    
[facility]  Office: [phone]  Gerald Perrin, DO, Chris Adhikari, DO, Filemon Aguilera DO, López Ochoa, DO, Fatemeh Kyle MD, Cindi Leos MD, Al Camargo MD, Joanne Ruiz MD,  Levi Toledo MD, Cathreine Valentine MD, Charli Gama MD,  Estevan West DO, Kofi Cervantes MD, Isac Choi MD, Brian Perrin DO, Sheri Wing MD,  Mando Paul DO, Chelle Shah MD, Jennifer Dotson MD, Von Connell MD,  Jack Moreno MD, Anastasiia Gonzales MD, Sherice Chopra MD, Bernardo Maldonado MD, Eliseo Rangel MD, Ford Pruitt MD, Andrew Mary, DO, Ines Weeks MD, Henny Morrissey DO, Edouard Ricardo MD, Estevan Herrera MD, Mohsin Reza, MD, Brett Calero MD, Shirley Waterhouse, CNP,  Dionne Gaston, CNP, Andrew Tobias, CNP,  Jaelyn Calderon, DELFIN, Chyan Pascual, CNP, Suzy Smith, CNP, Kaelyn Valentin, CNP, Radha Minor, CNP, Victoria Gibbs, PA-C, Moni Perez, CNP, Shreya Taylor, CNP,  Malena Cerda, CNP, Digna Zepeda, CNP, Jesús Sim, PA-C, Becki Melendez, PA-C, Liz Barrow, CNP,        Kiersten Feliz, CNS, Lani Costa, CNP, Diana Cm, CNP         [facility]   IN-PATIENT SERVICE   [facility]    Progress Note    7/3/2025    11:38 AM    Name:   Urvashi Tse  MRN:     [id number]     Acct:      [id number]   Room[facility]   [facility] Day:  2  Admit Date:  6/30/2025 10:43 PM    PCP:   Camden Fields ND  Code Status:  Full Code    Subjective:     Patient very uncomfortable appearing.  Having pain in right upper quadrant.  Also having pain in her back.  LFTs are downtrending      Brief History:   80-year-old female presented to the hospital for evaluation of back pain.  CT scan showed findings consistent with discitis and vertebral osteomyelitis with left psoas abscess and epidural abscess.  She was transferred to our facility for neurosurgery evaluation.  Neurosurgery did not recommend any surgical intervention.  Patient's blood cultures did come back 
Nutrition Assessment     Type and Reason for Visit: Initial, LOS    Nutrition Recommendations/Plan:   Continue current diet.     Malnutrition Assessment:  Malnutrition Status: No malnutrition    Nutrition Assessment:  Pt admit with epidural abscess. Seen for LOS. PMH: COPD, DM, HLD, HTN. Pt had cholecystectomy and EGD yesterday. Average intakes per chart of 50-75%. Pt states appetite was decreased for a week PTA but now close to returning to baseline. Pt declines ONS states she is eating enough. Pt states normal body wt of 127#, current wt of 129#. Stable wt. Pt and family denies any nutrition related questions or concerns.    Nutrition Related Findings:   Trace edema BLE. LBM 7/5. Labs/meds reviewed. Wound Type: Surgical Incision    Current Nutrition Therapies:    ADULT DIET; Regular; Low Fat/Low Chol/High Fiber/AC    Anthropometric Measures:  Height: 160 cm (5' 2.99\")  Current Body Wt: 58.5 kg (128 lb 15.5 oz)   BMI: 22.9        Nutrition Diagnosis:   No nutrition diagnosis at this time       Nutrition Interventions:   Food and/or Nutrient Delivery: Continue Current Diet    Discharge Planning:    No discharge needs at this time     [address]  Contact: [phone]   Mobile: [phone]      
Occupational Therapy    [facility]  Occupational Therapy Not Seen Note    DATE: 2025    NAME: Urvashi Tse  MRN: [id number]   : 1945      Patient not seen this date for Occupational Therapy due to:    Testing: Attempted to see patient this AM, per RN patient off floor at CT. Check back in PM as time permits or 7/3.      Electronically signed by Rachel Waite OT on 2025 at 11:20 AM   
Occupational Therapy    [facility]  Occupational Therapy Not Seen Note    DATE: 2025    NAME: Urvashi Tse  MRN: [id number]   : 1945      Patient not seen this date for Occupational Therapy due to:        Pt not seen for OT treatment this date due to patient off floor for CHOLECYSTECTOMY LAPAROSCOPIC, EUS, ERCP. Will continue as able.     Electronically signed by GEOVANY You on 2025 at 12:22 PM   
Occupational Therapy  Occupational Therapy Daily Treatment Note  Facility/Department: [address] MED SURG   Patient Name: Urvashi Tse        MRN: [id number]    : 1945    Date of Service: 2025    Chief Complaint   Patient presents with    Back Pain     Past Medical History:  has a past medical history of COPD (chronic obstructive pulmonary disease) (HCC), Diabetes mellitus (HCC), Hyperlipidemia, Hypertension, and Lichen planus.  Past Surgical History:  has a past surgical history that includes joint replacement; Colonoscopy (2016); Dental surgery; Lithotripsy; CT ABSCESS DRAINAGE (2025); Cholecystectomy, laparoscopic (N/A, 2025); ERCP (N/A, 2025); Cholecystectomy, laparoscopic (N/A, 2025); Upper gastrointestinal endoscopy (N/A, 2025); and Upper gastrointestinal endoscopy (N/A, 2025).    Discharge Recommendations  Discharge Recommendations: Patient would benefit from continued therapy after discharge  OT Equipment Recommendations  ADL Assistive Devices: Sock-Aid Hard;Reacher;Long-handled Sponge;Long-handled Shoe Horn    Assessment  Performance deficits / Impairments: Decreased functional mobility ;Decreased strength;Decreased endurance;Decreased ADL status;Decreased safe awareness;Decreased ROM;Decreased balance  Assessment: Pt limited by above deficits impacting ADL completion, ADL transfers and safe functional mobility to maximize pts potential and quality of life, continue with skilled OT during acute hospital stay and post discharge to decrease caregiver burden and enable pt to return home at maximum level of function.  Prognosis: Good  Activity Tolerance  Activity Tolerance: Patient limited by endurance, Patient tolerated treatment well  Activity Tolerance Comments: Requires seated rest breaks.  Safety Devices  Type of Devices: Nurse notified, Gait belt, Patient at risk for falls, Left in chair    AM-PAC  AM-PAC Daily Activity - Inpatient   How much help is needed for 
Occupational Therapy  Occupational Therapy Initial Evaluation  Facility/Department: [address] MED SURG   Patient Name: Urvashi Tse        MRN: [id number]    : 1945    Date of Service: 7/3/2025    Chief Complaint   Patient presents with    Back Pain     Past Medical History:  has a past medical history of COPD (chronic obstructive pulmonary disease) (HCC), Diabetes mellitus (HCC), Hyperlipidemia, Hypertension, and Lichen planus.  Past Surgical History:  has a past surgical history that includes joint replacement; Colonoscopy (2016); Dental surgery; and Lithotripsy.    Discharge Recommendations  Discharge Recommendations: Patient would benefit from continued therapy after discharge  OT Equipment Recommendations  Equipment Needed: Yes  Mobility Devices: ADL Assistive Devices  ADL Assistive Devices: Sock-Aid Hard, Reacher, Long-handled Sponge, Long-handled Shoe Horn    Assessment  Performance deficits / Impairments: Decreased functional mobility ;Decreased strength;Decreased endurance;Decreased ADL status;Decreased safe awareness;Decreased ROM;Decreased balance  Assessment: Pt agreed to occupational therapy evaluation with education provided on purpose and role of occupational therapy services in the acute care setting. OT facilitated assessing functional mobility and ADL performance to pt's tolerance this visit. Pt exhibited requiring modAx1 for bed mobility, modAx1 for functional sit<>stand transfers and modAx2 for functional mobility. Transfers/mobility completed utilizing RW. Greatest limitations exhibited during these activities include nausea and generalized weakness. OT facilitated grooming and LB dressing and pt required setup with SBA and TA to complete, see below for more details. Based on the patients occupational performance throughout this evaluation, it is expected they are to require continued skilled OT services during their acute hospitilization to increase safety and promote increased 
Physical Therapy        Physical Therapy Cancel Note      DATE: 2025    NAME: Urvashi Tse  MRN: [id number]   : 1945      Patient not seen this date for Physical Therapy due to:    Patient is not appropriate for PT treatment at this time d/t pain and extreme fatigue.  Ck back 25.        Electronically signed by ZHENG GOMEZ PTA on 2025 at 11:07 AM      
Physical Therapy        Physical Therapy Cancel Note      DATE: 2025    NAME: Urvashi Tse  MRN: [id number]   : 1945      Patient not seen this date for Physical Therapy due to:    Surgery/Procedure: Pt currently in IR for CHOLECYSTECTOMY LAPAROSCOPIC, EUS, ERCP . Pt was approached this am but declined session due to pain and fatigue. Will check back as able.       Electronically signed by Thomas Mercado PTA on 2025 at 12:26 PM     
Physical Therapy        Physical Therapy Cancel Note      DATE: 2025    NAME: Urvashi Tse  MRN: [id number]   : 1945      Patient not seen this date for Physical Therapy due to:    Patient Declined: 2* extreme pain and fatigue.        Electronically signed by ZHENG GOMEZ PTA on 2025 at 11:42 AM      
Physical Therapy        Physical Therapy Cancel Note      DATE: 7/10/2025    NAME: Urvashi Tse  MRN: [id number]   : 1945      Patient not seen this date for Physical Therapy due to:    Patient Declined: Pt politely declined session on morning attempt. Pt was sitting on the commode and trying to have a BM, pt states she feels weak all over as \"everyone is coming at me at once\". Pt educated that participation in therapy is necessary for recovery. Daughter present during education, RN notified. Will check back as able.       Electronically signed by Thomas Mercado PTA on 7/10/2025 at 9:59 AM     
Physical Therapy  Facility/Department: [address] MED SURG   Physical Therapy Initial Evaluation    Patient Name: Urvashi Tse        MRN: [id number]    : 1945    Date of Service: 2025    Chief Complaint   Patient presents with    Back Pain     Past Medical History:  has a past medical history of COPD (chronic obstructive pulmonary disease) (HCC), Diabetes mellitus (HCC), Hyperlipidemia, Hypertension, and Lichen planus.  Past Surgical History:  has a past surgical history that includes joint replacement; Colonoscopy (2016); Dental surgery; and Lithotripsy.    Discharge Recommendations  Discharge Recommendations: Patient would benefit from continued therapy after discharge       Assessment  Body Structures, Functions, Activity Limitations Requiring Skilled Therapeutic Intervention: Decreased functional mobility , Decreased strength, Decreased endurance, Decreased balance  Assessment: The pt ambulated 12 ft with a RW x min assist. She became dizzy and had to be returned to the  bed.She could benefit from a continuation of PT for gait , strengthening and functional mobility prior to her DC.  Therapy Prognosis: Good  Decision Making: Medium Complexity  Requires PT Follow-Up: Yes  Activity Tolerance  Activity Tolerance: Patient limited by fatigue, Patient limited by endurance  Safety Devices  Type of Devices: Nurse notified, Left in bed, Gait belt, Call light within reach, Bed alarm in place, Patient at risk for falls  Restraints  Restraints Initially in Place: No    AM-PAC  AM-PAC Basic Mobility - Inpatient   How much help is needed turning from your back to your side while in a flat bed without using bedrails?: A Little  How much help is needed moving from lying on your back to sitting on the side of a flat bed without using bedrails?: A Little  How much help is needed moving to and from a bed to a chair?: A Little  How much help is needed standing up from a chair using your arms?: A Little  How much 
Physical Therapy  Facility/Department: [address] MED SURG   Physical Therapy Daily Treatment Note    Patient Name: Urvashi Tse        MRN: [id number]    : 1945    Date of Service: 7/3/2025    Chief Complaint   Patient presents with    Back Pain     Past Medical History:  has a past medical history of COPD (chronic obstructive pulmonary disease) (HCC), Diabetes mellitus (HCC), Hyperlipidemia, Hypertension, and Lichen planus.  Past Surgical History:  has a past surgical history that includes joint replacement; Colonoscopy (2016); Dental surgery; and Lithotripsy.    Discharge Recommendations  Discharge Recommendations: Patient would benefit from continued therapy after discharge  PT Equipment Recommendations  Equipment Needed: No  Other: continue to assess based on pt progress    Assessment  Body Structures, Functions, Activity Limitations Requiring Skilled Therapeutic Intervention: Decreased functional mobility ;Decreased strength;Decreased endurance;Decreased balance  Assessment: Pt limited by nausea, pain and discomfort and dry heaving during session. Pt was able to complete sit to stand and bed to chair transfer with RW MOD Ax2 due to decreased balance, weakness and fatigue/lethargy. Pt would be unsafe to return to prior living arrangements due to decreased mobility, pain and discomfort, high fall risk and decreased balance. Pt would benefit from further therapy to address functional mobility deficits and establish safe DC plan.  Therapy Prognosis: Good  Activity Tolerance  Activity Tolerance: Patient limited by fatigue;Patient limited by endurance;Patient limited by pain  Safety Devices  Type of Devices: Nurse notified;Gait belt;Call light within reach;Patient at risk for falls;Left in chair;Chair alarm in place  Restraints  Restraints Initially in Place: No    AM-PAC  AM-PAC Basic Mobility - Inpatient   How much help is needed turning from your back to your side while in a flat bed without using 
Physical Therapy  Facility/Department: [address] MED SURG   Physical Therapy Daily Treatment Note    Patient Name: Urvashi Tes        MRN: [id number]    : 1945    Date of Service: 2025    Chief Complaint   Patient presents with    Back Pain     Past Medical History:  has a past medical history of COPD (chronic obstructive pulmonary disease) (HCC), Diabetes mellitus (HCC), Hyperlipidemia, Hypertension, and Lichen planus.  Past Surgical History:  has a past surgical history that includes joint replacement; Colonoscopy (2016); Dental surgery; Lithotripsy; and CT ABSCESS DRAINAGE (2025).    Discharge Recommendations  Discharge Recommendations: Patient would benefit from continued therapy after discharge  PT Equipment Recommendations  Equipment Needed: No  Other: continue to assess based on pt progress    Assessment  Body Structures, Functions, Activity Limitations Requiring Skilled Therapeutic Intervention: Decreased functional mobility ;Decreased strength;Decreased endurance;Decreased balance  Assessment: pt performs transfers from multipls surfaces with Gely-ModA x1 to RW. vc/tc for safety with hand placement. pt demonstrates ambulating with RW, Gely to performs 15ft x3 with seated rest breaks. pt demonstrates narrow CARMEN when ambulating, demonstrating 1 LOB, requiring assist to correct.  pt would benefit from continued therapy to address fuctional mobility deficits and establish safe discharge plan.  Therapy Prognosis: Good  Activity Tolerance  Activity Tolerance: Patient limited by fatigue;Patient limited by endurance;Patient tolerated treatment well  Activity Tolerance Comments: Requires seated rest breaks.  Safety Devices  Type of Devices: Nurse notified;Gait belt;Call light within reach;Patient at risk for falls;Left in chair;Chair alarm in place  Restraints  Restraints Initially in Place: No    AM-PAC  AM-PAC Basic Mobility - Inpatient   How much help is needed turning from your back to your 
[facility] Gastroenterology Progress Note    Urvashi Tse is a 80 y.o. female patient.  Hospitalization Day:5      Chief consult reason: GI consulted for transaminitis and signed off.  Reconsulted for anemia/GI bleed      Subjective: Patient seen and examined.  Patient reports overall is doing well.  No rectal bleeding overnight  Hemoglobin stable      Objective:   VITALS:  BP (!) 152/68   Pulse 76   Temp 99 °F (37.2 °C) (Oral)   Resp 12   Ht 1.6 m (5' 3\")   Wt 59.6 kg (131 lb 6.3 oz)   LMP  (LMP Unknown)   SpO2 100%   BMI 23.28 kg/m²   TEMPERATURE:  Current - Temp: 99 °F (37.2 °C); Max - Temp  Av.7 °F (37.1 °C)  Min: 98.4 °F (36.9 °C)  Max: 99 °F (37.2 °C)    Physical Assessment:  General appearance:  alert, cooperative and no distress  Mental Status:  oriented to person, place and time and normal affect  Lungs:  clear to auscultation bilaterally, normal effort  Heart:  regular rate and rhythm, no murmur appreciated  Abdomen:  soft, nontender, nondistended, normal bowel sounds, no masses  Extremities:  no edema, no redness, No clubbing  Skin:  warm, dry, no gross lesions or rashes    CURRENT MEDICATIONS:  Scheduled Meds:   QUEtiapine  25 mg Oral Nightly    pantoprazole (PROTONIX) 40 mg in sodium chloride (PF) 0.9 % 10 mL injection  40 mg IntraVENous Q12H    micafungin  100 mg IntraVENous Daily    ceFAZolin  2,000 mg IntraVENous Q8H    sodium chloride flush  5-40 mL IntraVENous 2 times per day    [Held by provider] enoxaparin  40 mg SubCUTAneous Daily    [Held by provider] atorvastatin  80 mg Oral Daily    [Held by provider] losartan  50 mg Oral Daily    insulin lispro  0-4 Units SubCUTAneous 4x Daily AC & HS    lidocaine  1 patch TransDERmal Daily    metoprolol succinate  12.5 mg Oral Daily    sertraline  100 mg Oral Daily     Continuous Infusions:   dextrose Stopped (25 0407)    dextrose      sodium chloride       PRN Meds:oxyCODONE, acetaminophen, HYDROmorphone, dextrose, glucose, 
[facility]  Office: [phone]  Gerald Perrin, DO, Chris Adhikari, DO, Filemon Aguilera DO, López Ochoa, DO, Fatemeh Kyle MD, Cindi Leos MD, Al Camargo MD, Joanne Ruiz MD,  Levi Toledo MD, Catherine Valentine MD, Charli Gama MD,  Estevan West DO, Kofi Cervantes MD, Isac Choi MD, Brian Perrin DO, Sheri Wing MD,  Mando Paul DO, Chelle Shah MD, Jennifer Dotson MD, Von Connell MD,  Jack Moreno MD, Anastasiia Gonzales MD, Sherice Chopra MD, Bernardo Maldonado MD, Eliseo Rangel MD, Ford Pruitt MD, Andrew Mary, DO, Ines Weeks MD, Henny Morrissey DO, Edouard Ricardo MD, Estevan Herrera MD, Mohsin Reza, MD, Brett Calero MD, Shirley Waterhouse, CNP,  Dionne Gaston, CNP, Andrew Tobias, CNP,  Jaelyn Calderon, DELFIN, Chyna Pascual, CNP, Suzy Smith, CNP, Kaelyn Valentin, CNP, Radha Minor, CNP, Victoria Gibbs, PA-C, Moni Perez, CNP, Shreya Taylor, CNP,  Malena Cerda, CNP, Digna Zepeda, CNP, Jesús Sim, PA-C, Becki Melendez, PA-C, Liz Barrow, CNP,        Kiersten Feliz, CNS, Lani Costa, CNP, Diana Cm, CNP         [facility]   IN-PATIENT SERVICE   [facility]    Progress Note    7/10/2025    8:09 AM    Name:   Urvashi Tse  MRN:     [id number]     Acct:      [id number]   Room:   0345/0345-02   Day:  9  Admit Date:  6/30/2025 10:43 PM    PCP:   Camden Fields ND  Code Status:  Full Code    Subjective:     Vitals reviewed, afebrile but intermittently hypertensive otherwise hemodynamically stable. Saturating well on room room air.  Labs reviewed, mild hyponatremia 135 but stable, LFTs continue to improve, no leukocytosis, hemoglobin 8.3 and stable, platelets are mildly elevated 490.  Occult blood testing positive.  Blood culture positive x 2 for MSSA on 7/1/2025.  Repeat blood culture from 7/5/2025 negative x 5 days.  Anaerobic and aerobic culture from left psoas muscle positive for MSSA.  Overnight patient had no 
Writer called report to Rachel LLANOS on 3C. All questions answered.     
07/03/25 08:41 EDT     CBC:  Recent Labs     07/01/25  1529 07/02/25  0745 07/02/25  1858 07/03/25  0700 07/04/25  0621   WBC 19.9* 18.3* 16.3* 22.9* 17.7*   HGB 11.2* 9.5* 10.4* 10.0* 8.8*   MCV 92.7 93.8 92.7 94.4 100.4   RDW 13.2 13.4 13.5 13.6 14.1    328 348 383 322       ANEMIA STUDIES:  Recent Labs     07/02/25  0745   TIBC 134*       BMP:  Recent Labs     07/02/25  0745 07/03/25  0700 07/04/25  0621    135* 133*   K 3.6* 3.0* 3.2*    101 102   CO2 21 21 20   BUN 25* 21 34*   CREATININE 0.8 0.8 1.1*   GLUCOSE 111* 122* 111*   CALCIUM 8.7 8.7 8.7   MG  --   --  2.6*       LFTS:  Recent Labs     07/01/25  1529 07/02/25  0745 07/03/25  0700 07/04/25  0621   ALKPHOS 388* 493* 472* 433*   * 1,128* 587* 291*   * 1,624* 721* 493*   BILITOT 1.5* 2.9* 1.4* 1.0       Other pertinent labs:      Gastroenterology impression :      Transaminitis most likely due to past stone  -MRI likely passed choledocholithiasis, improvement in LFT  Severe back pain secondary to discitis and osteomyelitis  Discitis  Osteomyelitis  Psoas muscle abscess    Plan:    MRI showed past stone.  No plans for ERCP  Noted plan for lap ryan Monday  Okay for diet from GI perspective  Continue antibiotics  Continue supportive care per primary  Daily labs including CBC CMP INR  GI will sign off. outpatient follow-up with GI      This plan was formulated in collaboration with Dr. Stephie MD    Thank you for allowing me to participate in the care of your patient.  Please feel free to contact me with any questions or concerns.     [facility]   Gene Pena, LUDWIG - CNP   [phone]  7/4/2025  12:41 PM    Estimated time of  mins reviewing chart, assessing patient and formulating plan of care    This note was created with the assistance of a speech-recognition program.  Although the intention is to generate a document that actually reflects the content of the visit, no 
Interventions include: Facilitated expression of thoughts and feelings, Explored spiritual coping/struggle/distress, Affirmed coping skills/support systems, and Provided sacramental/Synagogue ritual  Family/Friends Interventions include: Facilitated expression of thoughts and feelings, Explored spiritual coping/struggle/distress, and Affirmed coping skills/support systems    Patient Plan of Care: Spiritual Care available upon further referral  Family/Friends Plan of Care: Spiritual Care available upon further referral    Electronically signed by Chaplain Misael on 7/4/2025 at 12:01 PM    
output:  In: 1500 [P.O.:800; I.V.:700]  Out: 710 [Urine:710]    LAB:  CBC:   Recent Labs     07/05/25  0448 07/05/25  1800 07/06/25  1054 07/07/25  0500   WBC 12.4*  --  12.5* 11.0   HGB 8.0* 8.3* 8.5* 7.5*   HCT 24.5* 25.0* 26.0* 22.5*   MCV 95.0  --  94.9 93.4     --  417 339     BMP:   Recent Labs     07/05/25  0448 07/06/25  1054 07/07/25  0500    137 133*   K 3.4* 3.3* 3.4*    104 100   CO2 21 22 25   BUN 27* 16 15   CREATININE 0.9 0.7 0.6   GLUCOSE 128* 130* 119*     COAGS:   Recent Labs     07/05/25  0448 07/07/25  0500   INR 1.2 1.1       RADIOLOGY:  No results found.            Sohail Correa,   7/4/25      
by complaints of dizziness. BP assessed and WNL.  More Ambulation?: Yes  Ambulation 2  Surface - 2: level tile  Device 2: Rolling Walker  Assistance 2: Minimal assistance  Quality of Gait 2: same as above  Gait Deviations: Slow Vanessa;Shuffles;Decreased step height;Decreased step length  Distance: 20'  Comments: Pt ambulated 20' with RW Min A for balance and safety, limited by dizziness. Pt and RN educated on walking to/from bathroom versus using the commode.    Balance   Balance  Posture: Fair  Sitting - Static: Good  Sitting - Dynamic: Good;-  Standing - Static: Fair  Standing - Dynamic: Fair;-  Comments: Assessed sitting balance EOB and standing balance with Rw    Exercise     Patient Education  Patient Education  Education Given To: Patient  Education Provided: Role of Therapy;Transfer Training;Mobility Training  Education Provided Comments: transfer technique, importance of mobility, toilet use versus commode  Education Method: Verbal;Demonstration  Barriers to Learning: Cognition  Education Outcome: Verbalized understanding;Continued education needed    Plan  Physical Therapy Plan  General Plan:  (5-6x week)  Current Treatment Recommendations: Strengthening, Balance training, Functional mobility training, Transfer training, Gait training, Endurance training, Stair training, Safety education & training, Therapeutic activities    Goals  Short Term Goals  Time Frame for Short Term Goals: 10 visits  Short Term Goal 1: transfers with SBA  Short Term Goal 2: amb 125 ft with a RW x SBA  Short Term Goal 3: ascend/descend 4 steps with SBA  Short Term Goal 4: 20 min strengthening exercises x SBA    Minutes  PT Individual Minutes  Time In: 0830  Time Out: 0905  Minutes: 35  Time Code Minutes  Timed Code Treatment Minutes: 30 Minutes    Electronically signed by Thomas Mercado PTA on 7/8/25 at 9:57 AM EDT     
extension, cues to not  walker during ambulation, inconsistent CARMEN.  Gait Deviations: Slow Vanessa;Decreased step height;Decreased step length;Shuffles  Distance: 180'  Comments: Pt ambulated 180' with Rw Min A and chair following, no LOB, generally unsteady due to inconsistent CARMEN, decreased safety awareness, talking excessively.  More Ambulation?: No    Balance   Balance  Posture: Fair  Sitting - Static: Good  Sitting - Dynamic: Good;-  Standing - Static: Fair  Standing - Dynamic: Fair;-  Comments: Assessed sitting balance in chair and standing balance with RW    Exercise     Patient Education  Patient Education  Education Given To: Patient  Education Provided: Role of Therapy;Transfer Training;Mobility Training  Education Provided Comments: transfer technique, importance of mobility, toilet use versus commode  Education Method: Verbal;Demonstration  Barriers to Learning: Cognition  Education Outcome: Verbalized understanding;Continued education needed    Plan  Physical Therapy Plan  General Plan:  (5-6x/week)  Current Treatment Recommendations: Strengthening, Balance training, Functional mobility training, Transfer training, Gait training, Endurance training, Stair training, Safety education & training, Therapeutic activities    Goals  Short Term Goals  Time Frame for Short Term Goals: 10 visits  Short Term Goal 1: transfers with SBA  Short Term Goal 2: amb 125 ft with a RW x SBA  Short Term Goal 3: ascend/descend 4 steps with SBA  Short Term Goal 4: 20 min strengthening exercises x SBA    Minutes  PT Individual Minutes  Time In: 1545  Time Out: 1558  Minutes: 13  Time Code Minutes  Timed Code Treatment Minutes: 12 Minutes    Electronically signed by Thomas Mercado PTA on 7/9/25 at 4:14 PM EDT     
right kidney corresponds to the recent sonographic finding and previously demonstrated AML with CT. GI/BOWEL: The visualized bowel is normal in caliber.  Diverticulosis. PERITONEUM/RETROPERITONEUM:  No abdominal lymphadenopathy.  No free intraperitoneal fluid. VESSELS:  The aorta is normal in caliber. SOFT TISSUES/BONES: Partially visualized distended bladder.  Known left psoas abscesses partially evaluated on this exam.  The left psoas muscle remains edematous and enlarged with fluid signal present. *Unless otherwise specified, incidental findings do not require dedicated imaging follow-up.     1. Mild enlargement of the intrahepatic and extrahepatic biliary tree, new compared to recent ultrasound exam. No choledocholithiasis or obstructing mass identified. This may represent recently passed stone. 2. Distended gallbladder with a small stone and layering sludge. No wall thickening appreciated. 3. Partially visualize known left psoas abscess, incompletely evaluated on this exam.  Follow-up with contrast-enhanced CT is recommended when clinically appropriate. 4. Trace pleural effusions and dependent subsegmental atelectasis. 5. 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     CT CHEST W CONTRAST  Result Date: 7/2/2025  EXAM: CT CHEST WITH CONTRAST 07/02/2025 10:56:26 AM TECHNIQUE: CT of the chest was performed with the administration of intravenous contrast. Multiplanar reformatted images are provided for review. Automated exposure control, iterative reconstruction, and/or weight based adjustment of the mA/kV was utilized to reduce the radiation dose to as low as reasonably achievable. COMPARISON: None available. CLINICAL HISTORY: Evaluate incidentally found lesion in left lung. FINDINGS: MEDIASTINUM: Minimal coronary calcifications. The central airways are clear. LYMPH NODES: No mediastinal, hilar or axillary lymphadenopathy. LUNGS AND PLEURA: Left lower lobe pulmonary nodule 
(Principal) Epidural abscess 7/1/2025 Yes    Bacteremia 7/4/2025 Yes    Vertebral osteomyelitis (HCC) 7/1/2025 Yes    Fungemia 7/1/2025 Yes    Mass of lower lobe of left lung 7/2/2025 Yes    Renal lesion 7/2/2025 Yes    Liver injury 7/2/2025 Yes    Choledocholithiasis 7/3/2025 Yes    Acute hepatitis 7/3/2025 Yes    Transaminitis 7/3/2025 Yes    Septic embolism (HCC) 7/4/2025 Yes       Plan:     Gram-positive cocci and Candida bacteremia/fungemia, back pain, lower extremity weakness likely related to epidural abscess.  MRI lumbar spine shows edema within the L2 and L3 vertebral bodies likely representing a degree of osteomyelitis with associated epidural thickening and enhancement most pronounced anteriorly from L1-L2 disc level to L3-L4 disc level with an associated fluid collection consistent with an epidural abscess.  There is also edema and enhancement throughout the left psoas muscle.  Blood culture from June 30, 2025 positive for MSSA, repeat cultures positive for gram-positive cocci and Candida tropicalis.  TTE negative for any vegetations  Plan for 6 weeks of IV antibiotics and antifungal.    Peripherally enhancing cystic focus in the medial aspect of the left lower lobe  Previously discussed with interventional radiologist.  Differential includes septic emboli versus malignancy.  Plan to treat with IV antibiotics and repeat imaging in a few weeks.  Hematology/Oncology following believe septic emboli.    Elevated LFT's  MRCP with mildly enlarged intrahepatic and extrahepatic biliary tree new compared to recent ultrasound exam with no choledocholithiasis or obstructing mass identified with distended gallbladder with a stone and layering sludge.  GI and General Surgery following. Plan for EUS/ERCP and Cholecystectomy Monday however MRCP showed no stone.  ERCP canceled with plan for cholecystectomy on 7/7/2025.    Acute Kidney Injury.   Resolved.   Continue to hold Losartan.   Bladder scan and Straight cath if 
GI is following appreciate input and planning for EUS to assess for any common bile duct stone  4.  Concerning for GI bleed melena, continue to monitor H&H and will transfuse hemoglobin less than 7, continue PPI, GI is following planning for EGD on Monday  5.  General Surgery is following and planning for lap Timur on 7-7  6.  Diabetes continue insulin sliding scale diabetic diet and glucose  7,.  Correct electrolyte abnormality  8.  Depression/anxiety continue home medication  9..Echogenic lesion in the midpole of the right kidney found incidentally measuring 11 x 11 x 15 mm  Needs nonemergent renal mass protocol MRI or CT for further evaluation.  10.Peripherally enhancing cystic focus in the medial aspect of the left lower lobe  Previously discussed with interventional radiologist.  Differential includes septic emboli versus malignancy.  Plan to treat with IV antibiotics and repeat imaging in a few weeks.  Hematology/Oncology following believe septic emboli.    Bernardo Maldonado MD  7/6/2025  10:10 AM     
  Check Occult blood testing - positive.   EGD with GI > few antral erosions.   Stable.Will monitor.   Protonix.     Echogenic lesion in the midpole of the right kidney found incidentally measuring 11 x 11 x 15 mm  Needs nonemergent renal mass protocol MRI or CT for further evaluation.    COPD without exacerbation.  Supportive Care.     Dyslipidemia on statin will hold given increase in LFTs.  Improving.   Hold Lipitor.     Primary hypertension.  Continue Toprol XL. Resume Norvasc.   Hold Losartan can resume if needed.    DVT Prophylaxis: Lovenox.  GI Prophylaxis: None.     DC Planning: Pending placement.  Will need finalization of antibiotics.  Medically stable for discharge.    Mando Paul DO  7/9/2025  8:04 AM     
canceled with plan for EGD and EUS followed by cholecystectomy on 7/7/2025.  Improved.     Acute Kidney Injury.   Resolved.      Normocytic Anemia.   Check Occult blood testing - positive.   EGD with GI > few antral erosions.   Stable.Will monitor.   Protonix.     Echogenic lesion in the midpole of the right kidney found incidentally measuring 11 x 11 x 15 mm  Needs nonemergent renal mass protocol MRI or CT for further evaluation.    COPD without exacerbation.  Supportive Care.     Dyslipidemia on statin will hold given increase in LFTs.  Improving.   Hold Lipitor.     Primary hypertension.  Continue Toprol XL. Resume Norvasc.   Hold Losartan can resume if needed.    DVT Prophylaxis: Lovenox.  GI Prophylaxis: None.     DC Planning: Pending tolerance of diet and finalization of antibiotics.     Mando Paul DO  7/8/2025  7:48 AM     
mass protocol MRI or CT for further evaluation.    COPD without exacerbation.  Supportive Care.     Dyslipidemia on statin will hold given increase in LFTs.  Improving.   GI following > EUS/ERCP Monday.   Hold Lipitor.     Primary hypertension.  Continue Toprol XL.   Hold Losartan given renal function.     DVT Prophylaxis: Lovenox held do to hemoglobin drop.   GI Prophylaxis: None.     DC Planning: Pending EUS/ERCP and Cholecystectomy Monday.     Mando Paul DO  7/4/2025  7:21 AM     
pertinent history and exam findings,  with the author of this note . I have reviewed the key elements of all parts of the encounter with the nurse practitioner/resident.  I agree with the assessment, plan and orders as documented by the above health care provider with the following addendum.     More than 50% of the time was spent taking care of this patient in addition to the nurse practitioner time.  That also included history taking follow-up physical examination and review of system.    Electronically signed by Eduard Card MD     
<=0.25  Sensitive      erythromycin <=0.25  Sensitive      gentamicin <=0.5  Sensitive  [1]       levofloxacin <=0.12  Sensitive      oxacillin 0.5  Sensitive      penicillin >=0.5  Resistant      tetracycline >=16  Resistant      trimethoprim-sulfamethoxazole <=10  Sensitive                   [1]  Gentamicin is used only in combination with other active agents that test susceptible.                    Culture, Blood 1 [[id number]]     Order Status: Canceled Specimen: Blood     Culture, Blood 2 [[id number]]     Order Status: Canceled Specimen: Blood     Blood Culture 1 [[id number]]  (Abnormal) Collected: 06/30/25 1050    Order Status: Completed Specimen: Blood Updated: 07/02/25 1800     Specimen Description .BLOOD     Special Requests 1ml, lwrist     Culture POSITIVE Blood Culture      DIRECT GRAM STAIN FROM BOTTLE: GRAM POSITIVE COCCI IN CLUSTERS      STAPHYLOCOCCUS AUREUS This isolate is methicillin susceptible. For susceptibility, refer to previous culture.      (NOTE) Direct Gram Stain from bottle result called to and read back by:ROMI KEVIN 7/1/25 1725    Culture, Blood 2 [[id number]]  (Abnormal)  (Susceptibility) Collected: 06/30/25 1000    Order Status: Completed Specimen: Blood Updated: 07/03/25 2037     Specimen Description .BLOOD     Special Requests 10ml lac     Culture POSITIVE Blood Culture      DIRECT GRAM STAIN FROM BOTTLE: GRAM POSITIVE COCCI IN CLUSTERS      Staphylococcus aureus Detected: mecA/C and MREJ Not Detected Methodology- Polymerase Chain Reaction (PCR)      Candida tropicalis Detected: Methodology- Polymerase Chain Reaction (PCR) Culture Results to Follow      STAPHYLOCOCCUS AUREUS This isolate is methicillin susceptible.      NO CANDIDA TROPICALIS ISOLATED      (NOTE) Direct Gram Stain from bottle result called to and read back by:06/30/2025 @2330 TO JERONIMO DELTA RN ST VS BY [facility]    Susceptibility        Staphylococcus aureus      BACTERIAL SUSCEPTIBILITY PANEL DEBORAH      clindamycin 
Specimen: Blood    Culture, Blood 2 [[id number]]    Order Status: No result Specimen: Blood    Blood Culture 1 [[id number]] (Abnormal) Collected: 06/30/25 1050   Order Status: Completed Specimen: Blood Updated: 07/02/25 1800    Specimen Description .BLOOD    Special Requests 1ml, lwrist    Culture POSITIVE Blood Culture Abnormal      DIRECT GRAM STAIN FROM BOTTLE: GRAM POSITIVE COCCI IN CLUSTERS     STAPHYLOCOCCUS AUREUS This isolate is methicillin susceptible. For susceptibility, refer to previous culture. Abnormal      (NOTE) Direct Gram Stain from bottle result called to and read back by:ROMI KEVIN 7/1/25 1725   Culture, Blood 2 [[id number]] (Abnormal)  Collected: 06/30/25 1000   Order Status: Completed Specimen: Blood Updated: 07/02/25 1747    Specimen Description .BLOOD    Special Requests 10ml lac    Culture POSITIVE Blood Culture Abnormal      DIRECT GRAM STAIN FROM BOTTLE: GRAM POSITIVE COCCI IN CLUSTERS     Staphylococcus aureus Detected: mecA/C and MREJ Not Detected Methodology- Polymerase Chain Reaction (PCR)     Candida tropicalis Detected: Methodology- Polymerase Chain Reaction (PCR) Culture Results to Follow     STAPHYLOCOCCUS AUREUS This isolate is methicillin susceptible. Abnormal      (NOTE) Direct Gram Stain from bottle result called to and read back by:06/30/2025 @2330 TO JERONIMO DELTA RN ST VS BY [facility]   Susceptibility     Staphylococcus aureus     BACTERIAL SUSCEPTIBILITY PANEL DEBORAH (Preliminary)     clindamycin <=0.25 Sensitive     erythromycin <=0.25 Sensitive     gentamicin <=0.5 Sensitive 1     levofloxacin <=0.12 Sensitive     oxacillin 0.5 Sensitive     penicillin >=0.5 Resistant     tetracycline >=16 Resistant     trimethoprim-sulfamethoxazole <=10 Sensitive              1 Gentamicin is used only in combination with other active agents that test susceptible.             Medications:      acetaminophen  1,000 mg Oral 3 times per day    ceFAZolin  2,000 mg IntraVENous Q8H    amphotericin B 
[[id number]]    Order Status: Canceled Specimen: Blood    Culture, Blood 2 [[id number]]    Order Status: Canceled Specimen: Blood    Blood Culture 1 [[id number]] (Abnormal) Collected: 06/30/25 1050   Order Status: Completed Specimen: Blood Updated: 07/02/25 1800    Specimen Description .BLOOD    Special Requests 1ml, lwrist    Culture POSITIVE Blood Culture Abnormal      DIRECT GRAM STAIN FROM BOTTLE: GRAM POSITIVE COCCI IN CLUSTERS     STAPHYLOCOCCUS AUREUS This isolate is methicillin susceptible. For susceptibility, refer to previous culture. Abnormal      (NOTE) Direct Gram Stain from bottle result called to and read back by:ROMI KEVIN 7/1/25 1725   Culture, Blood 2 [[id number]] (Abnormal)  Collected: 06/30/25 1000   Order Status: Completed Specimen: Blood Updated: 07/03/25 2037    Specimen Description .BLOOD    Special Requests 10ml lac    Culture POSITIVE Blood Culture Abnormal      DIRECT GRAM STAIN FROM BOTTLE: GRAM POSITIVE COCCI IN CLUSTERS     Staphylococcus aureus Detected: mecA/C and MREJ Not Detected Methodology- Polymerase Chain Reaction (PCR)     Candida tropicalis Detected: Methodology- Polymerase Chain Reaction (PCR) Culture Results to Follow     STAPHYLOCOCCUS AUREUS This isolate is methicillin susceptible. Abnormal      NO CANDIDA TROPICALIS ISOLATED     (NOTE) Direct Gram Stain from bottle result called to and read back by:06/30/2025 @2330 TO JERONIMO DELTA RN ST VS BY [facility]   Susceptibility     Staphylococcus aureus     BACTERIAL SUSCEPTIBILITY PANEL DEBORAH     clindamycin <=0.25 Sensitive     erythromycin <=0.25 Sensitive     gentamicin <=0.5 Sensitive 1     levofloxacin <=0.12 Sensitive     oxacillin 0.5 Sensitive     penicillin >=0.5 Resistant     tetracycline >=16 Resistant     trimethoprim-sulfamethoxazole <=10 Sensitive              1 Gentamicin is used only in combination with other active agents that test susceptibl             Medications:      QUEtiapine  25 mg Oral Nightly    pantoprazole 
not require dedicated imaging follow-up.     1. Mild enlargement of the intrahepatic and extrahepatic biliary tree, new compared to recent ultrasound exam. No choledocholithiasis or obstructing mass identified. This may represent recently passed stone. 2. Distended gallbladder with a small stone and layering sludge. No wall thickening appreciated. 3. Partially visualize known left psoas abscess, incompletely evaluated on this exam.  Follow-up with contrast-enhanced CT is recommended when clinically appropriate. 4. Trace pleural effusions and dependent subsegmental atelectasis. 5. 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     CT ABSCESS DRAINAGE  Result Date: 7/2/2025  PROCEDURE: CT GUIDED LEFT PSOAS COLLECTION ASPIRATION 7/2/2025 HISTORY: ORDERING SYSTEM PROVIDED HISTORY:  Left psoas muscle with multiple fluid collections/abscess.  ? if can be safely drained. TECHNOLOGIST PROVIDED HISTORY: Left psoas muscle with multiple fluid collections/abscess.  ? if can be safely drained. SEDATION: None TECHNIQUE AND FINDINGS: Informed consent was obtained from the patient's family member after a detailed explanation of the procedure including risks, benefits, and alternatives.  Universal protocol was followed.  Sterile gowns, masks, hats, and gloves utilized for maximal sterile barrier.  A suitable skin site was prepped and draped in sterile fashion following CT localization.  With CT for localization a 5 Hebrew centesis catheter/needle was advanced into the heterogeneous hypodense area within the left psoas muscle corresponding to the prior CT findings.  Review of the prior CT shows the complex collection, probably not large enough for drainage catheter placement.  CT images show a safe tract and access into the area.  Approximately 5 mL of bloody fluid was obtained.  Sample was sent for the requested studies including cultures. Postprocedure images show no local complication.  Sterile 
and sagittal reformations were performed. Dose reduction techniques were achieved by using automated exposure control and/or adjustment of mA and/or kV according to patient size and/or use of iterative reconstruction technique. FINDINGS:  POSITIVES related to history: Minimal symmetric atrophy not unusual for age.  NEGATIVES related to history: No bleed, mass or midline shift.  COINCIDENTAL, unrelated to history: Minimal motion artifact.  ROUTINE: Bone windows and MPR's negative.     Negative.     XR SHOULDER LEFT (MIN 2 VIEWS)  Result Date: 6/30/2025  EXAMINATION: 3 XRAY VIEWS OF THE LEFT SHOULDER 6/30/2025 1:25 pm COMPARISON: None. HISTORY: ORDERING SYSTEM PROVIDED HISTORY: injury TECHNOLOGIST PROVIDED HISTORY: injury FINDINGS: AC joint degenerative change.  No acute fracture or dislocation visualized lungs show no infiltrates.     No acute osseous abnormality.     XR CHEST PORTABLE  Result Date: 6/30/2025  EXAMINATION: ONE XRAY VIEW OF THE CHEST 6/30/2025 11:53 am COMPARISON: Chest radiograph 05/28/2021 HISTORY: ORDERING SYSTEM PROVIDED HISTORY: ams TECHNOLOGIST PROVIDED HISTORY: ams FINDINGS: No focal consolidation.  No pulmonary edema.  No pleural effusion or pneumothorax.  Normal cardiomediastinal silhouette.  No acute osseous abnormality.     No acute cardiopulmonary process.     XR LUMBAR SPINE (2-3 VIEWS)  Result Date: 6/25/2025  History:  Left lower back pain Exam/Technique:  AP and lateral views of the lumbar spine with spot lateral view for the lumbosacral junction. Comparison:  11/5/2024 Findings:  Approximately 2 mm of retrolisthesis of L2 on L3 and millimeters of anterolisthesis of L4 on L5 are unchanged.  AP alignment of lumbar vertebral bodies appears normal at other levels. Degenerative changes redemonstrated that are most prominent at L5-S1 with prominent spurring anteriorly and posteriorly, endplate sclerosis, vacuum phenomenon, and some degree of irregular disc space narrowing. Mild disc space 
cardiopulmonary process.     XR LUMBAR SPINE (2-3 VIEWS)  Result Date: 6/25/2025  History:  Left lower back pain Exam/Technique:  AP and lateral views of the lumbar spine with spot lateral view for the lumbosacral junction. Comparison:  11/5/2024 Findings:  Approximately 2 mm of retrolisthesis of L2 on L3 and millimeters of anterolisthesis of L4 on L5 are unchanged.  AP alignment of lumbar vertebral bodies appears normal at other levels. Degenerative changes redemonstrated that are most prominent at L5-S1 with prominent spurring anteriorly and posteriorly, endplate sclerosis, vacuum phenomenon, and some degree of irregular disc space narrowing. Mild disc space narrowing also suggested L3-L4. Sclerotic degenerative changes in at least the L4-L5 and L5-S1 facet joints bilaterally IMPRESSION:  No acute bony abnormalities displayed with no significant change from last November. Moderately prominent degenerative changes as detailed above Workstation:[id number] Finalized by Chester Torrez MD on 6/25/2025 3:59 PM    Ultrasound thyroid w/wo biopsy  Result Date: 6/4/2025  Isthmus measures 0.32 cm Right lobe measures 3.75 x 1.79 x 2.33 cm.  In the right mid thyroid is mostly cystic nodule measuring 0.72 x 0.28 x 0.58 cm, TR 2. Left thyroid measures 4.34 x 2.16 x 1.88 cm.  There is a mid lobe cystic nodule measuring 0.61 x 0.38 x 0.54 cm, TR 1.  In the inferior thyroid is cyst measuring 1.0 x 0.8 x 0.79 cm, TR 1.  Adjacent to that is a mixed isoechoic nodule measuring 1.07 x 0.81 x 1.06 cm, TR 3          IMPRESSION:   Primary Problem  Epidural abscess    Active Hospital Problems    Diagnosis Date Noted    Septic embolism (HCC) [I76] 07/04/2025    Choledocholithiasis [K80.50] 07/03/2025    Acute hepatitis [B17.9] 07/03/2025    Transaminitis [R74.01] 07/03/2025    Mass of lower lobe of left lung [R91.8] 07/02/2025    Renal lesion [N28.9] 07/02/2025    Liver injury [S36.119A] 07/02/2025    Epidural abscess [G06.2] 07/01/2025    
these lung nodules represent septic emboli however neoplastic process cannot be ruled out.  Recommend completing course of antibiotics and reassess with repeat imaging.  If findings are still concerning for neoplastic process, we will proceed with biopsy.  Antibiotics per infectious disease, she remains on cefazolin and micafungin  Monitor for signs of bleeding and transfuse to keep hemoglobin greater than 7  She will need to follow-up with oncology in our [place] office upon discharge.  Appointment scheduled for 7/30 at 10:15 AM  Final recommendations to follow after discussing with attending physician, Dr. Trevino      Discussed with patient and Nurse.      Thank you for asking us to see this patient.    Mitali Page, APRN - CNP  [facility] Hematology/Oncology  7/10/2025     Attending Physician Statement  I have discussed the care of Urvashi Tse and I have examined the patient myselft and taken ros and hpi , including pertinent history and exam findings,  with the author of this note . I have reviewed the key elements of all parts of the encounter with the nurse practitioner/resident.  I agree with the assessment, plan and orders as documented by the above health care provider.  I have made necessary changes as deemed appropriate directly in the note.     More than 50% of the time was spent taking care of this patient in addition to the nurse practitioner time.  That also included history taking follow-up physical examination and review of system.    Electronically signed by Estevan Hernández MD             This note is created with the assistance of a speech recognition program.  While intending to generate a document that actually reflects the content of the visit, the document can still have some errors including those of syntax and sound a like substitutions which may escape proof reading.  It such instances, actual meaning can be extrapolated by contextual diversion.

## 2025-07-10 NOTE — PLAN OF CARE
Problem: Safety - Adult  Goal: Free from fall injury  7/2/2025 1142 by Rom Rodriguez RN  Outcome: Progressing  7/2/2025 0513 by Leah Mcclure RN  Outcome: Progressing     Problem: Chronic Conditions and Co-morbidities  Goal: Patient's chronic conditions and co-morbidity symptoms are monitored and maintained or improved  7/2/2025 1142 by Rom Rodriguez RN  Outcome: Progressing  7/2/2025 0513 by Leah Mcclure RN  Outcome: Progressing     Problem: Skin/Tissue Integrity  Goal: Skin integrity remains intact  Description: 1.  Monitor for areas of redness and/or skin breakdown  2.  Assess vascular access sites hourly  3.  Every 4-6 hours minimum:  Change oxygen saturation probe site  4.  Every 4-6 hours:  If on nasal continuous positive airway pressure, respiratory therapy assess nares and determine need for appliance change or resting period  7/2/2025 1142 by Rom Rodriguez RN  Outcome: Progressing  7/2/2025 0513 by Leah Mcclure RN  Outcome: Progressing     Problem: Pain  Goal: Verbalizes/displays adequate comfort level or baseline comfort level  7/2/2025 1142 by Rom Rodriguez RN  Outcome: Progressing  7/2/2025 0513 by Leah Mcclure RN  Outcome: Progressing     
  Problem: Safety - Adult  Goal: Free from fall injury  7/3/2025 1610 by Trina Moy RN  Outcome: Progressing  7/3/2025 0240 by Vipin Lawler RN  Outcome: Progressing     Problem: Chronic Conditions and Co-morbidities  Goal: Patient's chronic conditions and co-morbidity symptoms are monitored and maintained or improved  7/3/2025 1610 by Trina Moy RN  Outcome: Progressing  7/3/2025 0240 by Vipin Lawler RN  Outcome: Progressing     Problem: Skin/Tissue Integrity  Goal: Skin integrity remains intact  Description: 1.  Monitor for areas of redness and/or skin breakdown  2.  Assess vascular access sites hourly  3.  Every 4-6 hours minimum:  Change oxygen saturation probe site  4.  Every 4-6 hours:  If on nasal continuous positive airway pressure, respiratory therapy assess nares and determine need for appliance change or resting period  7/3/2025 1610 by Trina Moy RN  Outcome: Progressing  7/3/2025 0240 by Vipin Lawler RN  Outcome: Progressing     Problem: Pain  Goal: Verbalizes/displays adequate comfort level or baseline comfort level  7/3/2025 1610 by Trina Moy RN  Outcome: Progressing  7/3/2025 0240 by Vipin Lawler RN  Outcome: Progressing     
  Problem: Safety - Adult  Goal: Free from fall injury  7/4/2025 0238 by Vipin Lawler RN  Outcome: Progressing  7/3/2025 1610 by Trina Moy RN  Outcome: Progressing     Problem: Chronic Conditions and Co-morbidities  Goal: Patient's chronic conditions and co-morbidity symptoms are monitored and maintained or improved  7/4/2025 0238 by Vipin Lawler RN  Outcome: Progressing  7/3/2025 1610 by Trina Moy RN  Outcome: Progressing     Problem: Skin/Tissue Integrity  Goal: Skin integrity remains intact  Description: 1.  Monitor for areas of redness and/or skin breakdown  2.  Assess vascular access sites hourly  3.  Every 4-6 hours minimum:  Change oxygen saturation probe site  4.  Every 4-6 hours:  If on nasal continuous positive airway pressure, respiratory therapy assess nares and determine need for appliance change or resting period  7/4/2025 0238 by Vipin Lawler RN  Outcome: Progressing  7/3/2025 1610 by Trina Moy RN  Outcome: Progressing     Problem: Pain  Goal: Verbalizes/displays adequate comfort level or baseline comfort level  7/4/2025 0238 by Vipin Lawler RN  Outcome: Progressing  7/3/2025 1610 by Trina Moy RN  Outcome: Progressing     
  Problem: Safety - Adult  Goal: Free from fall injury  7/5/2025 0625 by Karen Wu RN  Outcome: Progressing  7/4/2025 1709 by Beatrice Alexis RN  Outcome: Progressing     Problem: Chronic Conditions and Co-morbidities  Goal: Patient's chronic conditions and co-morbidity symptoms are monitored and maintained or improved  7/5/2025 0625 by Karen Wu RN  Outcome: Progressing  7/4/2025 1709 by Beatrice Alexis RN  Outcome: Progressing     Problem: Skin/Tissue Integrity  Goal: Skin integrity remains intact  Description: 1.  Monitor for areas of redness and/or skin breakdown  2.  Assess vascular access sites hourly  3.  Every 4-6 hours minimum:  Change oxygen saturation probe site  4.  Every 4-6 hours:  If on nasal continuous positive airway pressure, respiratory therapy assess nares and determine need for appliance change or resting period  7/5/2025 0625 by Karen Wu RN  Outcome: Progressing  7/4/2025 1709 by Beatrice Alexis RN  Outcome: Progressing     Problem: Pain  Goal: Verbalizes/displays adequate comfort level or baseline comfort level  7/5/2025 0625 by Karen Wu RN  Outcome: Progressing  7/4/2025 1709 by Beatrice Alexis RN  Outcome: Progressing     
  Problem: Safety - Adult  Goal: Free from fall injury  7/7/2025 1628 by Rachel Billy RN  Outcome: Progressing  7/7/2025 0503 by Leah Mcclure RN  Outcome: Progressing  Flowsheets (Taken 7/6/2025 2024)  Free From Fall Injury: Instruct family/caregiver on patient safety     Problem: Chronic Conditions and Co-morbidities  Goal: Patient's chronic conditions and co-morbidity symptoms are monitored and maintained or improved  7/7/2025 1628 by Rachel Billy RN  Outcome: Progressing  7/7/2025 0503 by Leah Mcclure RN  Outcome: Progressing     Problem: Skin/Tissue Integrity  Goal: Skin integrity remains intact  Description: 1.  Monitor for areas of redness and/or skin breakdown  2.  Assess vascular access sites hourly  3.  Every 4-6 hours minimum:  Change oxygen saturation probe site  4.  Every 4-6 hours:  If on nasal continuous positive airway pressure, respiratory therapy assess nares and determine need for appliance change or resting period  7/7/2025 1628 by Rachel Billy RN  Outcome: Progressing  7/7/2025 0503 by Leah Mcclure RN  Outcome: Progressing  Flowsheets (Taken 7/6/2025 2024)  Skin Integrity Remains Intact: Monitor for areas of redness and/or skin breakdown     Problem: Pain  Goal: Verbalizes/displays adequate comfort level or baseline comfort level  7/7/2025 1628 by Rachel Billy RN  Outcome: Progressing  7/7/2025 0503 by Leah Mcclure RN  Outcome: Progressing     Problem: Discharge Planning  Goal: Discharge to home or other facility with appropriate resources  Outcome: Progressing     
  Problem: Safety - Adult  Goal: Free from fall injury  7/8/2025 0528 by Alena Banegas RN  Outcome: Progressing  7/7/2025 1628 by Rachel Billy RN  Outcome: Progressing     Problem: Chronic Conditions and Co-morbidities  Goal: Patient's chronic conditions and co-morbidity symptoms are monitored and maintained or improved  7/8/2025 0528 by Alena Banegas RN  Outcome: Progressing  7/7/2025 1628 by Rachel Billy RN  Outcome: Progressing     Problem: Skin/Tissue Integrity  Goal: Skin integrity remains intact  Description: 1.  Monitor for areas of redness and/or skin breakdown  2.  Assess vascular access sites hourly  3.  Every 4-6 hours minimum:  Change oxygen saturation probe site  4.  Every 4-6 hours:  If on nasal continuous positive airway pressure, respiratory therapy assess nares and determine need for appliance change or resting period  7/8/2025 0528 by Alena Banegas RN  Outcome: Progressing  7/7/2025 1628 by Rachel Billy RN  Outcome: Progressing     Problem: Pain  Goal: Verbalizes/displays adequate comfort level or baseline comfort level  7/8/2025 0528 by Alena Banegas RN  Outcome: Progressing  7/7/2025 1628 by Rachel Billy RN  Outcome: Progressing     Problem: Discharge Planning  Goal: Discharge to home or other facility with appropriate resources  7/8/2025 0528 by Alena Banegas RN  Outcome: Progressing  7/7/2025 1628 by Rachel Billy RN  Outcome: Progressing     
  Problem: Safety - Adult  Goal: Free from fall injury  7/8/2025 1807 by June Sanon RN  Outcome: Progressing  7/8/2025 0528 by Alena Banegas RN  Outcome: Progressing     Problem: Chronic Conditions and Co-morbidities  Goal: Patient's chronic conditions and co-morbidity symptoms are monitored and maintained or improved  7/8/2025 1807 by June Sanon RN  Outcome: Progressing  7/8/2025 0528 by Alena Banegas RN  Outcome: Progressing     Problem: Skin/Tissue Integrity  Goal: Skin integrity remains intact  Description: 1.  Monitor for areas of redness and/or skin breakdown  2.  Assess vascular access sites hourly  3.  Every 4-6 hours minimum:  Change oxygen saturation probe site  4.  Every 4-6 hours:  If on nasal continuous positive airway pressure, respiratory therapy assess nares and determine need for appliance change or resting period  7/8/2025 1807 by June Sanon RN  Outcome: Progressing  7/8/2025 0528 by Alena Banegas RN  Outcome: Progressing     Problem: Pain  Goal: Verbalizes/displays adequate comfort level or baseline comfort level  7/8/2025 1807 by June Sanon RN  Outcome: Progressing  7/8/2025 0528 by Alean Banegas RN  Outcome: Progressing     Problem: Discharge Planning  Goal: Discharge to home or other facility with appropriate resources  7/8/2025 1807 by June Sanon RN  Outcome: Progressing  7/8/2025 0528 by Alena Banegas RN  Outcome: Progressing     
  Problem: Safety - Adult  Goal: Free from fall injury  7/9/2025 0502 by Leah Mcclure RN  Outcome: Progressing  Flowsheets (Taken 7/8/2025 2044)  Free From Fall Injury:   Based on caregiver fall risk screen, instruct family/caregiver to ask for assistance with transferring infant if caregiver noted to have fall risk factors   Instruct family/caregiver on patient safety  7/8/2025 1807 by June Sanon RN  Outcome: Progressing     Problem: Skin/Tissue Integrity  Goal: Skin integrity remains intact  Description: 1.  Monitor for areas of redness and/or skin breakdown  2.  Assess vascular access sites hourly  3.  Every 4-6 hours minimum:  Change oxygen saturation probe site  4.  Every 4-6 hours:  If on nasal continuous positive airway pressure, respiratory therapy assess nares and determine need for appliance change or resting period  7/9/2025 0502 by Leah Mcclure RN  Outcome: Progressing  Flowsheets (Taken 7/8/2025 2044)  Skin Integrity Remains Intact: Monitor for areas of redness and/or skin breakdown  7/8/2025 1807 by June Sanon RN  Outcome: Progressing     Problem: Pain  Goal: Verbalizes/displays adequate comfort level or baseline comfort level  7/9/2025 0502 by Leah Mcclure RN  Outcome: Progressing  7/8/2025 1807 by June Sanon RN  Outcome: Progressing     Problem: Discharge Planning  Goal: Discharge to home or other facility with appropriate resources  7/9/2025 0502 by Leah Mcclure RN  Outcome: Progressing  7/8/2025 1807 by June Sanon RN  Outcome: Progressing     Problem: ABCDS Injury Assessment  Goal: Absence of physical injury  Outcome: Progressing     
  Problem: Safety - Adult  Goal: Free from fall injury  7/9/2025 1702 by Kaley Meehan, RN  Outcome: Progressing  7/9/2025 0502 by Leah Mcclure, ROMI  Outcome: Progressing  Flowsheets (Taken 7/8/2025 2044)  Free From Fall Injury:   Based on caregiver fall risk screen, instruct family/caregiver to ask for assistance with transferring infant if caregiver noted to have fall risk factors   Instruct family/caregiver on patient safety     
  Problem: Safety - Adult  Goal: Free from fall injury  7/9/2025 2300 by Courtney Berman RN  Outcome: Progressing  7/9/2025 1754 by June Sanon RN  Outcome: Progressing  7/9/2025 1702 by Kaley Meehan RN  Outcome: Progressing     Problem: Chronic Conditions and Co-morbidities  Goal: Patient's chronic conditions and co-morbidity symptoms are monitored and maintained or improved  7/9/2025 2300 by Courtney Berman RN  Outcome: Progressing  7/9/2025 1754 by June Sanon RN  Outcome: Progressing  7/9/2025 1702 by Kaley Meehan RN  Outcome: Progressing     Problem: Skin/Tissue Integrity  Goal: Skin integrity remains intact  Description: 1.  Monitor for areas of redness and/or skin breakdown  2.  Assess vascular access sites hourly  3.  Every 4-6 hours minimum:  Change oxygen saturation probe site  4.  Every 4-6 hours:  If on nasal continuous positive airway pressure, respiratory therapy assess nares and determine need for appliance change or resting period  7/9/2025 2300 by Courtney Berman RN  Outcome: Progressing  7/9/2025 1754 by June Sanon RN  Outcome: Progressing  7/9/2025 1702 by Kaley Meehan RN  Outcome: Progressing     Problem: Pain  Goal: Verbalizes/displays adequate comfort level or baseline comfort level  7/9/2025 2300 by Courtney Berman RN  Outcome: Progressing  7/9/2025 1754 by June Sanon RN  Outcome: Progressing  7/9/2025 1702 by Kaley Meehan RN  Outcome: Progressing     Problem: Discharge Planning  Goal: Discharge to home or other facility with appropriate resources  7/9/2025 2300 by Courtney Berman RN  Outcome: Progressing  7/9/2025 1754 by June Sanon RN  Outcome: Progressing  7/9/2025 1702 by Kaley Meehan RN  Outcome: Progressing     Problem: ABCDS Injury Assessment  Goal: Absence of physical injury  7/9/2025 2300 by Courtney Berman RN  Outcome: Progressing  7/9/2025 1754 by June Sanon RN  Outcome: Progressing  7/9/2025 1702 by Kaley Meehan, RN  Outcome: 
  Problem: Safety - Adult  Goal: Free from fall injury  Outcome: Completed     Problem: Chronic Conditions and Co-morbidities  Goal: Patient's chronic conditions and co-morbidity symptoms are monitored and maintained or improved  Outcome: Completed     Problem: Skin/Tissue Integrity  Goal: Skin integrity remains intact  Description: 1.  Monitor for areas of redness and/or skin breakdown  2.  Assess vascular access sites hourly  3.  Every 4-6 hours minimum:  Change oxygen saturation probe site  4.  Every 4-6 hours:  If on nasal continuous positive airway pressure, respiratory therapy assess nares and determine need for appliance change or resting period  Outcome: Completed     Problem: Pain  Goal: Verbalizes/displays adequate comfort level or baseline comfort level  Outcome: Completed     Problem: Discharge Planning  Goal: Discharge to home or other facility with appropriate resources  Outcome: Completed     Problem: ABCDS Injury Assessment  Goal: Absence of physical injury  Outcome: Completed     
  Problem: Safety - Adult  Goal: Free from fall injury  Outcome: Progressing     Problem: Chronic Conditions and Co-morbidities  Goal: Patient's chronic conditions and co-morbidity symptoms are monitored and maintained or improved  Outcome: Progressing     Problem: Skin/Tissue Integrity  Goal: Skin integrity remains intact  Description: 1.  Monitor for areas of redness and/or skin breakdown  2.  Assess vascular access sites hourly  3.  Every 4-6 hours minimum:  Change oxygen saturation probe site  4.  Every 4-6 hours:  If on nasal continuous positive airway pressure, respiratory therapy assess nares and determine need for appliance change or resting period  Outcome: Progressing     Problem: Pain  Goal: Verbalizes/displays adequate comfort level or baseline comfort level  Outcome: Progressing     
  Problem: Safety - Adult  Goal: Free from fall injury  Outcome: Progressing  Flowsheets (Taken 7/5/2025 2032)  Free From Fall Injury: Instruct family/caregiver on patient safety     Problem: Chronic Conditions and Co-morbidities  Goal: Patient's chronic conditions and co-morbidity symptoms are monitored and maintained or improved  Outcome: Progressing     Problem: Skin/Tissue Integrity  Goal: Skin integrity remains intact  Description: 1.  Monitor for areas of redness and/or skin breakdown  2.  Assess vascular access sites hourly  3.  Every 4-6 hours minimum:  Change oxygen saturation probe site  4.  Every 4-6 hours:  If on nasal continuous positive airway pressure, respiratory therapy assess nares and determine need for appliance change or resting period  Outcome: Progressing  Flowsheets (Taken 7/5/2025 2032)  Skin Integrity Remains Intact: Monitor for areas of redness and/or skin breakdown     Problem: Pain  Goal: Verbalizes/displays adequate comfort level or baseline comfort level  Outcome: Progressing     
  Problem: Safety - Adult  Goal: Free from fall injury  Outcome: Progressing  Flowsheets (Taken 7/6/2025 2024)  Free From Fall Injury: Instruct family/caregiver on patient safety     Problem: Chronic Conditions and Co-morbidities  Goal: Patient's chronic conditions and co-morbidity symptoms are monitored and maintained or improved  Outcome: Progressing     Problem: Skin/Tissue Integrity  Goal: Skin integrity remains intact  Description: 1.  Monitor for areas of redness and/or skin breakdown  2.  Assess vascular access sites hourly  3.  Every 4-6 hours minimum:  Change oxygen saturation probe site  4.  Every 4-6 hours:  If on nasal continuous positive airway pressure, respiratory therapy assess nares and determine need for appliance change or resting period  Outcome: Progressing  Flowsheets (Taken 7/6/2025 2024)  Skin Integrity Remains Intact: Monitor for areas of redness and/or skin breakdown     Problem: Pain  Goal: Verbalizes/displays adequate comfort level or baseline comfort level  Outcome: Progressing     
No acute neurosurgical interventions at this time  Medicine can determine whether or not patient needs biopsy with IR  Follow up in 6 weeks  ATB per ID    NEUROSURGERY TO SIGN OFF     Please contact Neurosurgery with any questions.    PATIENT TO FOLLOW UP IN CLINIC:  ---  Follow-up with Neurosurgery  [facility]  [address] [place] [address]/[facility] (Medical Office Building [address])  [address]  Call [phone] for an appointment.  --  Electronically signed by LUDWIG Gaxiola CNP on 7/1/2025 at 3:00 PM    
Kaley Meehan, RN  Outcome: Progressing     Problem: ABCDS Injury Assessment  Goal: Absence of physical injury  7/9/2025 2300 by Courtney Berman RN  Outcome: Progressing  7/9/2025 2300 by Courtney Berman RN  Outcome: Progressing  7/9/2025 1754 by June Sanon RN  Outcome: Progressing  7/9/2025 1702 by Kaley Meehan RN  Outcome: Progressing

## 2025-07-14 ENCOUNTER — TELEPHONE (OUTPATIENT)
Dept: ONCOLOGY | Age: 80
End: 2025-07-14

## 2025-07-14 ENCOUNTER — HOSPITAL ENCOUNTER (OUTPATIENT)
Age: 80
Setting detail: SPECIMEN
Discharge: HOME | End: 2025-07-14
Payer: MEDICARE

## 2025-07-14 LAB
ALBUMIN SERPL-MCNC: 3 G/DL (ref 3.5–5.2)
ALBUMIN/GLOB SERPL: 0.7 {RATIO} (ref 1–2.5)
ALP SERPL-CCNC: 284 U/L (ref 35–104)
ALT SERPL-CCNC: 33 U/L (ref 10–35)
ANION GAP SERPL CALCULATED.3IONS-SCNC: 12 MMOL/L (ref 9–16)
AST SERPL-CCNC: 57 U/L (ref 10–35)
BILIRUB SERPL-MCNC: 0.6 MG/DL (ref 0–1.2)
BUN SERPL-MCNC: 11 MG/DL (ref 8–23)
BUN/CREAT SERPL: 18 (ref 9–20)
CALCIUM SERPL-MCNC: 9.2 MG/DL (ref 8.6–10.4)
CHLORIDE SERPL-SCNC: 101 MMOL/L (ref 98–107)
CO2 SERPL-SCNC: 26 MMOL/L (ref 20–31)
CREAT SERPL-MCNC: 0.6 MG/DL (ref 0.5–0.9)
ERYTHROCYTE [DISTWIDTH] IN BLOOD BY AUTOMATED COUNT: 13.7 % (ref 11.8–14.4)
GFR, ESTIMATED: >90 ML/MIN/1.73M2
GLUCOSE SERPL-MCNC: 103 MG/DL (ref 74–99)
HCT VFR BLD AUTO: 25.3 % (ref 36.3–47.1)
HGB BLD-MCNC: 8.1 G/DL (ref 11.9–15.1)
MCH RBC QN AUTO: 30.9 PG (ref 25.2–33.5)
MCHC RBC AUTO-ENTMCNC: 32 G/DL (ref 28.4–34.8)
MCV RBC AUTO: 96.6 FL (ref 82.6–102.9)
NRBC BLD-RTO: 0 PER 100 WBC
PLATELET # BLD AUTO: 574 K/UL (ref 138–453)
PMV BLD AUTO: 8.6 FL (ref 8.1–13.5)
POTASSIUM SERPL-SCNC: 3.3 MMOL/L (ref 3.7–5.3)
PROT SERPL-MCNC: 7.5 G/DL (ref 6.6–8.7)
RBC # BLD AUTO: 2.62 M/UL (ref 3.95–5.11)
SODIUM SERPL-SCNC: 139 MMOL/L (ref 136–145)
WBC OTHER # BLD: 7.7 K/UL (ref 3.5–11.3)

## 2025-07-14 PROCEDURE — 80053 COMPREHEN METABOLIC PANEL: CPT

## 2025-07-14 PROCEDURE — 36415 COLL VENOUS BLD VENIPUNCTURE: CPT

## 2025-07-14 PROCEDURE — 85027 COMPLETE CBC AUTOMATED: CPT

## 2025-07-14 NOTE — TELEPHONE ENCOUNTER
Name: Urvashi Tse  : 1945  MRN: [id number]    Oncology Navigation Follow-Up Note    Notes: Patient was seen in house by oncology. Per Dr. Varghese's note pt to complete antibiotics and have repeat imaging. Currently on cefazolin and micafungin. Discharged to [facility].     Follow up on 25 at 10:15.     Electronically signed by Nidia No RN on 2025 at 10:09 AM

## 2025-07-21 ENCOUNTER — HOSPITAL ENCOUNTER (OUTPATIENT)
Age: 80
Setting detail: SPECIMEN
Discharge: HOME | End: 2025-07-21
Payer: MEDICARE

## 2025-07-21 LAB
ALBUMIN SERPL-MCNC: 2.8 G/DL (ref 3.5–5.2)
ALBUMIN/GLOB SERPL: 0.7 {RATIO} (ref 1–2.5)
ALP SERPL-CCNC: 196 U/L (ref 35–104)
ALT SERPL-CCNC: 38 U/L (ref 10–35)
ANION GAP SERPL CALCULATED.3IONS-SCNC: 12 MMOL/L (ref 9–16)
AST SERPL-CCNC: 99 U/L (ref 10–35)
BILIRUB SERPL-MCNC: 0.4 MG/DL (ref 0–1.2)
BUN SERPL-MCNC: 11 MG/DL (ref 8–23)
BUN/CREAT SERPL: 18 (ref 9–20)
CALCIUM SERPL-MCNC: 9 MG/DL (ref 8.6–10.4)
CHLORIDE SERPL-SCNC: 104 MMOL/L (ref 98–107)
CO2 SERPL-SCNC: 26 MMOL/L (ref 20–31)
CREAT SERPL-MCNC: 0.6 MG/DL (ref 0.5–0.9)
ERYTHROCYTE [DISTWIDTH] IN BLOOD BY AUTOMATED COUNT: 13.4 % (ref 11.8–14.4)
GFR, ESTIMATED: >90 ML/MIN/1.73M2
GLUCOSE SERPL-MCNC: 96 MG/DL (ref 74–99)
HCT VFR BLD AUTO: 23.8 % (ref 36.3–47.1)
HGB BLD-MCNC: 7.5 G/DL (ref 11.9–15.1)
MCH RBC QN AUTO: 30.4 PG (ref 25.2–33.5)
MCHC RBC AUTO-ENTMCNC: 31.5 G/DL (ref 28.4–34.8)
MCV RBC AUTO: 96.4 FL (ref 82.6–102.9)
NRBC BLD-RTO: 0 PER 100 WBC
PLATELET # BLD AUTO: 391 K/UL (ref 138–453)
PMV BLD AUTO: 8.8 FL (ref 8.1–13.5)
POTASSIUM SERPL-SCNC: 4 MMOL/L (ref 3.7–5.3)
PROT SERPL-MCNC: 6.9 G/DL (ref 6.6–8.7)
RBC # BLD AUTO: 2.47 M/UL (ref 3.95–5.11)
SODIUM SERPL-SCNC: 142 MMOL/L (ref 136–145)
WBC OTHER # BLD: 6 K/UL (ref 3.5–11.3)

## 2025-07-21 PROCEDURE — 85027 COMPLETE CBC AUTOMATED: CPT

## 2025-07-21 PROCEDURE — 36415 COLL VENOUS BLD VENIPUNCTURE: CPT

## 2025-07-21 PROCEDURE — P9603 ONE-WAY ALLOW PRORATED MILES: HCPCS

## 2025-07-21 PROCEDURE — 80053 COMPREHEN METABOLIC PANEL: CPT

## 2025-07-28 ENCOUNTER — TELEPHONE (OUTPATIENT)
Dept: GASTROENTEROLOGY | Age: 80
End: 2025-07-28

## 2025-07-28 NOTE — TELEPHONE ENCOUNTER
Gloria from [facility] calling to make a follow up appointment with , please call Gloria at [phone] [facility]/sc

## 2025-07-29 ENCOUNTER — HOSPITAL ENCOUNTER (OUTPATIENT)
Age: 80
Setting detail: SPECIMEN
Discharge: HOME | End: 2025-07-29
Payer: MEDICARE

## 2025-07-29 LAB
ALBUMIN SERPL-MCNC: 3.5 G/DL (ref 3.5–5.2)
ALBUMIN/GLOB SERPL: 0.7 {RATIO} (ref 1–2.5)
ALP SERPL-CCNC: 184 U/L (ref 35–104)
ALT SERPL-CCNC: 105 U/L (ref 10–35)
ANION GAP SERPL CALCULATED.3IONS-SCNC: 15 MMOL/L (ref 9–16)
AST SERPL-CCNC: 88 U/L (ref 10–35)
BILIRUB SERPL-MCNC: 0.3 MG/DL (ref 0–1.2)
BUN SERPL-MCNC: 16 MG/DL (ref 8–23)
BUN/CREAT SERPL: 20 (ref 9–20)
CALCIUM SERPL-MCNC: 9.3 MG/DL (ref 8.6–10.4)
CHLORIDE SERPL-SCNC: 100 MMOL/L (ref 98–107)
CO2 SERPL-SCNC: 23 MMOL/L (ref 20–31)
CREAT SERPL-MCNC: 0.8 MG/DL (ref 0.5–0.9)
ERYTHROCYTE [DISTWIDTH] IN BLOOD BY AUTOMATED COUNT: 13.2 % (ref 11.8–14.4)
GFR, ESTIMATED: 76 ML/MIN/1.73M2
GLUCOSE SERPL-MCNC: 97 MG/DL (ref 74–99)
HCT VFR BLD AUTO: 28.7 % (ref 36.3–47.1)
HGB BLD-MCNC: 9.1 G/DL (ref 11.9–15.1)
MCH RBC QN AUTO: 29.8 PG (ref 25.2–33.5)
MCHC RBC AUTO-ENTMCNC: 31.7 G/DL (ref 28.4–34.8)
MCV RBC AUTO: 94.1 FL (ref 82.6–102.9)
NRBC BLD-RTO: 0 PER 100 WBC
PLATELET # BLD AUTO: 604 K/UL (ref 138–453)
PMV BLD AUTO: 9 FL (ref 8.1–13.5)
POTASSIUM SERPL-SCNC: 3.9 MMOL/L (ref 3.7–5.3)
PROT SERPL-MCNC: 8.5 G/DL (ref 6.6–8.7)
RBC # BLD AUTO: 3.05 M/UL (ref 3.95–5.11)
SODIUM SERPL-SCNC: 138 MMOL/L (ref 136–145)
WBC OTHER # BLD: 9.5 K/UL (ref 3.5–11.3)

## 2025-07-29 PROCEDURE — 85027 COMPLETE CBC AUTOMATED: CPT

## 2025-07-29 PROCEDURE — 80053 COMPREHEN METABOLIC PANEL: CPT

## 2025-07-30 ENCOUNTER — OFFICE VISIT (OUTPATIENT)
Dept: ONCOLOGY | Age: 80
End: 2025-07-30
Payer: MEDICARE

## 2025-07-30 VITALS
TEMPERATURE: 98.5 F | WEIGHT: 119 LBS | HEART RATE: 77 BPM | BODY MASS INDEX: 21.09 KG/M2 | RESPIRATION RATE: 18 BRPM | SYSTOLIC BLOOD PRESSURE: 137 MMHG | DIASTOLIC BLOOD PRESSURE: 73 MMHG

## 2025-07-30 DIAGNOSIS — R91.8 MASS OF LOWER LOBE OF LEFT LUNG: Primary | ICD-10-CM

## 2025-07-30 PROCEDURE — G8428 CUR MEDS NOT DOCUMENT: HCPCS | Performed by: INTERNAL MEDICINE

## 2025-07-30 PROCEDURE — 1126F AMNT PAIN NOTED NONE PRSNT: CPT | Performed by: INTERNAL MEDICINE

## 2025-07-30 PROCEDURE — 3075F SYST BP GE 130 - 139MM HG: CPT | Performed by: INTERNAL MEDICINE

## 2025-07-30 PROCEDURE — G8420 CALC BMI NORM PARAMETERS: HCPCS | Performed by: INTERNAL MEDICINE

## 2025-07-30 PROCEDURE — 99214 OFFICE O/P EST MOD 30 MIN: CPT | Performed by: INTERNAL MEDICINE

## 2025-07-30 PROCEDURE — 1036F TOBACCO NON-USER: CPT | Performed by: INTERNAL MEDICINE

## 2025-07-30 PROCEDURE — G8399 PT W/DXA RESULTS DOCUMENT: HCPCS | Performed by: INTERNAL MEDICINE

## 2025-07-30 PROCEDURE — 1123F ACP DISCUSS/DSCN MKR DOCD: CPT | Performed by: INTERNAL MEDICINE

## 2025-07-30 PROCEDURE — 1090F PRES/ABSN URINE INCON ASSESS: CPT | Performed by: INTERNAL MEDICINE

## 2025-07-30 PROCEDURE — 99211 OFF/OP EST MAY X REQ PHY/QHP: CPT | Performed by: INTERNAL MEDICINE

## 2025-07-30 PROCEDURE — 1111F DSCHRG MED/CURRENT MED MERGE: CPT | Performed by: INTERNAL MEDICINE

## 2025-07-30 PROCEDURE — 3078F DIAST BP <80 MM HG: CPT | Performed by: INTERNAL MEDICINE

## 2025-07-30 RX ORDER — MIRTAZAPINE 15 MG/1
7.5 TABLET, FILM COATED ORAL
COMMUNITY
Start: 2025-05-27

## 2025-07-30 RX ORDER — AMLODIPINE BESYLATE 5 MG/1
5 TABLET ORAL DAILY
COMMUNITY
Start: 2025-05-18

## 2025-07-30 NOTE — TELEPHONE ENCOUNTER
Gloria from [facility] stated she was returning a missed call to schedule an appointment. She can be reached at [phone] . Okay to leave  a message.

## 2025-07-31 ENCOUNTER — OFFICE VISIT (OUTPATIENT)
Age: 80
End: 2025-07-31

## 2025-07-31 VITALS
BODY MASS INDEX: 21.09 KG/M2 | HEART RATE: 71 BPM | SYSTOLIC BLOOD PRESSURE: 141 MMHG | HEIGHT: 63 IN | WEIGHT: 119 LBS | DIASTOLIC BLOOD PRESSURE: 67 MMHG

## 2025-07-31 DIAGNOSIS — Z90.49 S/P LAPAROSCOPIC CHOLECYSTECTOMY: Primary | ICD-10-CM

## 2025-07-31 PROCEDURE — 99024 POSTOP FOLLOW-UP VISIT: CPT | Performed by: NURSE PRACTITIONER

## 2025-07-31 RX ORDER — AMLODIPINE BESYLATE 5 MG/1
5 TABLET ORAL DAILY
Qty: 30 TABLET | COMMUNITY
Start: 2025-07-31 | End: 2025-07-31

## 2025-07-31 NOTE — PROGRESS NOTES
General Surgery   [address] [facility], [facility] [address]  [place] [address]  [phone]  [address] [place] [address]  [phone]  [email or web address]    Clinic Note - Post-op Patient      Patient: Urvashi Tse  MRN: [id number]  YOB: 1945 (80 y.o.)    Date of Office Visit: July 31, 2025     CC: Post op follow up    SUBJECTIVE:  Urvashi Tse is a 80 y.o. female who is seen at the [facility] surgery clinic for post op follow up from lap ryan and ERCP 7/7. She is still at a SNF for rehab. 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 Eating a regular diet without difficulty. Bowel movements are Normal.  The patient is not having any pain. She denies fevers or drainage from the incisions.       Physical Exam:    Vitals:    07/31/25 1021   BP: (!) 141/67   Pulse: 71     Physical Exam  Vitals reviewed.   Cardiovascular:      Rate and Rhythm: Normal rate.   Pulmonary:      Effort: Pulmonary effort is normal.   Abdominal:      General: There is no distension.   Neurological:      General: No focal deficit present.       Pathology:     SURGICAL PATHOLOGY REPORT  Order: [id number]   Status: Final result       Next appt: Today at 10:15 AM in General Surgery (LUDWIG Bolivar - CNP)    Test Result Released: Yes (not seen)    0 Result Notes      Component  Ref Range & Units (hover) 7/7/25 0000   Surgical Pathology Report Path Number: [id number]    -- Diagnosis --  A.  STOMACH, BIOPSIES:  - MILD TO MODERATE CHRONIC GASTRITIS.  - HELICOBACTER PYLORI IMMUNOSTAIN (CONTROL APPROPRIATE) IS NEGATIVE   FOR INFECTIOUS BACTERIA.    B.  GALLBLADDER, CHOLECYSTECTOMY:  - MILD CHRONIC CHOLECYSTITIS.  - CHOLELITHIASIS (SCANT CALCULUS GRAVEL MATERIAL).            Assessment/Plan:  S/p lap ryan  Pathology reviewed and discussed, patient states understanding  Tolerating a diet, having BM's  Advised to contact the clinic with any concerns.         We

## 2025-08-05 ENCOUNTER — HOSPITAL ENCOUNTER (OUTPATIENT)
Age: 80
Setting detail: SPECIMEN
Discharge: HOME | End: 2025-08-05
Payer: MEDICARE

## 2025-08-05 LAB
ALBUMIN SERPL-MCNC: 3.1 G/DL (ref 3.5–5.2)
ALBUMIN/GLOB SERPL: 0.7 {RATIO} (ref 1–2.5)
ALP SERPL-CCNC: 141 U/L (ref 35–104)
ALT SERPL-CCNC: 50 U/L (ref 10–35)
ANION GAP SERPL CALCULATED.3IONS-SCNC: 10 MMOL/L (ref 9–16)
AST SERPL-CCNC: 55 U/L (ref 10–35)
BILIRUB SERPL-MCNC: 0.2 MG/DL (ref 0–1.2)
BUN SERPL-MCNC: 16 MG/DL (ref 8–23)
BUN/CREAT SERPL: 20 (ref 9–20)
CALCIUM SERPL-MCNC: 9.7 MG/DL (ref 8.6–10.4)
CHLORIDE SERPL-SCNC: 101 MMOL/L (ref 98–107)
CO2 SERPL-SCNC: 26 MMOL/L (ref 20–31)
CREAT SERPL-MCNC: 0.8 MG/DL (ref 0.5–0.9)
ERYTHROCYTE [DISTWIDTH] IN BLOOD BY AUTOMATED COUNT: 13.5 % (ref 11.8–14.4)
GFR, ESTIMATED: 79 ML/MIN/1.73M2
GLUCOSE SERPL-MCNC: 107 MG/DL (ref 74–99)
HCT VFR BLD AUTO: 25.6 % (ref 36.3–47.1)
HGB BLD-MCNC: 8.1 G/DL (ref 11.9–15.1)
MCH RBC QN AUTO: 29.6 PG (ref 25.2–33.5)
MCHC RBC AUTO-ENTMCNC: 31.6 G/DL (ref 28.4–34.8)
MCV RBC AUTO: 93.4 FL (ref 82.6–102.9)
NRBC BLD-RTO: 0 PER 100 WBC
PLATELET # BLD AUTO: 433 K/UL (ref 138–453)
PMV BLD AUTO: 9.1 FL (ref 8.1–13.5)
POTASSIUM SERPL-SCNC: 4.6 MMOL/L (ref 3.7–5.3)
PROT SERPL-MCNC: 7.4 G/DL (ref 6.6–8.7)
RBC # BLD AUTO: 2.74 M/UL (ref 3.95–5.11)
SODIUM SERPL-SCNC: 137 MMOL/L (ref 136–145)
WBC OTHER # BLD: 6.5 K/UL (ref 3.5–11.3)

## 2025-08-05 PROCEDURE — 80053 COMPREHEN METABOLIC PANEL: CPT

## 2025-08-05 PROCEDURE — 85027 COMPLETE CBC AUTOMATED: CPT

## 2025-08-05 PROCEDURE — 36415 COLL VENOUS BLD VENIPUNCTURE: CPT

## 2025-08-11 ENCOUNTER — HOSPITAL ENCOUNTER (OUTPATIENT)
Age: 80
Setting detail: SPECIMEN
Discharge: HOME | End: 2025-08-11
Payer: MEDICARE

## 2025-08-11 LAB
ALBUMIN SERPL-MCNC: 3.3 G/DL (ref 3.5–5.2)
ALBUMIN/GLOB SERPL: 0.8 {RATIO} (ref 1–2.5)
ALP SERPL-CCNC: 136 U/L (ref 35–104)
ALT SERPL-CCNC: 19 U/L (ref 10–35)
ANION GAP SERPL CALCULATED.3IONS-SCNC: 10 MMOL/L (ref 9–16)
AST SERPL-CCNC: 53 U/L (ref 10–35)
BILIRUB SERPL-MCNC: 0.4 MG/DL (ref 0–1.2)
BUN SERPL-MCNC: 15 MG/DL (ref 8–23)
BUN/CREAT SERPL: 19 (ref 9–20)
CALCIUM SERPL-MCNC: 10 MG/DL (ref 8.6–10.4)
CHLORIDE SERPL-SCNC: 101 MMOL/L (ref 98–107)
CO2 SERPL-SCNC: 26 MMOL/L (ref 20–31)
CREAT SERPL-MCNC: 0.8 MG/DL (ref 0.5–0.9)
ERYTHROCYTE [DISTWIDTH] IN BLOOD BY AUTOMATED COUNT: 13.8 % (ref 11.8–14.4)
GFR, ESTIMATED: 74 ML/MIN/1.73M2
GLUCOSE SERPL-MCNC: 92 MG/DL (ref 74–99)
HCT VFR BLD AUTO: 26.7 % (ref 36.3–47.1)
HGB BLD-MCNC: 8.3 G/DL (ref 11.9–15.1)
MCH RBC QN AUTO: 28.6 PG (ref 25.2–33.5)
MCHC RBC AUTO-ENTMCNC: 31.1 G/DL (ref 28.4–34.8)
MCV RBC AUTO: 92.1 FL (ref 82.6–102.9)
NRBC BLD-RTO: 0 PER 100 WBC
PLATELET # BLD AUTO: 340 K/UL (ref 138–453)
PMV BLD AUTO: 9.4 FL (ref 8.1–13.5)
POTASSIUM SERPL-SCNC: 4.4 MMOL/L (ref 3.7–5.3)
PROT SERPL-MCNC: 7.5 G/DL (ref 6.6–8.7)
RBC # BLD AUTO: 2.9 M/UL (ref 3.95–5.11)
SODIUM SERPL-SCNC: 137 MMOL/L (ref 136–145)
WBC OTHER # BLD: 6 K/UL (ref 3.5–11.3)

## 2025-08-11 PROCEDURE — 36415 COLL VENOUS BLD VENIPUNCTURE: CPT

## 2025-08-11 PROCEDURE — P9603 ONE-WAY ALLOW PRORATED MILES: HCPCS

## 2025-08-11 PROCEDURE — 80053 COMPREHEN METABOLIC PANEL: CPT

## 2025-08-11 PROCEDURE — 85027 COMPLETE CBC AUTOMATED: CPT

## 2025-08-12 ENCOUNTER — OFFICE VISIT (OUTPATIENT)
Dept: NEUROSURGERY | Age: 80
End: 2025-08-12
Payer: MEDICARE

## 2025-08-12 VITALS
HEIGHT: 63 IN | BODY MASS INDEX: 21.09 KG/M2 | DIASTOLIC BLOOD PRESSURE: 56 MMHG | SYSTOLIC BLOOD PRESSURE: 128 MMHG | HEART RATE: 61 BPM | WEIGHT: 119 LBS

## 2025-08-12 DIAGNOSIS — G06.2 EPIDURAL ABSCESS: Primary | ICD-10-CM

## 2025-08-12 DIAGNOSIS — M46.20 VERTEBRAL OSTEOMYELITIS (HCC): ICD-10-CM

## 2025-08-12 PROCEDURE — 3074F SYST BP LT 130 MM HG: CPT | Performed by: NURSE PRACTITIONER

## 2025-08-12 PROCEDURE — G8399 PT W/DXA RESULTS DOCUMENT: HCPCS | Performed by: NURSE PRACTITIONER

## 2025-08-12 PROCEDURE — 1036F TOBACCO NON-USER: CPT | Performed by: NURSE PRACTITIONER

## 2025-08-12 PROCEDURE — 1090F PRES/ABSN URINE INCON ASSESS: CPT | Performed by: NURSE PRACTITIONER

## 2025-08-12 PROCEDURE — 99214 OFFICE O/P EST MOD 30 MIN: CPT | Performed by: NURSE PRACTITIONER

## 2025-08-12 PROCEDURE — 3078F DIAST BP <80 MM HG: CPT | Performed by: NURSE PRACTITIONER

## 2025-08-12 PROCEDURE — G8427 DOCREV CUR MEDS BY ELIG CLIN: HCPCS | Performed by: NURSE PRACTITIONER

## 2025-08-12 PROCEDURE — 1123F ACP DISCUSS/DSCN MKR DOCD: CPT | Performed by: NURSE PRACTITIONER

## 2025-08-12 PROCEDURE — G8420 CALC BMI NORM PARAMETERS: HCPCS | Performed by: NURSE PRACTITIONER

## 2025-08-12 RX ORDER — TRAZODONE HYDROCHLORIDE 50 MG/1
50 TABLET ORAL NIGHTLY
COMMUNITY

## 2025-08-12 RX ORDER — POLYETHYLENE GLYCOL 3350 17 G/17G
17 POWDER, FOR SOLUTION ORAL EVERY MORNING
COMMUNITY

## 2025-08-12 RX ORDER — DOCUSATE SODIUM 100 MG/1
100 CAPSULE, LIQUID FILLED ORAL DAILY
COMMUNITY

## 2025-08-18 ENCOUNTER — HOSPITAL ENCOUNTER (OUTPATIENT)
Age: 80
Setting detail: SPECIMEN
Discharge: HOME | End: 2025-08-18
Payer: MEDICARE

## 2025-08-18 ENCOUNTER — TELEPHONE (OUTPATIENT)
Dept: INFECTIOUS DISEASES | Age: 80
End: 2025-08-18

## 2025-08-18 LAB
ALBUMIN SERPL-MCNC: 3.4 G/DL (ref 3.5–5.2)
ALBUMIN/GLOB SERPL: 0.9 {RATIO} (ref 1–2.5)
ALP SERPL-CCNC: 126 U/L (ref 35–104)
ALT SERPL-CCNC: 34 U/L (ref 10–35)
ANION GAP SERPL CALCULATED.3IONS-SCNC: 12 MMOL/L (ref 9–16)
AST SERPL-CCNC: 48 U/L (ref 10–35)
BILIRUB SERPL-MCNC: 0.5 MG/DL (ref 0–1.2)
BUN SERPL-MCNC: 15 MG/DL (ref 8–23)
BUN/CREAT SERPL: 19 (ref 9–20)
CALCIUM SERPL-MCNC: 9.6 MG/DL (ref 8.6–10.4)
CHLORIDE SERPL-SCNC: 101 MMOL/L (ref 98–107)
CO2 SERPL-SCNC: 26 MMOL/L (ref 20–31)
CREAT SERPL-MCNC: 0.8 MG/DL (ref 0.5–0.9)
ERYTHROCYTE [DISTWIDTH] IN BLOOD BY AUTOMATED COUNT: 14.1 % (ref 11.8–14.4)
GFR, ESTIMATED: 70 ML/MIN/1.73M2
GLUCOSE SERPL-MCNC: 90 MG/DL (ref 74–99)
HCT VFR BLD AUTO: 26.6 % (ref 36.3–47.1)
HGB BLD-MCNC: 8.6 G/DL (ref 11.9–15.1)
MCH RBC QN AUTO: 29.6 PG (ref 25.2–33.5)
MCHC RBC AUTO-ENTMCNC: 32.3 G/DL (ref 28.4–34.8)
MCV RBC AUTO: 91.4 FL (ref 82.6–102.9)
NRBC BLD-RTO: 0 PER 100 WBC
PLATELET # BLD AUTO: 333 K/UL (ref 138–453)
PMV BLD AUTO: 9.3 FL (ref 8.1–13.5)
POTASSIUM SERPL-SCNC: 4.5 MMOL/L (ref 3.7–5.3)
PROT SERPL-MCNC: 7.3 G/DL (ref 6.6–8.7)
RBC # BLD AUTO: 2.91 M/UL (ref 3.95–5.11)
SODIUM SERPL-SCNC: 139 MMOL/L (ref 136–145)
WBC OTHER # BLD: 5.9 K/UL (ref 3.5–11.3)

## 2025-08-18 PROCEDURE — P9603 ONE-WAY ALLOW PRORATED MILES: HCPCS

## 2025-08-18 PROCEDURE — 85027 COMPLETE CBC AUTOMATED: CPT

## 2025-08-18 PROCEDURE — 36415 COLL VENOUS BLD VENIPUNCTURE: CPT

## 2025-08-18 PROCEDURE — 80053 COMPREHEN METABOLIC PANEL: CPT

## 2025-08-21 ENCOUNTER — OFFICE VISIT (OUTPATIENT)
Dept: INFECTIOUS DISEASES | Age: 80
End: 2025-08-21
Payer: MEDICARE

## 2025-08-21 VITALS
SYSTOLIC BLOOD PRESSURE: 130 MMHG | DIASTOLIC BLOOD PRESSURE: 57 MMHG | WEIGHT: 116.6 LBS | HEIGHT: 63 IN | HEART RATE: 56 BPM | BODY MASS INDEX: 20.66 KG/M2 | OXYGEN SATURATION: 98 %

## 2025-08-21 DIAGNOSIS — B49 FUNGEMIA: ICD-10-CM

## 2025-08-21 DIAGNOSIS — G06.2 EPIDURAL ABSCESS: ICD-10-CM

## 2025-08-21 DIAGNOSIS — M46.20 VERTEBRAL OSTEOMYELITIS (HCC): ICD-10-CM

## 2025-08-21 DIAGNOSIS — Z90.49 S/P LAPAROSCOPIC CHOLECYSTECTOMY: ICD-10-CM

## 2025-08-21 DIAGNOSIS — A41.01 MSSA (METHICILLIN SUSCEPTIBLE STAPHYLOCOCCUS AUREUS) SEPTICEMIA (HCC): Primary | ICD-10-CM

## 2025-08-21 PROCEDURE — 1123F ACP DISCUSS/DSCN MKR DOCD: CPT | Performed by: INTERNAL MEDICINE

## 2025-08-21 PROCEDURE — 1159F MED LIST DOCD IN RCRD: CPT | Performed by: INTERNAL MEDICINE

## 2025-08-21 PROCEDURE — G8427 DOCREV CUR MEDS BY ELIG CLIN: HCPCS | Performed by: INTERNAL MEDICINE

## 2025-08-21 PROCEDURE — 1090F PRES/ABSN URINE INCON ASSESS: CPT | Performed by: INTERNAL MEDICINE

## 2025-08-21 PROCEDURE — 3078F DIAST BP <80 MM HG: CPT | Performed by: INTERNAL MEDICINE

## 2025-08-21 PROCEDURE — 3075F SYST BP GE 130 - 139MM HG: CPT | Performed by: INTERNAL MEDICINE

## 2025-08-21 PROCEDURE — 99214 OFFICE O/P EST MOD 30 MIN: CPT | Performed by: INTERNAL MEDICINE

## 2025-08-21 PROCEDURE — G8399 PT W/DXA RESULTS DOCUMENT: HCPCS | Performed by: INTERNAL MEDICINE

## 2025-08-21 PROCEDURE — 1036F TOBACCO NON-USER: CPT | Performed by: INTERNAL MEDICINE

## 2025-08-21 PROCEDURE — G8420 CALC BMI NORM PARAMETERS: HCPCS | Performed by: INTERNAL MEDICINE

## 2025-08-25 ENCOUNTER — HOSPITAL ENCOUNTER (OUTPATIENT)
Dept: CT IMAGING | Age: 80
Discharge: HOME | End: 2025-08-27
Attending: INTERNAL MEDICINE
Payer: MEDICARE

## 2025-08-25 ENCOUNTER — HOSPITAL ENCOUNTER (OUTPATIENT)
Age: 80
Setting detail: SPECIMEN
Discharge: HOME | End: 2025-08-25
Payer: MEDICARE

## 2025-08-25 DIAGNOSIS — R91.8 MASS OF LOWER LOBE OF LEFT LUNG: ICD-10-CM

## 2025-08-25 PROCEDURE — 6360000004 HC RX CONTRAST MEDICATION: Performed by: INTERNAL MEDICINE

## 2025-08-25 PROCEDURE — 71260 CT THORAX DX C+: CPT

## 2025-08-25 PROCEDURE — P9604 ONE-WAY ALLOW PRORATED TRIP: HCPCS

## 2025-08-25 RX ORDER — IOPAMIDOL 755 MG/ML
75 INJECTION, SOLUTION INTRAVASCULAR
Status: COMPLETED | OUTPATIENT
Start: 2025-08-25 | End: 2025-08-25

## 2025-08-25 RX ADMIN — IOPAMIDOL 75 ML: 755 INJECTION, SOLUTION INTRAVENOUS at 11:08

## 2025-08-27 ENCOUNTER — OFFICE VISIT (OUTPATIENT)
Dept: ONCOLOGY | Age: 80
End: 2025-08-27
Payer: MEDICARE

## 2025-08-27 ENCOUNTER — TELEPHONE (OUTPATIENT)
Dept: ONCOLOGY | Age: 80
End: 2025-08-27

## 2025-08-27 VITALS
DIASTOLIC BLOOD PRESSURE: 72 MMHG | WEIGHT: 121 LBS | TEMPERATURE: 97.1 F | SYSTOLIC BLOOD PRESSURE: 135 MMHG | RESPIRATION RATE: 18 BRPM | HEART RATE: 72 BPM | BODY MASS INDEX: 21.43 KG/M2

## 2025-08-27 DIAGNOSIS — R91.8 MASS OF LOWER LOBE OF LEFT LUNG: Primary | ICD-10-CM

## 2025-08-27 PROCEDURE — G8399 PT W/DXA RESULTS DOCUMENT: HCPCS | Performed by: INTERNAL MEDICINE

## 2025-08-27 PROCEDURE — 3075F SYST BP GE 130 - 139MM HG: CPT | Performed by: INTERNAL MEDICINE

## 2025-08-27 PROCEDURE — 1126F AMNT PAIN NOTED NONE PRSNT: CPT | Performed by: INTERNAL MEDICINE

## 2025-08-27 PROCEDURE — 1123F ACP DISCUSS/DSCN MKR DOCD: CPT | Performed by: INTERNAL MEDICINE

## 2025-08-27 PROCEDURE — 1090F PRES/ABSN URINE INCON ASSESS: CPT | Performed by: INTERNAL MEDICINE

## 2025-08-27 PROCEDURE — G8420 CALC BMI NORM PARAMETERS: HCPCS | Performed by: INTERNAL MEDICINE

## 2025-08-27 PROCEDURE — 99211 OFF/OP EST MAY X REQ PHY/QHP: CPT | Performed by: INTERNAL MEDICINE

## 2025-08-27 PROCEDURE — 3078F DIAST BP <80 MM HG: CPT | Performed by: INTERNAL MEDICINE

## 2025-08-27 PROCEDURE — G8427 DOCREV CUR MEDS BY ELIG CLIN: HCPCS | Performed by: INTERNAL MEDICINE

## 2025-08-27 PROCEDURE — 1159F MED LIST DOCD IN RCRD: CPT | Performed by: INTERNAL MEDICINE

## 2025-08-27 PROCEDURE — 99214 OFFICE O/P EST MOD 30 MIN: CPT | Performed by: INTERNAL MEDICINE

## 2025-08-27 PROCEDURE — 1036F TOBACCO NON-USER: CPT | Performed by: INTERNAL MEDICINE

## 2025-09-03 ENCOUNTER — HOSPITAL ENCOUNTER (OUTPATIENT)
Dept: MRI IMAGING | Age: 80
Discharge: HOME | End: 2025-09-05
Payer: MEDICARE

## 2025-09-03 DIAGNOSIS — G06.2 EPIDURAL ABSCESS: ICD-10-CM

## 2025-09-03 DIAGNOSIS — M46.20 VERTEBRAL OSTEOMYELITIS (HCC): ICD-10-CM

## 2025-09-03 PROCEDURE — 72158 MRI LUMBAR SPINE W/O & W/DYE: CPT

## 2025-09-03 PROCEDURE — A9579 GAD-BASE MR CONTRAST NOS,1ML: HCPCS | Performed by: NURSE PRACTITIONER

## 2025-09-03 PROCEDURE — 6360000004 HC RX CONTRAST MEDICATION: Performed by: NURSE PRACTITIONER

## 2025-09-03 RX ORDER — GADOTERIDOL 279.3 MG/ML
11 INJECTION INTRAVENOUS
Status: COMPLETED | OUTPATIENT
Start: 2025-09-03 | End: 2025-09-03

## 2025-09-03 RX ADMIN — GADOTERIDOL 11 ML: 279.3 INJECTION, SOLUTION INTRAVENOUS at 15:09

## 2025-09-04 ENCOUNTER — OFFICE VISIT (OUTPATIENT)
Dept: GASTROENTEROLOGY | Age: 80
End: 2025-09-04
Payer: MEDICARE

## 2025-09-04 VITALS
BODY MASS INDEX: 21.37 KG/M2 | TEMPERATURE: 97.6 F | DIASTOLIC BLOOD PRESSURE: 69 MMHG | HEIGHT: 63 IN | RESPIRATION RATE: 16 BRPM | SYSTOLIC BLOOD PRESSURE: 135 MMHG | OXYGEN SATURATION: 97 % | WEIGHT: 120.6 LBS | HEART RATE: 58 BPM

## 2025-09-04 DIAGNOSIS — R79.89 ABNORMAL LFTS: Primary | ICD-10-CM

## 2025-09-04 PROCEDURE — G8420 CALC BMI NORM PARAMETERS: HCPCS | Performed by: INTERNAL MEDICINE

## 2025-09-04 PROCEDURE — 1159F MED LIST DOCD IN RCRD: CPT | Performed by: INTERNAL MEDICINE

## 2025-09-04 PROCEDURE — 1126F AMNT PAIN NOTED NONE PRSNT: CPT | Performed by: INTERNAL MEDICINE

## 2025-09-04 PROCEDURE — 3078F DIAST BP <80 MM HG: CPT | Performed by: INTERNAL MEDICINE

## 2025-09-04 PROCEDURE — G8399 PT W/DXA RESULTS DOCUMENT: HCPCS | Performed by: INTERNAL MEDICINE

## 2025-09-04 PROCEDURE — G8427 DOCREV CUR MEDS BY ELIG CLIN: HCPCS | Performed by: INTERNAL MEDICINE

## 2025-09-04 PROCEDURE — 1036F TOBACCO NON-USER: CPT | Performed by: INTERNAL MEDICINE

## 2025-09-04 PROCEDURE — 1090F PRES/ABSN URINE INCON ASSESS: CPT | Performed by: INTERNAL MEDICINE

## 2025-09-04 PROCEDURE — 1123F ACP DISCUSS/DSCN MKR DOCD: CPT | Performed by: INTERNAL MEDICINE

## 2025-09-04 PROCEDURE — 99214 OFFICE O/P EST MOD 30 MIN: CPT | Performed by: INTERNAL MEDICINE

## 2025-09-04 PROCEDURE — 3075F SYST BP GE 130 - 139MM HG: CPT | Performed by: INTERNAL MEDICINE

## (undated) DEVICE — SYRINGE MED 30ML STD CLR PLAS LUERLOCK TIP N CTRL DISP

## (undated) DEVICE — SUTURE VICRYL + SZ 0 L27IN ABSRB VLT L26MM UR-6 5/8 CIR VCP603H

## (undated) DEVICE — INSUFFLATION NEEDLE TO ESTABLISH PNEUMOPERITONEUM.: Brand: INSUFFLATION NEEDLE

## (undated) DEVICE — SYRINGE MED 10ML LUERLOCK TIP W/O SFTY DISP

## (undated) DEVICE — TROCAR: Brand: KII FIOS FIRST ENTRY

## (undated) DEVICE — GOWN,SIRUS,NONRNF,SETINSLV,XL,20/CS: Brand: MEDLINE

## (undated) DEVICE — STRAP,POSITIONING,KNEE/BODY,FOAM,4X60": Brand: MEDLINE

## (undated) DEVICE — SUTURE MONOCRYL + SZ 4 0 L18IN ABSRB UD PC 3 L16MM 3 8 CIR PRIM MCP845G

## (undated) DEVICE — APPLIER CLP M L L11.4IN DIA10MM ENDOSCP ROT MULT FOR LIG

## (undated) DEVICE — LIQUIBAND RAPID ADHESIVE 36/CS 0.8ML: Brand: MEDLINE

## (undated) DEVICE — TROCAR: Brand: KII® SLEEVE

## (undated) DEVICE — SINGLE USE MONOPOLAR STRAIGHT ENDOSCOPIC CORD 10 FT. (3M): Brand: KIRWAN

## (undated) DEVICE — GARMENT,MEDLINE,DVT,INT,CALF,MED, GEN2: Brand: MEDLINE

## (undated) DEVICE — Device

## (undated) DEVICE — TISSUE RETRIEVAL SYSTEM: Brand: INZII RETRIEVAL SYSTEM

## (undated) DEVICE — SUTURE MONOCRYL SZ 4-0 L18IN ABSRB UD L16MM PC-3 3/8 CIR PRIM Y845G

## (undated) DEVICE — GLOVE ORANGE PI 7 1/2   MSG9075

## (undated) DEVICE — FORCEPS BX 240CM 2.4MM L NDL RAD JAW 4 M00513334

## (undated) DEVICE — STRAP ARMBRD W1.5XL32IN FOAM STR YET SFT W/ HK AND LOOP

## (undated) DEVICE — TOWEL,OR,DSP,ST,NATURAL,DLX,4/PK,20PK/CS: Brand: MEDLINE

## (undated) DEVICE — INTRODUCER CATH L3.5IN DIA7.5FR FOR CHOLANGIOGRAPHY TAUT